# Patient Record
Sex: FEMALE | Race: WHITE | Employment: FULL TIME | URBAN - METROPOLITAN AREA
[De-identification: names, ages, dates, MRNs, and addresses within clinical notes are randomized per-mention and may not be internally consistent; named-entity substitution may affect disease eponyms.]

---

## 2018-06-15 ENCOUNTER — OFFICE VISIT (OUTPATIENT)
Dept: INTERNAL MEDICINE CLINIC | Age: 33
End: 2018-06-15
Payer: COMMERCIAL

## 2018-06-15 ENCOUNTER — TELEPHONE (OUTPATIENT)
Dept: FAMILY MEDICINE CLINIC | Age: 33
End: 2018-06-15

## 2018-06-15 VITALS
HEART RATE: 77 BPM | DIASTOLIC BLOOD PRESSURE: 70 MMHG | RESPIRATION RATE: 16 BRPM | HEIGHT: 65 IN | SYSTOLIC BLOOD PRESSURE: 114 MMHG | WEIGHT: 244 LBS | OXYGEN SATURATION: 98 % | BODY MASS INDEX: 40.65 KG/M2 | TEMPERATURE: 98 F

## 2018-06-15 DIAGNOSIS — F32.A ANXIETY AND DEPRESSION: Primary | ICD-10-CM

## 2018-06-15 DIAGNOSIS — Z02.89 EXAMINATION, PHYSICAL, EMPLOYEE: Primary | ICD-10-CM

## 2018-06-15 DIAGNOSIS — F41.9 ANXIETY AND DEPRESSION: Primary | ICD-10-CM

## 2018-06-15 PROCEDURE — 99385 PREV VISIT NEW AGE 18-39: CPT | Performed by: NURSE PRACTITIONER

## 2018-06-15 RX ORDER — TRAZODONE HYDROCHLORIDE 50 MG/1
50 TABLET ORAL NIGHTLY
COMMUNITY
End: 2018-10-31 | Stop reason: SDUPTHER

## 2018-06-15 RX ORDER — BUSPIRONE HYDROCHLORIDE 15 MG/1
15 TABLET ORAL 2 TIMES DAILY
COMMUNITY
End: 2018-08-22 | Stop reason: DRUGHIGH

## 2018-06-15 RX ORDER — VENLAFAXINE HYDROCHLORIDE 150 MG/1
150 CAPSULE, EXTENDED RELEASE ORAL DAILY
COMMUNITY
End: 2018-11-21 | Stop reason: SDUPTHER

## 2018-06-15 ASSESSMENT — ENCOUNTER SYMPTOMS
EYES NEGATIVE: 1
COUGH: 0
GASTROINTESTINAL NEGATIVE: 1
WHEEZING: 0
SHORTNESS OF BREATH: 0

## 2018-06-15 ASSESSMENT — PATIENT HEALTH QUESTIONNAIRE - PHQ9
1. LITTLE INTEREST OR PLEASURE IN DOING THINGS: 0
2. FEELING DOWN, DEPRESSED OR HOPELESS: 0
SUM OF ALL RESPONSES TO PHQ QUESTIONS 1-9: 0
SUM OF ALL RESPONSES TO PHQ9 QUESTIONS 1 & 2: 0

## 2018-06-29 DIAGNOSIS — Z02.89 EXAMINATION, PHYSICAL, EMPLOYEE: ICD-10-CM

## 2018-06-29 LAB
CHOLESTEROL, TOTAL: 211 MG/DL (ref 0–199)
GLUCOSE BLD-MCNC: 78 MG/DL (ref 74–109)
HDLC SERPL-MCNC: 53 MG/DL (ref 40–59)
LDL CHOLESTEROL CALCULATED: 103 MG/DL (ref 0–129)
TRIGL SERPL-MCNC: 277 MG/DL (ref 0–200)

## 2018-07-06 ENCOUNTER — TELEPHONE (OUTPATIENT)
Dept: INTERNAL MEDICINE CLINIC | Age: 33
End: 2018-07-06

## 2018-08-22 ENCOUNTER — OFFICE VISIT (OUTPATIENT)
Dept: FAMILY MEDICINE CLINIC | Age: 33
End: 2018-08-22
Payer: COMMERCIAL

## 2018-08-22 VITALS
HEART RATE: 80 BPM | HEIGHT: 66 IN | DIASTOLIC BLOOD PRESSURE: 70 MMHG | SYSTOLIC BLOOD PRESSURE: 108 MMHG | BODY MASS INDEX: 40.15 KG/M2 | TEMPERATURE: 97.9 F | RESPIRATION RATE: 20 BRPM | WEIGHT: 249.8 LBS

## 2018-08-22 DIAGNOSIS — F41.1 GAD (GENERALIZED ANXIETY DISORDER): ICD-10-CM

## 2018-08-22 DIAGNOSIS — M13.0 POLYARTHRITIS: ICD-10-CM

## 2018-08-22 DIAGNOSIS — R53.82 CHRONIC FATIGUE: ICD-10-CM

## 2018-08-22 DIAGNOSIS — F33.42 RECURRENT MAJOR DEPRESSIVE DISORDER, IN FULL REMISSION (HCC): ICD-10-CM

## 2018-08-22 DIAGNOSIS — E78.2 MIXED HYPERLIPIDEMIA: ICD-10-CM

## 2018-08-22 DIAGNOSIS — E78.2 MIXED HYPERLIPIDEMIA: Primary | ICD-10-CM

## 2018-08-22 PROBLEM — F32.9 MAJOR DEPRESSION: Status: ACTIVE | Noted: 2018-08-22

## 2018-08-22 PROBLEM — G47.9 SLEEP DISORDER: Status: ACTIVE | Noted: 2018-08-22

## 2018-08-22 LAB
ALBUMIN SERPL-MCNC: 4.1 G/DL (ref 3.9–4.9)
ALP BLD-CCNC: 109 U/L (ref 40–130)
ALT SERPL-CCNC: 45 U/L (ref 0–33)
ANION GAP SERPL CALCULATED.3IONS-SCNC: 14 MEQ/L (ref 7–13)
AST SERPL-CCNC: 30 U/L (ref 0–35)
BASOPHILS ABSOLUTE: 0.1 K/UL (ref 0–0.2)
BASOPHILS RELATIVE PERCENT: 0.7 %
BILIRUB SERPL-MCNC: 0.5 MG/DL (ref 0–1.2)
BUN BLDV-MCNC: 9 MG/DL (ref 6–20)
C-REACTIVE PROTEIN: 12.5 MG/L (ref 0–5)
CALCIUM SERPL-MCNC: 9 MG/DL (ref 8.6–10.2)
CHLORIDE BLD-SCNC: 100 MEQ/L (ref 98–107)
CO2: 26 MEQ/L (ref 22–29)
CREAT SERPL-MCNC: 0.76 MG/DL (ref 0.5–0.9)
EOSINOPHILS ABSOLUTE: 0 K/UL (ref 0–0.7)
EOSINOPHILS RELATIVE PERCENT: 0.5 %
GFR AFRICAN AMERICAN: >60
GFR NON-AFRICAN AMERICAN: >60
GLOBULIN: 2.8 G/DL (ref 2.3–3.5)
GLUCOSE BLD-MCNC: 88 MG/DL (ref 74–109)
HCT VFR BLD CALC: 41.6 % (ref 37–47)
HEMOGLOBIN: 14.3 G/DL (ref 12–16)
LYMPHOCYTES ABSOLUTE: 1.9 K/UL (ref 1–4.8)
LYMPHOCYTES RELATIVE PERCENT: 25.3 %
MCH RBC QN AUTO: 31.4 PG (ref 27–31.3)
MCHC RBC AUTO-ENTMCNC: 34.4 % (ref 33–37)
MCV RBC AUTO: 91.3 FL (ref 82–100)
MONOCYTES ABSOLUTE: 0.7 K/UL (ref 0.2–0.8)
MONOCYTES RELATIVE PERCENT: 9 %
NEUTROPHILS ABSOLUTE: 4.9 K/UL (ref 1.4–6.5)
NEUTROPHILS RELATIVE PERCENT: 64.5 %
PDW BLD-RTO: 13.3 % (ref 11.5–14.5)
PLATELET # BLD: 292 K/UL (ref 130–400)
POTASSIUM SERPL-SCNC: 4.4 MEQ/L (ref 3.5–5.1)
RBC # BLD: 4.56 M/UL (ref 4.2–5.4)
RHEUMATOID FACTOR: <10 IU/ML (ref 0–14)
SEDIMENTATION RATE, ERYTHROCYTE: 8 MM (ref 0–20)
SODIUM BLD-SCNC: 140 MEQ/L (ref 132–144)
TOTAL PROTEIN: 6.9 G/DL (ref 6.4–8.1)
TSH SERPL DL<=0.05 MIU/L-ACNC: 1.99 UIU/ML (ref 0.27–4.2)
WBC # BLD: 7.6 K/UL (ref 4.8–10.8)

## 2018-08-22 PROCEDURE — 99214 OFFICE O/P EST MOD 30 MIN: CPT | Performed by: FAMILY MEDICINE

## 2018-08-22 RX ORDER — VENLAFAXINE HYDROCHLORIDE 75 MG/1
1 CAPSULE, EXTENDED RELEASE ORAL DAILY
Refills: 2 | COMMUNITY
Start: 2018-07-24 | End: 2019-05-14 | Stop reason: DRUGHIGH

## 2018-08-22 RX ORDER — BUSPIRONE HYDROCHLORIDE 15 MG/1
TABLET ORAL
COMMUNITY
End: 2018-11-07 | Stop reason: SDUPTHER

## 2018-08-22 NOTE — PROGRESS NOTES
polydipsia/polyuria, skin changes, temperature intolerance or unexpected weight changes  Respiratory ROS: negative for - cough, hemoptysis, pleuritic pain, shortness of breath or wheezing  Cardiovascular ROS: no chest pain or dyspnea on exertion  Gastrointestinal ROS: no abdominal pain, change in bowel habits, or black or bloody stools  Genito-Urinary ROS: no dysuria, trouble voiding, or hematuria  Musculoskeletal ROS: positive for - joint pain, joint stiffness and pain in lower back    Neurological ROS: negative for - dizziness, gait disturbance, headaches, impaired coordination/balance, numbness/tingling, tremors or visual changes  Dermatological ROS: negative for - dry skin, lumps, pruritus or rash        Objective:   Blood pressure 108/70, pulse 80, temperature 97.9 °F (36.6 °C), temperature source Temporal, resp. rate 20, height 5' 5.75\" (1.67 m), weight 249 lb 12.8 oz (113.3 kg), last menstrual period 08/08/2018. Physical Examination: General appearance - alert, well appearing, and in no distress  Mental status - alert, oriented to person, place, and time  Eyes - pupils equal and reactive, extraocular eye movements intact  Ears - bilateral TM's and external ear canals normal  Mouth - mucous membranes moist, pharynx normal without lesions  Neck - supple, no significant adenopathy  Lymphatics - no palpable lymphadenopathy, no hepatosplenomegaly  Chest - clear to auscultation, no wheezes, rales or rhonchi, symmetric air entry  Heart - normal rate, regular rhythm, normal S1, S2, no murmurs, rubs, clicks or gallops  Abdomen - soft, nontender, nondistended, no masses or organomegaly  Neurological - alert, oriented, normal speech, no focal findings or movement disorder noted  Musculoskeletal - no joint tenderness, deformity or swelling  Extremities: peripheral pulses normal, no pedal edema, no clubbing or cyanosis.     Orders Only on 06/29/2018   Component Date Value Ref Range Status    Cholesterol, Total 06/29/2018 211* 0 - 199 mg/dL Final    ATP III Cholesterol Classification is Borderline High.  Triglycerides 06/29/2018 277* 0 - 200 mg/dL Final    ATP III Triglycerides Classification is High.  HDL 06/29/2018 53  40 - 59 mg/dL Final    Comment: ATP III HDL Cholesterol Classification is Desirable. Expected Values:    Males:    >55 = No Risk            35-55 = Moderate Risk            <35 = High Risk    Females:  >65 = No Risk            45-65 = Moderate Risk            <45 = High Risk    NCEP Guidelines: Third Report May 2001  >59 = negative risk factor for CHD  <40 = major risk factor for CHD      LDL Calculated 06/29/2018 103  0 - 129 mg/dL Final    ATP III LDL Classification is Near Optimal.    Glucose 06/29/2018 78  74 - 109 mg/dL Final        Assessment / Plan:     Encounter Diagnoses   Name Primary?     Mixed hyperlipidemia Yes    Polyarthritis     Chronic fatigue     Recurrent major depressive disorder, in full remission ([facility] [address]     JOHANNA (generalized anxiety disorder)        Orders Placed This Encounter   Procedures    Comprehensive Metabolic Panel     Standing Status:   Future     Number of Occurrences:   1     Standing Expiration Date:   8/22/2019    CBC Auto Differential     Standing Status:   Future     Number of Occurrences:   1     Standing Expiration Date:   8/22/2019    Rheumatoid Factor     Standing Status:   Future     Number of Occurrences:   1     Standing Expiration Date:   8/22/2019    C-Reactive Protein     Standing Status:   Future     Number of Occurrences:   1     Standing Expiration Date:   8/22/2019    Sedimentation Rate     Standing Status:   Future     Number of Occurrences:   1     Standing Expiration Date:   8/22/2019    Anti-DNA Antibody, Double-Stranded     Standing Status:   Future     Number of Occurrences:   1     Standing Expiration Date:   9/00/7603    Cyclic Citrul Peptide Antibody, IgG     Standing Status:   Future     Number of Occurrences:   1     Standing Expiration Date:   8/22/2019    Anti-Scleroderma Antibody     Standing Status:   Future     Number of Occurrences:   1     Standing Expiration Date:   8/22/2019    ANTI-RNP (BRIGID AB)     Standing Status:   Future     Number of Occurrences:   1     Standing Expiration Date:   8/22/2019    TSH Without Reflex     Standing Status:   Future     Number of Occurrences:   1     Standing Expiration Date:   8/22/2019   Esequiel Goltz Smith (Brigid) Antibody IgG     Standing Status:   Future     Number of Occurrences:   1     Standing Expiration Date:   8/22/2019    Referral To Unknown External Psychiatry     Referral Priority:   Routine     Referral Type:   Eval and Treat     Referral Reason:   Specialty Services Required     Referred to Provider:   Asif Paniagua MD     Requested Specialty:   Psychiatry     Number of Visits Requested:   1       Outpatient Encounter Prescriptions as of 8/22/2018   Medication Sig Dispense Refill    lurasidone (LATUDA) 60 MG TABS tablet Take 60 mg by mouth daily      busPIRone (BUSPAR) 15 MG tablet Take 15mg qam and 30mg qpm      venlafaxine (EFFEXOR XR) 75 MG extended release capsule Take 1 capsule by mouth daily  2    traZODone (DESYREL) 50 MG tablet Take 50 mg by mouth nightly      venlafaxine (EFFEXOR XR) 150 MG extended release capsule Take 150 mg by mouth daily       Levonorgestrel (MIRENA, 52 MG, IU) by Intrauterine route      [DISCONTINUED] lurasidone (LATUDA) 40 MG TABS tablet Take 1 tablet by mouth daily 30 tablet 3    [DISCONTINUED] busPIRone (BUSPAR) 15 MG tablet Take 15 mg by mouth 2 times daily       No facility-administered encounter medications on file as of 8/22/2018. The nature of cardiac risk has been fully discussed with this patient. I have made her aware of her LDL target goal given her cardiovascular risk analysis. I have discussed the appropriate diet. The need for lifelong compliance in order to reduce risk is stressed.  A regular exercise program is

## 2018-08-25 LAB
CCP IGG ANTIBODIES: 7 UNITS (ref 0–19)
DOUBLE STRANDED DNA AB, IGG: NORMAL
ENA TO RNP ANTIBODY: 0 AU/ML (ref 0–40)
ENA TO SMITH (SM) ANTIBODY: 0 AU/ML (ref 0–40)
SCLERODERMA (SCL-70) AB: 0 AU/ML (ref 0–40)

## 2018-10-31 RX ORDER — TRAZODONE HYDROCHLORIDE 50 MG/1
50 TABLET ORAL NIGHTLY
Qty: 90 TABLET | Refills: 3 | Status: SHIPPED | OUTPATIENT
Start: 2018-10-31 | End: 2019-08-14 | Stop reason: SDUPTHER

## 2018-11-07 RX ORDER — BUSPIRONE HYDROCHLORIDE 15 MG/1
TABLET ORAL
Qty: 270 TABLET | Refills: 3 | Status: SHIPPED | OUTPATIENT
Start: 2018-11-07 | End: 2018-11-07 | Stop reason: SDUPTHER

## 2018-11-07 RX ORDER — BUSPIRONE HYDROCHLORIDE 15 MG/1
TABLET ORAL
Qty: 270 TABLET | Refills: 3 | Status: SHIPPED | OUTPATIENT
Start: 2018-11-07 | End: 2019-05-14

## 2018-11-21 ENCOUNTER — TELEPHONE (OUTPATIENT)
Dept: INTERNAL MEDICINE CLINIC | Age: 33
End: 2018-11-21

## 2018-11-21 RX ORDER — VENLAFAXINE HYDROCHLORIDE 150 MG/1
150 CAPSULE, EXTENDED RELEASE ORAL DAILY
Qty: 30 CAPSULE | Refills: 0 | Status: SHIPPED | OUTPATIENT
Start: 2018-11-21 | End: 2018-11-21 | Stop reason: SDUPTHER

## 2018-11-21 RX ORDER — VENLAFAXINE HYDROCHLORIDE 150 MG/1
150 CAPSULE, EXTENDED RELEASE ORAL DAILY
Qty: 30 CAPSULE | Refills: 5 | Status: SHIPPED | OUTPATIENT
Start: 2018-11-21 | End: 2019-08-14 | Stop reason: SDUPTHER

## 2019-01-29 ENCOUNTER — TELEPHONE (OUTPATIENT)
Dept: INTERNAL MEDICINE CLINIC | Age: 34
End: 2019-01-29

## 2019-01-29 RX ORDER — CYCLOBENZAPRINE HCL 10 MG
10 TABLET ORAL 3 TIMES DAILY PRN
Qty: 30 TABLET | Refills: 0 | Status: SHIPPED | OUTPATIENT
Start: 2019-01-29 | End: 2022-02-22 | Stop reason: SDUPTHER

## 2019-02-11 ENCOUNTER — OFFICE VISIT (OUTPATIENT)
Dept: INTERNAL MEDICINE CLINIC | Age: 34
End: 2019-02-11
Payer: COMMERCIAL

## 2019-02-11 VITALS
SYSTOLIC BLOOD PRESSURE: 122 MMHG | BODY MASS INDEX: 45.48 KG/M2 | TEMPERATURE: 98.5 F | HEIGHT: 65 IN | WEIGHT: 273 LBS | OXYGEN SATURATION: 98 % | RESPIRATION RATE: 14 BRPM | DIASTOLIC BLOOD PRESSURE: 82 MMHG | HEART RATE: 95 BPM

## 2019-02-11 DIAGNOSIS — E55.9 HYPOVITAMINOSIS D: ICD-10-CM

## 2019-02-11 DIAGNOSIS — R53.83 FATIGUE, UNSPECIFIED TYPE: ICD-10-CM

## 2019-02-11 DIAGNOSIS — E78.2 MIXED HYPERLIPIDEMIA: ICD-10-CM

## 2019-02-11 DIAGNOSIS — R63.5 UNEXPLAINED WEIGHT GAIN: ICD-10-CM

## 2019-02-11 DIAGNOSIS — G47.19 EXCESSIVE DAYTIME SLEEPINESS: Primary | ICD-10-CM

## 2019-02-11 DIAGNOSIS — R06.83 SNORING: ICD-10-CM

## 2019-02-11 LAB
ALBUMIN SERPL-MCNC: 4.1 G/DL (ref 3.5–4.6)
ALP BLD-CCNC: 121 U/L (ref 40–130)
ALT SERPL-CCNC: 55 U/L (ref 0–33)
ANION GAP SERPL CALCULATED.3IONS-SCNC: 14 MEQ/L (ref 9–15)
AST SERPL-CCNC: 38 U/L (ref 0–35)
BASOPHILS ABSOLUTE: 0.1 K/UL (ref 0–0.2)
BASOPHILS RELATIVE PERCENT: 1.2 %
BILIRUB SERPL-MCNC: 0.4 MG/DL (ref 0.2–0.7)
BUN BLDV-MCNC: 8 MG/DL (ref 6–20)
CALCIUM SERPL-MCNC: 9.3 MG/DL (ref 8.5–9.9)
CHLORIDE BLD-SCNC: 99 MEQ/L (ref 95–107)
CHOLESTEROL, TOTAL: 219 MG/DL (ref 0–199)
CO2: 25 MEQ/L (ref 20–31)
CREAT SERPL-MCNC: 0.8 MG/DL (ref 0.5–0.9)
EOSINOPHILS ABSOLUTE: 0.1 K/UL (ref 0–0.7)
EOSINOPHILS RELATIVE PERCENT: 0.9 %
GFR AFRICAN AMERICAN: >60
GFR NON-AFRICAN AMERICAN: >60
GLOBULIN: 3.5 G/DL (ref 2.3–3.5)
GLUCOSE BLD-MCNC: 86 MG/DL (ref 70–99)
HCT VFR BLD CALC: 42.5 % (ref 37–47)
HDLC SERPL-MCNC: 54 MG/DL (ref 40–59)
HEMOGLOBIN: 14.4 G/DL (ref 12–16)
LDL CHOLESTEROL CALCULATED: ABNORMAL MG/DL (ref 0–129)
LYMPHOCYTES ABSOLUTE: 1.9 K/UL (ref 1–4.8)
LYMPHOCYTES RELATIVE PERCENT: 19.4 %
MCH RBC QN AUTO: 31.1 PG (ref 27–31.3)
MCHC RBC AUTO-ENTMCNC: 33.9 % (ref 33–37)
MCV RBC AUTO: 91.6 FL (ref 82–100)
MONOCYTES ABSOLUTE: 0.9 K/UL (ref 0.2–0.8)
MONOCYTES RELATIVE PERCENT: 9.3 %
NEUTROPHILS ABSOLUTE: 6.9 K/UL (ref 1.4–6.5)
NEUTROPHILS RELATIVE PERCENT: 69.2 %
PDW BLD-RTO: 13.1 % (ref 11.5–14.5)
PLATELET # BLD: 317 K/UL (ref 130–400)
POTASSIUM SERPL-SCNC: 3.8 MEQ/L (ref 3.4–4.9)
RBC # BLD: 4.63 M/UL (ref 4.2–5.4)
SODIUM BLD-SCNC: 138 MEQ/L (ref 135–144)
T4 FREE: 0.73 NG/DL (ref 0.84–1.68)
TOTAL PROTEIN: 7.6 G/DL (ref 6.3–8)
TRIGL SERPL-MCNC: 426 MG/DL (ref 0–150)
TSH SERPL DL<=0.05 MIU/L-ACNC: 3.63 UIU/ML (ref 0.44–3.86)
VITAMIN D 25-HYDROXY: 19.1 NG/ML (ref 30–100)
WBC # BLD: 10 K/UL (ref 4.8–10.8)

## 2019-02-11 PROCEDURE — 99214 OFFICE O/P EST MOD 30 MIN: CPT | Performed by: NURSE PRACTITIONER

## 2019-02-11 RX ORDER — LAMOTRIGINE 25 MG/1
25 TABLET ORAL DAILY
COMMUNITY
End: 2019-05-14

## 2019-02-11 ASSESSMENT — ENCOUNTER SYMPTOMS
CONSTIPATION: 0
SORE THROAT: 0
ABDOMINAL PAIN: 0
SHORTNESS OF BREATH: 0
SWOLLEN GLANDS: 0
CHANGE IN BOWEL HABIT: 0
DIARRHEA: 0
VOMITING: 0
VISUAL CHANGE: 0
WHEEZING: 0
COUGH: 0
NAUSEA: 0
RHINORRHEA: 0
EYES NEGATIVE: 1

## 2019-02-12 RX ORDER — ERGOCALCIFEROL 1.25 MG/1
50000 CAPSULE ORAL WEEKLY
Qty: 12 CAPSULE | Refills: 1 | Status: SHIPPED | OUTPATIENT
Start: 2019-02-12 | End: 2022-02-22 | Stop reason: ALTCHOICE

## 2019-02-28 ENCOUNTER — HOSPITAL ENCOUNTER (OUTPATIENT)
Dept: SLEEP CENTER | Age: 34
Discharge: HOME | End: 2019-03-02
Payer: COMMERCIAL

## 2019-02-28 PROCEDURE — G0399 HOME SLEEP TEST/TYPE 3 PORTA: HCPCS

## 2019-02-28 PROCEDURE — 95806 SLEEP STUDY UNATT&RESP EFFT: CPT | Performed by: INTERNAL MEDICINE

## 2019-03-12 DIAGNOSIS — G47.19 EXCESSIVE DAYTIME SLEEPINESS: ICD-10-CM

## 2019-03-12 DIAGNOSIS — R06.83 SNORING: ICD-10-CM

## 2019-03-12 DIAGNOSIS — R53.83 FATIGUE, UNSPECIFIED TYPE: ICD-10-CM

## 2019-03-12 PROBLEM — G47.30 SLEEP APNEA: Status: ACTIVE | Noted: 2018-08-22

## 2019-04-05 RX ORDER — ATOMOXETINE 40 MG/1
40 CAPSULE ORAL DAILY
Qty: 30 CAPSULE | Refills: 3 | Status: SHIPPED | OUTPATIENT
Start: 2019-04-05 | End: 2019-05-14 | Stop reason: SDUPTHER

## 2019-04-05 RX ORDER — QUETIAPINE FUMARATE 50 MG/1
100 TABLET, FILM COATED ORAL NIGHTLY
Qty: 60 TABLET | Refills: 3 | Status: SHIPPED | OUTPATIENT
Start: 2019-04-05 | End: 2019-05-14 | Stop reason: ALTCHOICE

## 2019-05-14 ENCOUNTER — OFFICE VISIT (OUTPATIENT)
Dept: FAMILY MEDICINE CLINIC | Age: 34
End: 2019-05-14
Payer: COMMERCIAL

## 2019-05-14 VITALS
HEART RATE: 98 BPM | WEIGHT: 279 LBS | BODY MASS INDEX: 46.48 KG/M2 | DIASTOLIC BLOOD PRESSURE: 76 MMHG | RESPIRATION RATE: 16 BRPM | TEMPERATURE: 98.5 F | OXYGEN SATURATION: 98 % | HEIGHT: 65 IN | SYSTOLIC BLOOD PRESSURE: 112 MMHG

## 2019-05-14 DIAGNOSIS — F90.0 ATTENTION DEFICIT HYPERACTIVITY DISORDER (ADHD), PREDOMINANTLY INATTENTIVE TYPE: Primary | ICD-10-CM

## 2019-05-14 PROCEDURE — 99213 OFFICE O/P EST LOW 20 MIN: CPT | Performed by: FAMILY MEDICINE

## 2019-05-14 RX ORDER — ARIPIPRAZOLE 15 MG/1
TABLET ORAL
Refills: 1 | COMMUNITY
Start: 2019-04-30 | End: 2019-08-14 | Stop reason: SDUPTHER

## 2019-05-14 RX ORDER — BUSPIRONE HYDROCHLORIDE 30 MG/1
TABLET ORAL
Refills: 1 | COMMUNITY
Start: 2019-04-11 | End: 2019-08-14 | Stop reason: SDUPTHER

## 2019-05-14 RX ORDER — ATOMOXETINE 80 MG/1
80 CAPSULE ORAL DAILY
Qty: 90 CAPSULE | Refills: 3 | Status: SHIPPED | OUTPATIENT
Start: 2019-05-14 | End: 2019-06-18 | Stop reason: ALTCHOICE

## 2019-05-14 RX ORDER — LAMOTRIGINE 100 MG/1
TABLET ORAL
Refills: 1 | COMMUNITY
Start: 2019-05-03 | End: 2019-08-14 | Stop reason: SDUPTHER

## 2019-05-14 ASSESSMENT — ENCOUNTER SYMPTOMS
RHINORRHEA: 0
COUGH: 0
GASTROINTESTINAL NEGATIVE: 1
CHEST TIGHTNESS: 0
RESPIRATORY NEGATIVE: 1
EYES NEGATIVE: 1

## 2019-05-14 NOTE — PROGRESS NOTES
Patient is seen in follow up for   Chief Complaint   Patient presents with   Cheyanne Naqvi Doctor     Patient here to establish care, Former  Charley BARRETO     Patient here complaining of have more trouble focusing mainly while doing tasks at work and home. Losing focus easily. Currently on Strattera, she doesn't feel like it helps her. HPIhere for follow up on add not doing well.     Past Medical History:   Diagnosis Date    Abnormal Pap smear of cervix     JOHANNA (generalized anxiety disorder)     Hyperlipidemia     Major depression     Sleep disorder      Patient Active Problem List    Diagnosis Date Noted    Major depression 08/22/2018    JOHANNA (generalized anxiety disorder) 08/22/2018    Sleep apnea 08/22/2018    Hyperlipidemia      Past Surgical History:   Procedure Laterality Date    LEEP       Family History   Problem Relation Age of Onset    Depression Mother     Lupus Mother     Heart Attack Mother      Social History     Socioeconomic History    Marital status:      Spouse name: None    Number of children: None    Years of education: None    Highest education level: None   Occupational History    None   Social Needs    Financial resource strain: None    Food insecurity:     Worry: None     Inability: None    Transportation needs:     Medical: None     Non-medical: None   Tobacco Use    Smoking status: Never Smoker    Smokeless tobacco: Never Used   Substance and Sexual Activity    Alcohol use: No    Drug use: No    Sexual activity: Yes     Partners: Male   Lifestyle    Physical activity:     Days per week: None     Minutes per session: None    Stress: None   Relationships    Social connections:     Talks on phone: None     Gets together: None     Attends [employer] service: None     Active member of club or organization: None     Attends meetings of clubs or organizations: None     Relationship status: None    Intimate partner violence:     Fear of current or ex partner: None     Emotionally abused: None     Physically abused: None     Forced sexual activity: None   Other Topics Concern    None   Social History Narrative    None     Current Outpatient Medications   Medication Sig Dispense Refill    ARIPiprazole (ABILIFY) 15 MG tablet Take 1 tablet by mouth at bedtime  1    busPIRone (BUSPAR) 30 MG tablet Take 1 tablet by mouth twice a day  1    lamoTRIgine (LAMICTAL) 100 MG tablet Take 1 tablet by mouth at bedtime  1    atomoxetine (STRATTERA) 80 MG capsule Take 1 capsule by mouth daily 90 capsule 3    vitamin D (ERGOCALCIFEROL) 04974 units CAPS capsule Take 1 capsule by mouth once a week 12 capsule 1    venlafaxine (EFFEXOR XR) 150 MG extended release capsule Take 1 capsule by mouth daily 30 capsule 5    traZODone (DESYREL) 50 MG tablet Take 1 tablet by mouth nightly 90 tablet 3    Levonorgestrel (MIRENA, 52 MG, IU) by Intrauterine route       No current facility-administered medications for this visit. Current Outpatient Medications on File Prior to Visit   Medication Sig Dispense Refill    ARIPiprazole (ABILIFY) 15 MG tablet Take 1 tablet by mouth at bedtime  1    busPIRone (BUSPAR) 30 MG tablet Take 1 tablet by mouth twice a day  1    lamoTRIgine (LAMICTAL) 100 MG tablet Take 1 tablet by mouth at bedtime  1    vitamin D (ERGOCALCIFEROL) 08794 units CAPS capsule Take 1 capsule by mouth once a week 12 capsule 1    venlafaxine (EFFEXOR XR) 150 MG extended release capsule Take 1 capsule by mouth daily 30 capsule 5    traZODone (DESYREL) 50 MG tablet Take 1 tablet by mouth nightly 90 tablet 3    Levonorgestrel (MIRENA, 52 MG, IU) by Intrauterine route       No current facility-administered medications on file prior to visit.       No Known Allergies  Health Maintenance   Topic Date Due    Varicella Vaccine (1 of 2 - 13+ 2-dose series) 07/24/1999    DTaP/Tdap/Td vaccine (1 - Tdap) 06/15/2019 (Originally 7/24/2005)    HIV screen  06/15/2019 (Originally 7/24/2001)    Cervical cancer screen  08/22/2023    Flu vaccine  Completed    Pneumococcal 0-64 years Vaccine  Aged Out       Review of Systems     Review of Systems   Constitutional: Negative for activity change, appetite change, fatigue and fever. HENT: Negative for congestion and rhinorrhea. Eyes: Negative. Respiratory: Negative. Negative for cough and chest tightness. Cardiovascular: Negative. Gastrointestinal: Negative. Endocrine: Negative. Genitourinary: Negative. Musculoskeletal: Negative. Skin: Negative. Neurological: Negative for dizziness, light-headedness and numbness. Hematological: Negative. Psychiatric/Behavioral: Negative. Physical Exam  Vitals:    05/14/19 1810   BP: 112/76   Site: Left Upper Arm   Position: Sitting   Cuff Size: Large Adult   Pulse: 98   Resp: 16   Temp: 98.5 °F (36.9 °C)   TempSrc: Oral   SpO2: 98%   Weight: 279 lb (126.6 kg)   Height: 5' 5\" (1.651 m)       Physical Exam   Constitutional: She is oriented to person, place, and time. She appears well-developed and well-nourished. HENT:   Right Ear: External ear normal.   Left Ear: External ear normal.   Mouth/Throat: Oropharynx is clear and moist.   Eyes: Pupils are equal, round, and reactive to light. EOM are normal.   Neck: Normal range of motion. Neck supple. No thyromegaly present. Cardiovascular: Normal rate, regular rhythm and normal heart sounds. Exam reveals no gallop and no friction rub. No murmur heard. Pulmonary/Chest: Effort normal. No respiratory distress. She has no wheezes. She has no rales. She exhibits no tenderness. Abdominal: Soft. Bowel sounds are normal. She exhibits no distension and no mass. There is no tenderness. There is no rebound and no guarding. Musculoskeletal: Normal range of motion. Lymphadenopathy:     She has no cervical adenopathy. Neurological: She is alert and oriented to person, place, and time. No cranial nerve deficit. Coordination normal.   Skin: Skin is warm and dry. Psychiatric: She has a normal mood and affect. Assessment   Diagnosis Orders   1. Attention deficit hyperactivity disorder (ADHD), predominantly inattentive type       Problem List     None          Plan  No orders of the defined types were placed in this encounter. Orders Placed This Encounter   Medications    atomoxetine (STRATTERA) 80 MG capsule     Sig: Take 1 capsule by mouth daily     Dispense:  90 capsule     Refill:  3     No follow-ups on file.   Rosendo Darden MD

## 2019-06-13 ENCOUNTER — TELEPHONE (OUTPATIENT)
Dept: FAMILY MEDICINE CLINIC | Age: 34
End: 2019-06-13

## 2019-06-13 NOTE — TELEPHONE ENCOUNTER
Pt called stating she has been trying the increased dosage of Strattera 80mg x30days and it has been ineffective with side effects of fatigue. She is requesting alternative. Please advise.

## 2019-06-18 ENCOUNTER — OFFICE VISIT (OUTPATIENT)
Dept: FAMILY MEDICINE CLINIC | Age: 34
End: 2019-06-18
Payer: COMMERCIAL

## 2019-06-18 VITALS
WEIGHT: 284.6 LBS | RESPIRATION RATE: 15 BRPM | HEIGHT: 65 IN | TEMPERATURE: 99 F | HEART RATE: 83 BPM | SYSTOLIC BLOOD PRESSURE: 124 MMHG | BODY MASS INDEX: 47.42 KG/M2 | DIASTOLIC BLOOD PRESSURE: 82 MMHG | OXYGEN SATURATION: 96 %

## 2019-06-18 DIAGNOSIS — F90.0 ATTENTION DEFICIT HYPERACTIVITY DISORDER (ADHD), PREDOMINANTLY INATTENTIVE TYPE: Primary | ICD-10-CM

## 2019-06-18 PROCEDURE — G8427 DOCREV CUR MEDS BY ELIG CLIN: HCPCS | Performed by: FAMILY MEDICINE

## 2019-06-18 PROCEDURE — 1036F TOBACCO NON-USER: CPT | Performed by: FAMILY MEDICINE

## 2019-06-18 PROCEDURE — 99213 OFFICE O/P EST LOW 20 MIN: CPT | Performed by: FAMILY MEDICINE

## 2019-06-18 PROCEDURE — G8417 CALC BMI ABV UP PARAM F/U: HCPCS | Performed by: FAMILY MEDICINE

## 2019-06-18 RX ORDER — DEXTROAMPHETAMINE SACCHARATE, AMPHETAMINE ASPARTATE MONOHYDRATE, DEXTROAMPHETAMINE SULFATE AND AMPHETAMINE SULFATE 2.5; 2.5; 2.5; 2.5 MG/1; MG/1; MG/1; MG/1
10 CAPSULE, EXTENDED RELEASE ORAL EVERY MORNING
Qty: 30 CAPSULE | Refills: 0 | Status: SHIPPED | OUTPATIENT
Start: 2019-06-18 | End: 2019-07-10 | Stop reason: SDUPTHER

## 2019-06-18 ASSESSMENT — ENCOUNTER SYMPTOMS
RHINORRHEA: 0
EYES NEGATIVE: 1
GASTROINTESTINAL NEGATIVE: 1
CHEST TIGHTNESS: 0
COUGH: 0
RESPIRATORY NEGATIVE: 1

## 2019-06-18 NOTE — PROGRESS NOTES
Patient is seen in follow up for   Chief Complaint   Patient presents with    Follow-up     Patient presents today for a follow up on medication. Pateint states that the medication is causing issues such as sleepy sense the increase and states showing no signs of relife. HPIhere for follow up on add not getting better with strattera.     Past Medical History:   Diagnosis Date    Abnormal Pap smear of cervix     JOHANNA (generalized anxiety disorder)     Hyperlipidemia     Major depression     Sleep disorder      Patient Active Problem List    Diagnosis Date Noted    Major depression 08/22/2018    JOHANNA (generalized anxiety disorder) 08/22/2018    Sleep apnea 08/22/2018    Hyperlipidemia      Past Surgical History:   Procedure Laterality Date    LEEP       Family History   Problem Relation Age of Onset    Depression Mother     Lupus Mother     Heart Attack Mother      Social History     Socioeconomic History    Marital status:      Spouse name: None    Number of children: None    Years of education: None    Highest education level: None   Occupational History    None   Social Needs    Financial resource strain: None    Food insecurity:     Worry: None     Inability: None    Transportation needs:     Medical: None     Non-medical: None   Tobacco Use    Smoking status: Never Smoker    Smokeless tobacco: Never Used   Substance and Sexual Activity    Alcohol use: No    Drug use: No    Sexual activity: Yes     Partners: Male   Lifestyle    Physical activity:     Days per week: None     Minutes per session: None    Stress: None   Relationships    Social connections:     Talks on phone: None     Gets together: None     Attends [employer] service: None     Active member of club or organization: None     Attends meetings of clubs or organizations: None     Relationship status: None    Intimate partner violence:     Fear of current or ex partner: None     Emotionally abused: None and dry. Psychiatric: She has a normal mood and affect. Assessment   Diagnosis Orders   1. Attention deficit hyperactivity disorder (ADHD), predominantly inattentive type  amphetamine-dextroamphetamine (ADDERALL XR) 10 MG extended release capsule     Problem List     None          Plan  No orders of the defined types were placed in this encounter. Orders Placed This Encounter   Medications    amphetamine-dextroamphetamine (ADDERALL XR) 10 MG extended release capsule     Sig: Take 1 capsule by mouth every morning for 30 days.      Dispense:  30 capsule     Refill:  0     Follow up in 4 weeks  Alyssa Gusman MD

## 2019-06-18 NOTE — LETTER
MEDICATION AGREEMENT     Khoi Lemon  [birth year]      For certain conditions, multiple classes of medications may be used to help better manage your symptoms, and to improve your ability to function at home, work and in social settings. However, these medications do have risks, which will be discussed with you, including addiction and dependency. The following prescribed medications need frequent monitoring and will require you to partner and assist in your healthcare. Medication  Dose, instructions and quantity as indicated on current prescription bottle Diagnosis/Reason(s) for Taking Category   amphetamine-dextroamphetamine (ADDERALL XR) 10 MG        ADHD                            Benefits and goals of Controlled Substance Medications: There are two potential goals for your treatment: (1) decreased pain and suffering (2) improved daily life functions. There are many possible treatments for your chronic condition(s), and, in addition to controlled substance medications, we will try alternatives such as physical therapy, yoga, massage, home daily exercise, meditation, relaxation techniques, injections, chiropractic manipulations, surgery, cognitive therapy, hypnosis and many medications that are not habit-forming. Use of controlled substance medications may be helpful, but they are unlikely to resolve all of your symptoms or restore all function. Risks of Controlled Substance Medications:    Opioid pain medications: These medications can lead to problems such as addiction/dependence, sedation, lightheadedness/dizziness, memory issues, falls, constipation, nausea, or vomiting. They may also impair the ability to drive or operate machinery. Additionally, these medications may lower testosterone levels, leading to loss of bone strength, stamina and sex drive.   They may cause problems with breathing, sleep apnea and reduced coughing, which are especially dangerous for patients with lung disease. Overdose or dangerous interactions with alcohol and other medications may occur, leading to death. Hyperalgesia may develop, in which patients receiving opioids for the treatment of pain may actually become more sensitive to certain painful stimuli, and in some cases, experience pain from ordinarily non-painful stimuli. Women between the ages of 14-53 who could become pregnant should carefully weigh the risks and benefits of opioids with their physicians, as these medications increase the risk of pregnancy complications, including miscarriage,  delivery and stillbirth. It is also possible for babies to be born addicted to opioids. Opioid dependence withdrawal symptoms may include; feelings of uneasiness, increased pain, irritability, belly pain, diarrhea, sweats and goose-flesh. Benzodiazepines and non-benzodiazepine sleep medications: These medications can lead to problems such as addiction/dependence, sedation, fatigue, lightheadedness, dizziness, incoordination, falls, depression, hallucinations, and impaired judgment, memory and concentration. The ability to drive and operate machinery may also be affected. Abnormal sleep-related behaviors have been reported, including sleep walking, driving, making telephone calls, eating, or having sex while not fully awake. These medications can suppress breathing and worsen sleep apnea, particularly when combined with alcohol or other sedating medications, potentially leading to death. Dependence withdrawal symptoms may include tremors, anxiety, hallucinations and seizures. Stimulants:  Common adverse effects include addiction/dependence, increased blood pressure and heart rate, decreased appetite, nausea, involuntary weight loss, insomnia, irritability, and headaches.   These risks may increase when these medications are combined with other stimulants, such as caffeine pills or energy drinks, certain weight loss · I will actively participate in any program designed to improve function, including social, physical, psychological and daily or work activities. 2. I will not use illegal or street drugs or another person's prescription. If I have an addiction problem with drugs or alcohol and my provider asks me to enter a program to address this issue, I agree to follow through. Such programs may include:  · 12-Step program and securing a sponsor  · Individual counseling   · Inpatient or outpatient treatment  · Other:_____________________________________________________________________________________________________________________________________________    If in treatment, I will request that a copy of the programs initial evaluation and treatment recommendations be sent to this provider and will not expect refills until that is received. I will also request written monthly updates be sent to this provider to verify my continuing treatment. 3. I will consent to drug screening upon my providers request to assure I am only taking the prescribed drugs, described in this MEDICATION AGREEMENT. I understand that a drug screen is a laboratory test in which a sample of my urine, blood or saliva is checked to see what drugs I have been taking. 4. I agree that I will treat the providers and staff at this office with respect at all times. I will keep all of my scheduled appointments, but if I need to cancel my appointment, I will do so a minimum of 24 hours before it is scheduled. 5. I understand that this provider may stop prescribing the medications listed if:  · I do not show any improvement in pain, or my activity has not improved. · I develop rapid tolerance or loss of improvement, as described in my treatment plan. · I develop significant side effects from the medication.   · My behavior is inconsistent with the responsibilities outlined above,

## 2019-07-10 ENCOUNTER — OFFICE VISIT (OUTPATIENT)
Dept: FAMILY MEDICINE CLINIC | Age: 34
End: 2019-07-10
Payer: COMMERCIAL

## 2019-07-10 VITALS
SYSTOLIC BLOOD PRESSURE: 110 MMHG | HEIGHT: 65 IN | RESPIRATION RATE: 14 BRPM | TEMPERATURE: 98.3 F | DIASTOLIC BLOOD PRESSURE: 78 MMHG | OXYGEN SATURATION: 98 % | HEART RATE: 91 BPM | WEIGHT: 284 LBS | BODY MASS INDEX: 47.32 KG/M2

## 2019-07-10 DIAGNOSIS — F90.0 ATTENTION DEFICIT HYPERACTIVITY DISORDER (ADHD), PREDOMINANTLY INATTENTIVE TYPE: ICD-10-CM

## 2019-07-10 PROCEDURE — 99213 OFFICE O/P EST LOW 20 MIN: CPT | Performed by: NURSE PRACTITIONER

## 2019-07-10 PROCEDURE — G8417 CALC BMI ABV UP PARAM F/U: HCPCS | Performed by: NURSE PRACTITIONER

## 2019-07-10 PROCEDURE — 1036F TOBACCO NON-USER: CPT | Performed by: NURSE PRACTITIONER

## 2019-07-10 PROCEDURE — G8427 DOCREV CUR MEDS BY ELIG CLIN: HCPCS | Performed by: NURSE PRACTITIONER

## 2019-07-10 RX ORDER — DEXTROAMPHETAMINE SACCHARATE, AMPHETAMINE ASPARTATE MONOHYDRATE, DEXTROAMPHETAMINE SULFATE AND AMPHETAMINE SULFATE 5; 5; 5; 5 MG/1; MG/1; MG/1; MG/1
20 CAPSULE, EXTENDED RELEASE ORAL EVERY MORNING
Qty: 30 CAPSULE | Refills: 0 | Status: SHIPPED | OUTPATIENT
Start: 2019-07-10 | End: 2019-08-09 | Stop reason: SDUPTHER

## 2019-07-10 ASSESSMENT — ENCOUNTER SYMPTOMS
RHINORRHEA: 0
CHEST TIGHTNESS: 0
GASTROINTESTINAL NEGATIVE: 1
EYES NEGATIVE: 1
COUGH: 0
RESPIRATORY NEGATIVE: 1

## 2019-07-10 NOTE — PROGRESS NOTES
as well as treatment plan/ rationale and result expectations havebeen discussed with the patient who expresses understanding and desires to proceed. Pt instructions reviewed and given to patient.     Close follow up to evaluate treatment resultsand for coordination of care. I have reviewed the patient's medical historyin detail and updated the computerized patient record.     Kahlil Allen, TAYLOR - CNP

## 2019-08-09 DIAGNOSIS — F90.0 ATTENTION DEFICIT HYPERACTIVITY DISORDER (ADHD), PREDOMINANTLY INATTENTIVE TYPE: ICD-10-CM

## 2019-08-12 RX ORDER — DEXTROAMPHETAMINE SACCHARATE, AMPHETAMINE ASPARTATE MONOHYDRATE, DEXTROAMPHETAMINE SULFATE AND AMPHETAMINE SULFATE 5; 5; 5; 5 MG/1; MG/1; MG/1; MG/1
20 CAPSULE, EXTENDED RELEASE ORAL EVERY MORNING
Qty: 30 CAPSULE | Refills: 0 | Status: SHIPPED | OUTPATIENT
Start: 2019-08-12 | End: 2019-08-14 | Stop reason: SDUPTHER

## 2019-08-14 DIAGNOSIS — F90.0 ATTENTION DEFICIT HYPERACTIVITY DISORDER (ADHD), PREDOMINANTLY INATTENTIVE TYPE: ICD-10-CM

## 2019-08-15 DIAGNOSIS — F41.1 GAD (GENERALIZED ANXIETY DISORDER): Primary | ICD-10-CM

## 2019-08-15 DIAGNOSIS — F90.0 ATTENTION DEFICIT HYPERACTIVITY DISORDER (ADHD), PREDOMINANTLY INATTENTIVE TYPE: ICD-10-CM

## 2019-08-15 RX ORDER — LAMOTRIGINE 100 MG/1
TABLET ORAL
Qty: 30 TABLET | Refills: 3 | Status: SHIPPED | OUTPATIENT
Start: 2019-08-15 | End: 2019-09-03 | Stop reason: SDUPTHER

## 2019-08-15 RX ORDER — DEXTROAMPHETAMINE SACCHARATE, AMPHETAMINE ASPARTATE MONOHYDRATE, DEXTROAMPHETAMINE SULFATE AND AMPHETAMINE SULFATE 5; 5; 5; 5 MG/1; MG/1; MG/1; MG/1
20 CAPSULE, EXTENDED RELEASE ORAL EVERY MORNING
Qty: 30 CAPSULE | Refills: 0 | Status: SHIPPED | OUTPATIENT
Start: 2019-08-15 | End: 2019-09-12 | Stop reason: SDUPTHER

## 2019-08-16 DIAGNOSIS — F90.0 ATTENTION DEFICIT HYPERACTIVITY DISORDER (ADHD), PREDOMINANTLY INATTENTIVE TYPE: ICD-10-CM

## 2019-08-19 LAB
6-ACETYLMORPHINE: NOT DETECTED
7-AMINOCLONAZEPAM: NOT DETECTED
ALPHA-OH-ALPRAZOLAM: NOT DETECTED
ALPRAZOLAM: NOT DETECTED
AMPHETAMINE: PRESENT
BARBITURATES: NOT DETECTED
BENZOYLECGONINE: NOT DETECTED
BUPRENORPHINE: NOT DETECTED
CARISOPRODOL: NOT DETECTED
CLONAZEPAM: NOT DETECTED
CODEINE: NOT DETECTED
CREATININE URINE: 223.7 MG/DL (ref 20–400)
DIAZEPAM: NOT DETECTED
EER PAIN MGT DRUG PANEL, HIGH RES/EMIT U: NORMAL
ETHYL GLUCURONIDE: NOT DETECTED
FENTANYL: NOT DETECTED
HYDROCODONE: NOT DETECTED
HYDROMORPHONE: NOT DETECTED
LORAZEPAM: NOT DETECTED
MARIJUANA METABOLITE: NOT DETECTED
MDA: NOT DETECTED
MDEA: NOT DETECTED
MDMA URINE: NOT DETECTED
MEPERIDINE: NOT DETECTED
METHADONE: NOT DETECTED
METHAMPHETAMINE: NOT DETECTED
METHYLPHENIDATE: NOT DETECTED
MIDAZOLAM: NOT DETECTED
MORPHINE: NOT DETECTED
NORBUPRENORPHINE, FREE: NOT DETECTED
NORDIAZEPAM: NOT DETECTED
NORFENTANYL: NOT DETECTED
NORHYDROCODONE, URINE: NOT DETECTED
NOROXYCODONE: NOT DETECTED
NOROXYMORPHONE, URINE: NOT DETECTED
OXAZEPAM: NOT DETECTED
OXYCODONE: NOT DETECTED
OXYMORPHONE: NOT DETECTED
PAIN MANAGEMENT DRUG PANEL: NORMAL
PCP: NOT DETECTED
PHENTERMINE: NOT DETECTED
PROPOXYPHENE: NOT DETECTED
TAPENTADOL, URINE: NOT DETECTED
TAPENTADOL-O-SULFATE, URINE: NOT DETECTED
TEMAZEPAM: NOT DETECTED
TRAMADOL: NOT DETECTED
ZOLPIDEM: NOT DETECTED

## 2019-09-12 DIAGNOSIS — F90.0 ATTENTION DEFICIT HYPERACTIVITY DISORDER (ADHD), PREDOMINANTLY INATTENTIVE TYPE: ICD-10-CM

## 2019-09-13 ENCOUNTER — TELEPHONE (OUTPATIENT)
Dept: FAMILY MEDICINE CLINIC | Age: 34
End: 2019-09-13

## 2019-09-13 RX ORDER — DEXTROAMPHETAMINE SACCHARATE, AMPHETAMINE ASPARTATE MONOHYDRATE, DEXTROAMPHETAMINE SULFATE AND AMPHETAMINE SULFATE 5; 5; 5; 5 MG/1; MG/1; MG/1; MG/1
20 CAPSULE, EXTENDED RELEASE ORAL EVERY MORNING
Qty: 30 CAPSULE | Refills: 0 | Status: SHIPPED | OUTPATIENT
Start: 2019-09-13 | End: 2019-10-11 | Stop reason: ALTCHOICE

## 2019-09-13 NOTE — TELEPHONE ENCOUNTER
requesting medication refill. Rx requested:  Requested Prescriptions     Pending Prescriptions Disp Refills    amphetamine-dextroamphetamine (ADDERALL XR) 20 MG extended release capsule 30 capsule 0     Sig: Take 1 capsule by mouth every morning for 30 days.        Last Office Visit:   6/18/2019    Last Tox screen:    8/16/19  Last Medication contract:    8/15/19    Next Visit Date:  Future Appointments   Date Time Provider Vinicio Stiles   10/9/2019  4:45 PM TAYLOR Barclay -  [place], [place] [address]

## 2019-10-11 ENCOUNTER — E-VISIT (OUTPATIENT)
Dept: FAMILY MEDICINE CLINIC | Age: 34
End: 2019-10-11

## 2019-10-11 ENCOUNTER — OFFICE VISIT (OUTPATIENT)
Dept: FAMILY MEDICINE CLINIC | Age: 34
End: 2019-10-11
Payer: COMMERCIAL

## 2019-10-11 VITALS
HEIGHT: 65 IN | WEIGHT: 282 LBS | TEMPERATURE: 98.3 F | BODY MASS INDEX: 46.98 KG/M2 | DIASTOLIC BLOOD PRESSURE: 78 MMHG | HEART RATE: 91 BPM | SYSTOLIC BLOOD PRESSURE: 116 MMHG | RESPIRATION RATE: 16 BRPM | OXYGEN SATURATION: 99 %

## 2019-10-11 DIAGNOSIS — N39.45 CONTINUOUS LEAKAGE OF URINE: ICD-10-CM

## 2019-10-11 DIAGNOSIS — F90.0 ATTENTION DEFICIT HYPERACTIVITY DISORDER (ADHD), PREDOMINANTLY INATTENTIVE TYPE: Primary | ICD-10-CM

## 2019-10-11 DIAGNOSIS — N92.6 ABNORMAL MENSTRUAL PERIODS: ICD-10-CM

## 2019-10-11 PROCEDURE — G8417 CALC BMI ABV UP PARAM F/U: HCPCS | Performed by: NURSE PRACTITIONER

## 2019-10-11 PROCEDURE — 1036F TOBACCO NON-USER: CPT | Performed by: NURSE PRACTITIONER

## 2019-10-11 PROCEDURE — G8484 FLU IMMUNIZE NO ADMIN: HCPCS | Performed by: NURSE PRACTITIONER

## 2019-10-11 PROCEDURE — G8427 DOCREV CUR MEDS BY ELIG CLIN: HCPCS | Performed by: NURSE PRACTITIONER

## 2019-10-11 PROCEDURE — 99214 OFFICE O/P EST MOD 30 MIN: CPT | Performed by: NURSE PRACTITIONER

## 2019-10-11 RX ORDER — DEXTROAMPHETAMINE SACCHARATE, AMPHETAMINE ASPARTATE MONOHYDRATE, DEXTROAMPHETAMINE SULFATE AND AMPHETAMINE SULFATE 7.5; 7.5; 7.5; 7.5 MG/1; MG/1; MG/1; MG/1
30 CAPSULE, EXTENDED RELEASE ORAL EVERY MORNING
Qty: 30 CAPSULE | Refills: 0 | Status: SHIPPED | OUTPATIENT
Start: 2019-10-11 | End: 2019-11-13 | Stop reason: SDUPTHER

## 2019-10-11 RX ORDER — DEXTROAMPHETAMINE SACCHARATE, AMPHETAMINE ASPARTATE MONOHYDRATE, DEXTROAMPHETAMINE SULFATE AND AMPHETAMINE SULFATE 5; 5; 5; 5 MG/1; MG/1; MG/1; MG/1
20 CAPSULE, EXTENDED RELEASE ORAL EVERY MORNING
Qty: 30 CAPSULE | Refills: 0 | Status: CANCELLED | OUTPATIENT
Start: 2019-10-11 | End: 2019-11-10

## 2019-10-11 RX ORDER — DICYCLOMINE HYDROCHLORIDE 10 MG/1
CAPSULE ORAL
COMMUNITY
Start: 2013-07-17 | End: 2022-02-22 | Stop reason: ALTCHOICE

## 2019-10-11 ASSESSMENT — PATIENT HEALTH QUESTIONNAIRE - GENERAL
HAVE YOU BEEN EXPERIENCING VIVID DREAMS OR NIGHTMARES THAT INTERFERE WITH YOUR SLEEP: N
HAVE YOU BEEN EXPERIENCING HALLUCINATIONS OR CONFUSION: N
HAVE YOU BEEN EXPERIENCING INVOLUNTARY WEIGHT GAIN OR LOSS: N
HAVE YOU BEEN EXPERIENCING NEW OR WORSENING MUSCLE TWITCHING, SHAKINESS, OR OTHER UNUSUAL CHANGES IN YOUR MUSCLE MOVEMENT: N
HAVE YOU BEEN EXPERIENCING NEW OR WORSENING DIFFICULTY WITH URINATION OR CHANGE IN URINARY PATTERN: N
HAVE YOU BEEN EXPERIENCING AN UNUSUALLY DRY MOUTH: Y
HAVE YOU BEEN EXPERIENCING NEW OR WORSENING PROBLEMS WITH YOUR SEXUAL FUNCTION: N
SINCE YOUR LAST VISIT, HAVE YOU EXPERIENCED EPISODES OF FAINTING OR NEAR FAINTING: N
HAVE YOU BEEN EXPERIENCING RACING THOUGHTS OR IMPULSIVE BEHAVIOR: N
HAVE YOU BEEN EXPERIENCING VERY LOW OR VERY HIGH MOODS: N
HAVE YOU BEEN EXPERIENCING NEW OR WORSENING HEADACHES: N
DO YOU HAVE A FASTER HEART RATE THAN USUAL, DOES YOUR HEART RATE FEEL IRREGULAR OR DO YOU FEEL LIKE YOUR HEART IS POUNDING AT REST: N
HAVE YOU BEEN EXPERIENCING ABDOMINAL DISCOMFORT, NAUSEA OR CHANGES IN YOUR BOWEL PATTERN: N
HAVE YOU BEEN EXPERIENCING UNEXPLAINED HIGH FEVERS OR EXCESSIVE SWEATING: N
HAVE YOU BEEN EXPERIENCING NEW OR WORSENING VISUAL CHANGES: N
DO YOU HAVE ANY NEW RASHES OR UNEXPLAINED ITCHING OR SWELLING: N
HAVE YOU BEEN EXPERIENCING LIGHT HEADEDNESS, WOOZINESS, OR DIZZINESS, ESPECIALLY UPON STANDING FROM SITTING OR LYING POSITIONS: N

## 2019-10-11 ASSESSMENT — ANXIETY QUESTIONNAIRES
6. BECOMING EASILY ANNOYED OR IRRITABLE: NOT AT ALL SURE
3. WORRYING TOO MUCH ABOUT DIFFERENT THINGS: 0-NOT AT ALL SURE
5. BEING SO RESTLESS THAT IT IS HARD TO SIT STILL: NOT AT ALL SURE
1. FEELING NERVOUS, ANXIOUS, OR ON EDGE: NOT AT ALL SURE
4. TROUBLE RELAXING: 0-NOT AT ALL SURE
4. TROUBLE RELAXING: NOT AT ALL SURE
GAD7 TOTAL SCORE: 0
GAD7 TOTAL SCORE: 0
1. FEELING NERVOUS, ANXIOUS, OR ON EDGE: 0-NOT AT ALL SURE
2. NOT BEING ABLE TO STOP OR CONTROL WORRYING: NOT AT ALL SURE
7. FEELING AFRAID AS IF SOMETHING AWFUL MIGHT HAPPEN: 0-NOT AT ALL SURE
3. WORRYING TOO MUCH ABOUT DIFFERENT THINGS: NOT AT ALL SURE
5. BEING SO RESTLESS THAT IT IS HARD TO SIT STILL: 0-NOT AT ALL SURE
7. FEELING AFRAID AS IF SOMETHING AWFUL MIGHT HAPPEN: NOT AT ALL SURE
2. NOT BEING ABLE TO STOP OR CONTROL WORRYING: 0-NOT AT ALL SURE
6. BECOMING EASILY ANNOYED OR IRRITABLE: 0-NOT AT ALL SURE

## 2019-10-11 ASSESSMENT — PATIENT HEALTH QUESTIONNAIRE - PHQ9
1. LITTLE INTEREST OR PLEASURE IN DOING THINGS: 0
9. THOUGHTS THAT YOU WOULD BE BETTER OFF DEAD, OR OF HURTING YOURSELF: 0
SUM OF ALL RESPONSES TO PHQ QUESTIONS 1-9: 0
2. FEELING DOWN, DEPRESSED OR HOPELESS: NOT AT ALL
7. TROUBLE CONCENTRATING ON THINGS, SUCH AS READING THE NEWSPAPER OR WATCHING TELEVISION: 0
8. MOVING OR SPEAKING SO SLOWLY THAT OTHER PEOPLE COULD HAVE NOTICED. OR THE OPPOSITE, BEING SO FIGETY OR RESTLESS THAT YOU HAVE BEEN MOVING AROUND A LOT MORE THAN USUAL: 0
7. TROUBLE CONCENTRATING ON THINGS, SUCH AS READING THE NEWSPAPER OR WATCHING TELEVISION: NOT AT ALL
2. FEELING DOWN, DEPRESSED OR HOPELESS: 0
5. POOR APPETITE OR OVEREATING: NOT AT ALL
SUM OF ALL RESPONSES TO PHQ QUESTIONS 1-9: 0
SUM OF ALL RESPONSES TO PHQ QUESTIONS 1-9: 0
4. FEELING TIRED OR HAVING LITTLE ENERGY: NOT AT ALL
6. FEELING BAD ABOUT YOURSELF - OR THAT YOU ARE A FAILURE OR HAVE LET YOURSELF OR YOUR FAMILY DOWN: 0
1. LITTLE INTEREST OR PLEASURE IN DOING THINGS: NOT AT ALL
10. IF YOU CHECKED OFF ANY PROBLEMS, HOW DIFFICULT HAVE THESE PROBLEMS MADE IT FOR YOU TO DO YOUR WORK, TAKE CARE OF THINGS AT HOME, OR GET ALONG WITH OTHER PEOPLE: NOT DIFFICULT AT ALL
5. POOR APPETITE OR OVEREATING: 0
4. FEELING TIRED OR HAVING LITTLE ENERGY: 0
3. TROUBLE FALLING OR STAYING ASLEEP: NOT AT ALL
9. THOUGHTS THAT YOU WOULD BE BETTER OFF DEAD, OR OF HURTING YOURSELF: NOT AT ALL
SUM OF ALL RESPONSES TO PHQ9 QUESTIONS 1 & 2: 0
8. MOVING OR SPEAKING SO SLOWLY THAT OTHER PEOPLE COULD HAVE NOTICED. OR THE OPPOSITE - BEING SO FIDGETY OR RESTLESS THAT YOU HAVE BEEN MOVING AROUND A LOT MORE THAN USUAL: NOT AT ALL
6. FEELING BAD ABOUT YOURSELF - OR THAT YOU ARE A FAILURE OR HAVE LET YOURSELF OR YOUR FAMILY DOWN: NOT AT ALL

## 2019-10-13 ASSESSMENT — ENCOUNTER SYMPTOMS
WHEEZING: 0
CHEST TIGHTNESS: 0
SHORTNESS OF BREATH: 0
GASTROINTESTINAL NEGATIVE: 1
EYES NEGATIVE: 1
COUGH: 0

## 2019-11-13 DIAGNOSIS — F90.0 ATTENTION DEFICIT HYPERACTIVITY DISORDER (ADHD), PREDOMINANTLY INATTENTIVE TYPE: ICD-10-CM

## 2019-11-14 RX ORDER — DEXTROAMPHETAMINE SACCHARATE, AMPHETAMINE ASPARTATE MONOHYDRATE, DEXTROAMPHETAMINE SULFATE AND AMPHETAMINE SULFATE 7.5; 7.5; 7.5; 7.5 MG/1; MG/1; MG/1; MG/1
30 CAPSULE, EXTENDED RELEASE ORAL EVERY MORNING
Qty: 30 CAPSULE | Refills: 0 | Status: SHIPPED | OUTPATIENT
Start: 2019-11-14 | End: 2019-12-14 | Stop reason: SDUPTHER

## 2019-11-15 ENCOUNTER — TELEPHONE (OUTPATIENT)
Dept: FAMILY MEDICINE CLINIC | Age: 34
End: 2019-11-15

## 2019-12-14 DIAGNOSIS — F90.0 ATTENTION DEFICIT HYPERACTIVITY DISORDER (ADHD), PREDOMINANTLY INATTENTIVE TYPE: ICD-10-CM

## 2019-12-17 ENCOUNTER — TELEPHONE (OUTPATIENT)
Dept: FAMILY MEDICINE CLINIC | Age: 34
End: 2019-12-17

## 2019-12-17 RX ORDER — DEXTROAMPHETAMINE SACCHARATE, AMPHETAMINE ASPARTATE MONOHYDRATE, DEXTROAMPHETAMINE SULFATE AND AMPHETAMINE SULFATE 7.5; 7.5; 7.5; 7.5 MG/1; MG/1; MG/1; MG/1
30 CAPSULE, EXTENDED RELEASE ORAL EVERY MORNING
Qty: 30 CAPSULE | Refills: 0 | Status: SHIPPED | OUTPATIENT
Start: 2019-12-17 | End: 2020-01-12 | Stop reason: SDUPTHER

## 2020-01-06 RX ORDER — LAMOTRIGINE 100 MG/1
TABLET ORAL
Qty: 60 TABLET | Refills: 3 | Status: SHIPPED | OUTPATIENT
Start: 2020-01-06 | End: 2022-02-22 | Stop reason: ALTCHOICE

## 2020-01-08 ENCOUNTER — E-VISIT (OUTPATIENT)
Dept: FAMILY MEDICINE CLINIC | Age: 35
End: 2020-01-08
Payer: COMMERCIAL

## 2020-01-08 PROCEDURE — 98971 NQHP OL DIG ASSMT&MGMT 11-20: CPT | Performed by: NURSE PRACTITIONER

## 2020-01-08 ASSESSMENT — ANXIETY QUESTIONNAIRES
GAD7 TOTAL SCORE: 6
5. BEING SO RESTLESS THAT IT IS HARD TO SIT STILL: SEVERAL DAYS
7. FEELING AFRAID AS IF SOMETHING AWFUL MIGHT HAPPEN: 0-NOT AT ALL
2. NOT BEING ABLE TO STOP OR CONTROL WORRYING: 1-SEVERAL DAYS
1. FEELING NERVOUS, ANXIOUS, OR ON EDGE: 1-SEVERAL DAYS
GAD7 TOTAL SCORE: 6
1. FEELING NERVOUS, ANXIOUS, OR ON EDGE: SEVERAL DAYS
7. FEELING AFRAID AS IF SOMETHING AWFUL MIGHT HAPPEN: NOT AT ALL
6. BECOMING EASILY ANNOYED OR IRRITABLE: SEVERAL DAYS
4. TROUBLE RELAXING: 1-SEVERAL DAYS
2. NOT BEING ABLE TO STOP OR CONTROL WORRYING: SEVERAL DAYS
3. WORRYING TOO MUCH ABOUT DIFFERENT THINGS: SEVERAL DAYS
4. TROUBLE RELAXING: SEVERAL DAYS
5. BEING SO RESTLESS THAT IT IS HARD TO SIT STILL: 1-SEVERAL DAYS
6. BECOMING EASILY ANNOYED OR IRRITABLE: 1-SEVERAL DAYS
3. WORRYING TOO MUCH ABOUT DIFFERENT THINGS: 1-SEVERAL DAYS

## 2020-01-08 ASSESSMENT — PATIENT HEALTH QUESTIONNAIRE - GENERAL
HAVE YOU BEEN EXPERIENCING ABDOMINAL DISCOMFORT, NAUSEA OR CHANGES IN YOUR BOWEL PATTERN: N
HAVE YOU BEEN EXPERIENCING NEW OR WORSENING HEADACHES: N
HAVE YOU BEEN EXPERIENCING NEW OR WORSENING PROBLEMS WITH YOUR SEXUAL FUNCTION: N
HAVE YOU BEEN EXPERIENCING UNEXPLAINED HIGH FEVERS OR EXCESSIVE SWEATING: N
DO YOU HAVE A FASTER HEART RATE THAN USUAL, DOES YOUR HEART RATE FEEL IRREGULAR OR DO YOU FEEL LIKE YOUR HEART IS POUNDING AT REST: N
DO YOU HAVE ANY NEW RASHES OR UNEXPLAINED ITCHING OR SWELLING: N
HAVE YOU BEEN EXPERIENCING NEW OR WORSENING MUSCLE TWITCHING, SHAKINESS, OR OTHER UNUSUAL CHANGES IN YOUR MUSCLE MOVEMENT: N
HAVE YOU BEEN EXPERIENCING HALLUCINATIONS OR CONFUSION: N
HAVE YOU BEEN EXPERIENCING VERY LOW OR VERY HIGH MOODS: N
SINCE YOUR LAST VISIT, HAVE YOU EXPERIENCED EPISODES OF FAINTING OR NEAR FAINTING: N
HAVE YOU BEEN EXPERIENCING VIVID DREAMS OR NIGHTMARES THAT INTERFERE WITH YOUR SLEEP: N
HAVE YOU BEEN EXPERIENCING RACING THOUGHTS OR IMPULSIVE BEHAVIOR: N
HAVE YOU BEEN EXPERIENCING INVOLUNTARY WEIGHT GAIN OR LOSS: N
HAVE YOU BEEN EXPERIENCING LIGHT HEADEDNESS, WOOZINESS, OR DIZZINESS, ESPECIALLY UPON STANDING FROM SITTING OR LYING POSITIONS: N
HAVE YOU BEEN EXPERIENCING NEW OR WORSENING VISUAL CHANGES: N
HAVE YOU BEEN EXPERIENCING AN UNUSUALLY DRY MOUTH: N
HAVE YOU BEEN EXPERIENCING NEW OR WORSENING DIFFICULTY WITH URINATION OR CHANGE IN URINARY PATTERN: N

## 2020-01-08 ASSESSMENT — PATIENT HEALTH QUESTIONNAIRE - PHQ9
SUM OF ALL RESPONSES TO PHQ QUESTIONS 1-9: 5
8. MOVING OR SPEAKING SO SLOWLY THAT OTHER PEOPLE COULD HAVE NOTICED. OR THE OPPOSITE - BEING SO FIDGETY OR RESTLESS THAT YOU HAVE BEEN MOVING AROUND A LOT MORE THAN USUAL: NOT AT ALL
6. FEELING BAD ABOUT YOURSELF - OR THAT YOU ARE A FAILURE OR HAVE LET YOURSELF OR YOUR FAMILY DOWN: 1
SUM OF ALL RESPONSES TO PHQ QUESTIONS 1-9: 4
2. FEELING DOWN, DEPRESSED OR HOPELESS: NOT AT ALL
3. TROUBLE FALLING OR STAYING ASLEEP: SEVERAL DAYS
5. POOR APPETITE OR OVEREATING: SEVERAL DAYS
6. FEELING BAD ABOUT YOURSELF - OR THAT YOU ARE A FAILURE OR HAVE LET YOURSELF OR YOUR FAMILY DOWN: SEVERAL DAYS
2. FEELING DOWN, DEPRESSED OR HOPELESS: 0
9. THOUGHTS THAT YOU WOULD BE BETTER OFF DEAD, OR OF HURTING YOURSELF: 0
1. LITTLE INTEREST OR PLEASURE IN DOING THINGS: NOT AT ALL
7. TROUBLE CONCENTRATING ON THINGS, SUCH AS READING THE NEWSPAPER OR WATCHING TELEVISION: SEVERAL DAYS
4. FEELING TIRED OR HAVING LITTLE ENERGY: SEVERAL DAYS
1. LITTLE INTEREST OR PLEASURE IN DOING THINGS: 0
9. THOUGHTS THAT YOU WOULD BE BETTER OFF DEAD, OR OF HURTING YOURSELF: NOT AT ALL
8. MOVING OR SPEAKING SO SLOWLY THAT OTHER PEOPLE COULD HAVE NOTICED. OR THE OPPOSITE, BEING SO FIGETY OR RESTLESS THAT YOU HAVE BEEN MOVING AROUND A LOT MORE THAN USUAL: 0
5. POOR APPETITE OR OVEREATING: 1
SUM OF ALL RESPONSES TO PHQ QUESTIONS 1-9: 4
4. FEELING TIRED OR HAVING LITTLE ENERGY: 1
10. IF YOU CHECKED OFF ANY PROBLEMS, HOW DIFFICULT HAVE THESE PROBLEMS MADE IT FOR YOU TO DO YOUR WORK, TAKE CARE OF THINGS AT HOME, OR GET ALONG WITH OTHER PEOPLE: SOMEWHAT DIFFICULT
7. TROUBLE CONCENTRATING ON THINGS, SUCH AS READING THE NEWSPAPER OR WATCHING TELEVISION: 1
SUM OF ALL RESPONSES TO PHQ9 QUESTIONS 1 & 2: 0

## 2020-01-13 RX ORDER — DEXTROAMPHETAMINE SACCHARATE, AMPHETAMINE ASPARTATE MONOHYDRATE, DEXTROAMPHETAMINE SULFATE AND AMPHETAMINE SULFATE 7.5; 7.5; 7.5; 7.5 MG/1; MG/1; MG/1; MG/1
30 CAPSULE, EXTENDED RELEASE ORAL EVERY MORNING
Qty: 30 CAPSULE | Refills: 0 | Status: SHIPPED | OUTPATIENT
Start: 2020-01-13 | End: 2020-02-12 | Stop reason: SDUPTHER

## 2020-02-13 ENCOUNTER — TELEPHONE (OUTPATIENT)
Dept: FAMILY MEDICINE CLINIC | Age: 35
End: 2020-02-13

## 2020-02-13 RX ORDER — DEXTROAMPHETAMINE SACCHARATE, AMPHETAMINE ASPARTATE MONOHYDRATE, DEXTROAMPHETAMINE SULFATE AND AMPHETAMINE SULFATE 7.5; 7.5; 7.5; 7.5 MG/1; MG/1; MG/1; MG/1
30 CAPSULE, EXTENDED RELEASE ORAL EVERY MORNING
Qty: 30 CAPSULE | Refills: 0 | Status: SHIPPED | OUTPATIENT
Start: 2020-02-13 | End: 2020-03-14 | Stop reason: SDUPTHER

## 2020-02-13 NOTE — TELEPHONE ENCOUNTER
Called and spoke to Felicity Alfonso letting her know that her prescription for Adderall is ready to be picked up. Patient is up to date on both med contract and tox screen.

## 2020-02-15 NOTE — TELEPHONE ENCOUNTER
Rx requested:  Requested Prescriptions     Pending Prescriptions Disp Refills    busPIRone (BUSPAR) 30 MG tablet 60 tablet 5     Sig: Take 1 tablet by mouth twice a day       Last Office Visit:   10/11/2019      Next Visit Date:  No future appointments.

## 2020-02-17 RX ORDER — BUSPIRONE HYDROCHLORIDE 30 MG/1
TABLET ORAL
Qty: 60 TABLET | Refills: 5 | Status: SHIPPED | OUTPATIENT
Start: 2020-02-17 | End: 2020-08-19 | Stop reason: SDUPTHER

## 2020-02-25 RX ORDER — ARIPIPRAZOLE 15 MG/1
TABLET ORAL
Qty: 90 TABLET | Refills: 3 | Status: SHIPPED | OUTPATIENT
Start: 2020-02-25 | End: 2021-08-20

## 2020-02-25 RX ORDER — VENLAFAXINE HYDROCHLORIDE 150 MG/1
150 CAPSULE, EXTENDED RELEASE ORAL DAILY
Qty: 90 CAPSULE | Refills: 3 | Status: SHIPPED | OUTPATIENT
Start: 2020-02-25 | End: 2022-02-22 | Stop reason: ALTCHOICE

## 2020-02-28 ENCOUNTER — TELEPHONE (OUTPATIENT)
Dept: FAMILY MEDICINE CLINIC | Age: 35
End: 2020-02-28

## 2020-03-06 RX ORDER — TRAZODONE HYDROCHLORIDE 50 MG/1
50 TABLET ORAL NIGHTLY
Qty: 30 TABLET | Refills: 5 | Status: SHIPPED | OUTPATIENT
Start: 2020-03-06 | End: 2020-09-09 | Stop reason: SDUPTHER

## 2020-03-06 NOTE — TELEPHONE ENCOUNTER
Rx requested:  Requested Prescriptions     Pending Prescriptions Disp Refills    traZODone (DESYREL) 50 MG tablet 30 tablet 5     Sig: Take 1 tablet by mouth nightly       Last Office Visit:   10/11/2019      Next Visit Date:  No future appointments.

## 2020-03-16 RX ORDER — DEXTROAMPHETAMINE SACCHARATE, AMPHETAMINE ASPARTATE MONOHYDRATE, DEXTROAMPHETAMINE SULFATE AND AMPHETAMINE SULFATE 7.5; 7.5; 7.5; 7.5 MG/1; MG/1; MG/1; MG/1
30 CAPSULE, EXTENDED RELEASE ORAL EVERY MORNING
Qty: 30 CAPSULE | Refills: 0 | Status: SHIPPED | OUTPATIENT
Start: 2020-03-16 | End: 2020-04-15 | Stop reason: SDUPTHER

## 2020-03-31 ENCOUNTER — E-VISIT (OUTPATIENT)
Dept: FAMILY MEDICINE CLINIC | Age: 35
End: 2020-03-31

## 2020-03-31 ASSESSMENT — PATIENT HEALTH QUESTIONNAIRE - GENERAL
HAVE YOU BEEN EXPERIENCING NEW OR WORSENING VISUAL CHANGES: N
HAVE YOU BEEN EXPERIENCING UNEXPLAINED HIGH FEVERS OR EXCESSIVE SWEATING: N
HAVE YOU BEEN EXPERIENCING VERY LOW OR VERY HIGH MOODS: N
HAVE YOU BEEN EXPERIENCING NEW OR WORSENING DIFFICULTY WITH URINATION OR CHANGE IN URINARY PATTERN: N
HAVE YOU BEEN EXPERIENCING HALLUCINATIONS OR CONFUSION: N
HAVE YOU BEEN EXPERIENCING RACING THOUGHTS OR IMPULSIVE BEHAVIOR: N
HAVE YOU BEEN EXPERIENCING ABDOMINAL DISCOMFORT, NAUSEA OR CHANGES IN YOUR BOWEL PATTERN: N
HAVE YOU BEEN EXPERIENCING NEW OR WORSENING HEADACHES: N
HAVE YOU BEEN EXPERIENCING AN UNUSUALLY DRY MOUTH: N
HAVE YOU BEEN EXPERIENCING VIVID DREAMS OR NIGHTMARES THAT INTERFERE WITH YOUR SLEEP: N
HAVE YOU BEEN EXPERIENCING NEW OR WORSENING MUSCLE TWITCHING, SHAKINESS, OR OTHER UNUSUAL CHANGES IN YOUR MUSCLE MOVEMENT: N
SINCE YOUR LAST VISIT, HAVE YOU EXPERIENCED EPISODES OF FAINTING OR NEAR FAINTING: N
DO YOU HAVE ANY NEW RASHES OR UNEXPLAINED ITCHING OR SWELLING: N
HAVE YOU BEEN EXPERIENCING LIGHT HEADEDNESS, WOOZINESS, OR DIZZINESS, ESPECIALLY UPON STANDING FROM SITTING OR LYING POSITIONS: N
HAVE YOU BEEN EXPERIENCING INVOLUNTARY WEIGHT GAIN OR LOSS: N
HAVE YOU BEEN EXPERIENCING NEW OR WORSENING PROBLEMS WITH YOUR SEXUAL FUNCTION: N
DO YOU HAVE A FASTER HEART RATE THAN USUAL, DOES YOUR HEART RATE FEEL IRREGULAR OR DO YOU FEEL LIKE YOUR HEART IS POUNDING AT REST: N

## 2020-03-31 ASSESSMENT — PATIENT HEALTH QUESTIONNAIRE - PHQ9
SUM OF ALL RESPONSES TO PHQ QUESTIONS 1-9: 1
10. IF YOU CHECKED OFF ANY PROBLEMS, HOW DIFFICULT HAVE THESE PROBLEMS MADE IT FOR YOU TO DO YOUR WORK, TAKE CARE OF THINGS AT HOME, OR GET ALONG WITH OTHER PEOPLE: NOT DIFFICULT AT ALL
SUM OF ALL RESPONSES TO PHQ QUESTIONS 1-9: 1
8. MOVING OR SPEAKING SO SLOWLY THAT OTHER PEOPLE COULD HAVE NOTICED. OR THE OPPOSITE - BEING SO FIDGETY OR RESTLESS THAT YOU HAVE BEEN MOVING AROUND A LOT MORE THAN USUAL: NOT AT ALL
7. TROUBLE CONCENTRATING ON THINGS, SUCH AS READING THE NEWSPAPER OR WATCHING TELEVISION: 0
1. LITTLE INTEREST OR PLEASURE IN DOING THINGS: SEVERAL DAYS
5. POOR APPETITE OR OVEREATING: 0
3. TROUBLE FALLING OR STAYING ASLEEP: NOT AT ALL
2. FEELING DOWN, DEPRESSED OR HOPELESS: NOT AT ALL
6. FEELING BAD ABOUT YOURSELF - OR THAT YOU ARE A FAILURE OR HAVE LET YOURSELF OR YOUR FAMILY DOWN: 0
4. FEELING TIRED OR HAVING LITTLE ENERGY: NOT AT ALL
9. THOUGHTS THAT YOU WOULD BE BETTER OFF DEAD, OR OF HURTING YOURSELF: NOT AT ALL
5. POOR APPETITE OR OVEREATING: NOT AT ALL
8. MOVING OR SPEAKING SO SLOWLY THAT OTHER PEOPLE COULD HAVE NOTICED. OR THE OPPOSITE, BEING SO FIGETY OR RESTLESS THAT YOU HAVE BEEN MOVING AROUND A LOT MORE THAN USUAL: 0
9. THOUGHTS THAT YOU WOULD BE BETTER OFF DEAD, OR OF HURTING YOURSELF: 0
1. LITTLE INTEREST OR PLEASURE IN DOING THINGS: 1
SUM OF ALL RESPONSES TO PHQ QUESTIONS 1-9: 1
7. TROUBLE CONCENTRATING ON THINGS, SUCH AS READING THE NEWSPAPER OR WATCHING TELEVISION: NOT AT ALL
2. FEELING DOWN, DEPRESSED OR HOPELESS: 0
4. FEELING TIRED OR HAVING LITTLE ENERGY: 0
6. FEELING BAD ABOUT YOURSELF - OR THAT YOU ARE A FAILURE OR HAVE LET YOURSELF OR YOUR FAMILY DOWN: NOT AT ALL
SUM OF ALL RESPONSES TO PHQ9 QUESTIONS 1 & 2: 1

## 2020-03-31 ASSESSMENT — ANXIETY QUESTIONNAIRES
1. FEELING NERVOUS, ANXIOUS, OR ON EDGE: 0-NOT AT ALL
GAD7 TOTAL SCORE: 0
3. WORRYING TOO MUCH ABOUT DIFFERENT THINGS: 0-NOT AT ALL
4. TROUBLE RELAXING: NOT AT ALL
5. BEING SO RESTLESS THAT IT IS HARD TO SIT STILL: NOT AT ALL
6. BECOMING EASILY ANNOYED OR IRRITABLE: NOT AT ALL
7. FEELING AFRAID AS IF SOMETHING AWFUL MIGHT HAPPEN: 0-NOT AT ALL
6. BECOMING EASILY ANNOYED OR IRRITABLE: 0-NOT AT ALL
5. BEING SO RESTLESS THAT IT IS HARD TO SIT STILL: 0-NOT AT ALL
4. TROUBLE RELAXING: 0-NOT AT ALL
2. NOT BEING ABLE TO STOP OR CONTROL WORRYING: NOT AT ALL
1. FEELING NERVOUS, ANXIOUS, OR ON EDGE: NOT AT ALL
7. FEELING AFRAID AS IF SOMETHING AWFUL MIGHT HAPPEN: NOT AT ALL
3. WORRYING TOO MUCH ABOUT DIFFERENT THINGS: NOT AT ALL
GAD7 TOTAL SCORE: 0
2. NOT BEING ABLE TO STOP OR CONTROL WORRYING: 0-NOT AT ALL

## 2020-04-01 ENCOUNTER — VIRTUAL VISIT (OUTPATIENT)
Dept: FAMILY MEDICINE CLINIC | Age: 35
End: 2020-04-01
Payer: COMMERCIAL

## 2020-04-01 VITALS
SYSTOLIC BLOOD PRESSURE: 122 MMHG | RESPIRATION RATE: 16 BRPM | TEMPERATURE: 97.8 F | DIASTOLIC BLOOD PRESSURE: 80 MMHG | HEART RATE: 77 BPM

## 2020-04-01 PROCEDURE — 99213 OFFICE O/P EST LOW 20 MIN: CPT | Performed by: NURSE PRACTITIONER

## 2020-04-01 ASSESSMENT — ENCOUNTER SYMPTOMS
WHEEZING: 0
COUGH: 0
SHORTNESS OF BREATH: 0
NAUSEA: 0
VOMITING: 0

## 2020-04-01 NOTE — PROGRESS NOTES
Subjective:     Patient ID: Tara Jones is a 36 y.o. female who presentstoday for:  Chief Complaint   Patient presents with    ADHD     4/1/2020    TELEHEALTH EVALUATION -- Audio/Visual (During MRNQI-78 public health emergency)    Due to COVID 19 outbreak, patient's office visit was converted to a virtual visit. Patient was contacted and agreed to proceed with a virtual visit via [email or web address] me  The risks and benefits of converting to a virtual visit were discussed in light of the current infectious disease epidemic. Patient also understood that insurance coverage and co-pays are up to their individual insurance plans. HPI  ADD/ADHD:  Current treatment: Adderall XR- 30mg, which has been effective. Residual symptoms: none. Medication side effects: None. Patient denies anorexia, nausea, vomiting, abdominal pain, involuntary weight loss, palpitations, insomnia, irritability, anxiety, headache and tremor. Past Medical History:   Diagnosis Date    Abnormal Pap smear of cervix     JOHANNA (generalized anxiety disorder)     Hyperlipidemia     Major depression     Sleep disorder      Current Outpatient Medications on File Prior to Visit   Medication Sig Dispense Refill    amphetamine-dextroamphetamine (ADDERALL XR) 30 MG extended release capsule Take 1 capsule by mouth every morning for 30 days.  30 capsule 0    traZODone (DESYREL) 50 MG tablet Take 1 tablet by mouth nightly 30 tablet 5    venlafaxine (EFFEXOR XR) 150 MG extended release capsule Take 1 capsule by mouth daily 90 capsule 3    ARIPiprazole (ABILIFY) 15 MG tablet Take 1 tablet by mouth at bedtime 90 tablet 3    busPIRone (BUSPAR) 30 MG tablet Take 1 tablet by mouth twice a day 60 tablet 5    lamoTRIgine (LAMICTAL) 100 MG tablet Take 1 tablet by mouth twice daily 60 tablet 3    dicyclomine (BENTYL) 10 MG capsule Take by mouth      vitamin D (ERGOCALCIFEROL) 89041 units CAPS capsule Take 1 capsule by mouth once a week 12 capsule 1    wheezing. Cardiovascular: Negative for chest pain and palpitations. Gastrointestinal: Negative for nausea and vomiting. Musculoskeletal: Negative for arthralgias and myalgias. Neurological: Negative for dizziness, syncope and light-headedness. Psychiatric/Behavioral: Negative. Objective:    /80   Pulse 77   Temp 97.8 °F (36.6 °C)   Resp 16     Physical Exam  Constitutional:       Appearance: Normal appearance. HENT:      Head: Atraumatic. Eyes:      Conjunctiva/sclera: Conjunctivae normal.   Neck:      Musculoskeletal: Neck supple. Cardiovascular:      Rate and Rhythm: Normal rate. Pulmonary:      Effort: No respiratory distress. Neurological:      General: No focal deficit present. Mental Status: She is alert and oriented to person, place, and time. Psychiatric:         Attention and Perception: Attention normal.         Mood and Affect: Mood normal.         Speech: Speech normal.         Behavior: Behavior normal.         Thought Content: Thought content normal.         Assessment & Plan:       Diagnosis Orders   1. Attention deficit hyperactivity disorder (ADHD), predominantly inattentive type       No orders of the defined types were placed in this encounter. No orders of the defined types were placed in this encounter. There are no discontinued medications. Return in about 3 months (around 7/1/2020). Reviewed with the patient: currentclinical status, medications, activities and diet. Side effects, adverse effects of the medicationprescribed today, as well as treatment plan/ rationale and result expectations havebeen discussed with the patient who expresses understanding and desires to proceed. Pt instructions reviewed and given to patient.     Close follow up to evaluate treatment resultsand for coordination of care. I have reviewed the patient's medical historyin detail and updated the computerized patient record.     TAYLOR Farah - CNP

## 2020-04-15 RX ORDER — DEXTROAMPHETAMINE SACCHARATE, AMPHETAMINE ASPARTATE MONOHYDRATE, DEXTROAMPHETAMINE SULFATE AND AMPHETAMINE SULFATE 7.5; 7.5; 7.5; 7.5 MG/1; MG/1; MG/1; MG/1
30 CAPSULE, EXTENDED RELEASE ORAL EVERY MORNING
Qty: 30 CAPSULE | Refills: 0 | Status: SHIPPED | OUTPATIENT
Start: 2020-04-15 | End: 2020-05-17 | Stop reason: SDUPTHER

## 2020-04-15 NOTE — TELEPHONE ENCOUNTER
requesting medication refill. Rx requested:  Requested Prescriptions     Pending Prescriptions Disp Refills    amphetamine-dextroamphetamine (ADDERALL XR) 30 MG extended release capsule 30 capsule 0     Sig: Take 1 capsule by mouth every morning for 30 days. Last Office Visit:   04/01/2020    Last Tox screen:    08/16/2019    Last Medication contract:    06/18/2019    Next Visit Date:  No future appointments.

## 2020-05-18 RX ORDER — DEXTROAMPHETAMINE SACCHARATE, AMPHETAMINE ASPARTATE MONOHYDRATE, DEXTROAMPHETAMINE SULFATE AND AMPHETAMINE SULFATE 7.5; 7.5; 7.5; 7.5 MG/1; MG/1; MG/1; MG/1
30 CAPSULE, EXTENDED RELEASE ORAL EVERY MORNING
Qty: 30 CAPSULE | Refills: 0 | Status: SHIPPED | OUTPATIENT
Start: 2020-05-18 | End: 2020-06-18 | Stop reason: SDUPTHER

## 2020-05-18 NOTE — TELEPHONE ENCOUNTER
requesting medication refill. Rx requested:  Requested Prescriptions     Pending Prescriptions Disp Refills    amphetamine-dextroamphetamine (ADDERALL XR) 30 MG extended release capsule 30 capsule 0     Sig: Take 1 capsule by mouth every morning for 30 days.        Last Office Visit:   04/01/2020    Last Tox screen:    08/16/2019    Last Medication contract:    06/18/2019    Next Visit Date:  No future appointments.'

## 2020-06-22 RX ORDER — DEXTROAMPHETAMINE SACCHARATE, AMPHETAMINE ASPARTATE MONOHYDRATE, DEXTROAMPHETAMINE SULFATE AND AMPHETAMINE SULFATE 7.5; 7.5; 7.5; 7.5 MG/1; MG/1; MG/1; MG/1
30 CAPSULE, EXTENDED RELEASE ORAL EVERY MORNING
Qty: 30 CAPSULE | Refills: 0 | Status: SHIPPED | OUTPATIENT
Start: 2020-06-22 | End: 2020-07-19 | Stop reason: SDUPTHER

## 2020-06-25 ENCOUNTER — E-VISIT (OUTPATIENT)
Dept: FAMILY MEDICINE CLINIC | Age: 35
End: 2020-06-25

## 2020-06-29 ENCOUNTER — VIRTUAL VISIT (OUTPATIENT)
Dept: FAMILY MEDICINE CLINIC | Age: 35
End: 2020-06-29
Payer: COMMERCIAL

## 2020-06-29 PROCEDURE — 99214 OFFICE O/P EST MOD 30 MIN: CPT | Performed by: NURSE PRACTITIONER

## 2020-06-29 PROCEDURE — G8427 DOCREV CUR MEDS BY ELIG CLIN: HCPCS | Performed by: NURSE PRACTITIONER

## 2020-06-29 RX ORDER — DEXTROAMPHETAMINE SACCHARATE, AMPHETAMINE ASPARTATE, DEXTROAMPHETAMINE SULFATE AND AMPHETAMINE SULFATE 1.25; 1.25; 1.25; 1.25 MG/1; MG/1; MG/1; MG/1
5 TABLET ORAL
Qty: 30 TABLET | Refills: 0 | Status: SHIPPED | OUTPATIENT
Start: 2020-06-29 | End: 2021-08-20

## 2020-06-29 ASSESSMENT — ENCOUNTER SYMPTOMS
WHEEZING: 0
SHORTNESS OF BREATH: 0
COUGH: 0
VOMITING: 0
NAUSEA: 0

## 2020-06-29 NOTE — PROGRESS NOTES
Subjective:     Patient ID: Pamela Lundberg is a 36 y.o. female who presentstoday for:  Chief Complaint   Patient presents with    ADHD         TELEHEALTH EVALUATION -- Audio/Visual (During STXRG-89 public health emergency)    -   Pamela Lundberg is a 36 y.o. female being evaluated by a Virtual Visit (video visit) encounter to address concerns as mentioned above. A caregiver was present when appropriate. Due to this being a TeleHealth encounter (During RQZWK-85 public health emergency), evaluation of the following organ systems was limited: Vitals/Constitutional/EENT/Resp/CV/GI//MS/Neuro/Skin/Heme-Lymph-Imm. Pursuant to the emergency declaration under the [address] [place] [address] authority and the [employer] Act, this Virtual Visit was conducted with patient's (and/or legal guardian's) consent, to reduce the patient's risk of exposure to COVID-19 and provide necessary medical care. The patient (and/or legal guardian) has also been advised to contact this office for worsening conditions or problems, and seek emergency medical treatment and/or call 911 if deemed necessary. Services were provided through a video synchronous discussion virtually to substitute for in-person clinic visit. Type of encounter was _x_ Doxy __ MyChart ___Facetime    Patient was located at their home. Provider was located at their __x_ home or        ____ office. --TAYLOR Willoughby - CNP on 7/8/2020 at 8:12 AM    An electronic signature was used to authenticate this note. Hyperlipidemia   This is a chronic problem. The current episode started more than 1 year ago. Recent lipid tests were reviewed and are variable. Exacerbating diseases include obesity. She has no history of chronic renal disease, diabetes, hypothyroidism, liver disease or nephrotic syndrome.  Pertinent negatives include no chest pain, focal sensory loss, focal weakness, leg pain, myalgias or shortness of breath. She is currently on no antihyperlipidemic treatment. There are no compliance problems. ADD/ADHD:  Current treatment: Adderall XR- 30mg, which has been somewhat effective. Residual symptoms: inattention. Medication side effects: None. Patient denies anorexia, nausea, vomiting, abdominal pain, involuntary weight loss, palpitations, insomnia, irritability, anxiety, headache and tremor. Past Medical History:   Diagnosis Date    Abnormal Pap smear of cervix     JOHANNA (generalized anxiety disorder)     Hyperlipidemia     Major depression     Sleep disorder      Current Outpatient Medications on File Prior to Visit   Medication Sig Dispense Refill    amphetamine-dextroamphetamine (ADDERALL XR) 30 MG extended release capsule Take 1 capsule by mouth every morning for 30 days. 30 capsule 0    traZODone (DESYREL) 50 MG tablet Take 1 tablet by mouth nightly 30 tablet 5    venlafaxine (EFFEXOR XR) 150 MG extended release capsule Take 1 capsule by mouth daily 90 capsule 3    ARIPiprazole (ABILIFY) 15 MG tablet Take 1 tablet by mouth at bedtime 90 tablet 3    busPIRone (BUSPAR) 30 MG tablet Take 1 tablet by mouth twice a day 60 tablet 5    lamoTRIgine (LAMICTAL) 100 MG tablet Take 1 tablet by mouth twice daily 60 tablet 3    dicyclomine (BENTYL) 10 MG capsule Take by mouth      vitamin D (ERGOCALCIFEROL) 28244 units CAPS capsule Take 1 capsule by mouth once a week 12 capsule 1    Levonorgestrel (MIRENA, 52 MG, IU) by Intrauterine route       No current facility-administered medications on file prior to visit.       Past Surgical History:   Procedure Laterality Date    LEEP          Family History   Problem Relation Age of Onset    Depression Mother     Lupus Mother     Heart Attack Mother      Social History     Socioeconomic History    Marital status:      Spouse name: Not on file    Number of children: Not on file    Years of education: Not on file    Highest education level: Not on file   Occupational History    Not on file   Social Needs    Financial resource strain: Not on file    Food insecurity     Worry: Not on file     Inability: Not on file    Transportation needs     Medical: Not on file     Non-medical: Not on file   Tobacco Use    Smoking status: Never Smoker    Smokeless tobacco: Never Used   Substance and Sexual Activity    Alcohol use: No    Drug use: No    Sexual activity: Yes     Partners: Male   Lifestyle    Physical activity     Days per week: Not on file     Minutes per session: Not on file    Stress: Not on file   Relationships    Social connections     Talks on phone: Not on file     Gets together: Not on file     Attends Mormonism service: Not on file     Active member of club or organization: Not on file     Attends meetings of clubs or organizations: Not on file     Relationship status: Not on file    Intimate partner violence     Fear of current or ex partner: Not on file     Emotionally abused: Not on file     Physically abused: Not on file     Forced sexual activity: Not on file   Other Topics Concern    Not on file   Social History Narrative    Not on file     Allergies:  Patient has no known allergies. Review of Systems   Constitutional: Negative for appetite change, chills, diaphoresis, fatigue, fever and unexpected weight change. HENT: Negative. Negative for congestion. Eyes: Negative. Respiratory: Negative. Negative for cough, shortness of breath and wheezing. Cardiovascular: Negative. Negative for chest pain and palpitations. Gastrointestinal: Negative. Negative for nausea and vomiting. Genitourinary: Negative. Musculoskeletal: Negative. Negative for arthralgias and myalgias. Skin: Negative. Neurological: Negative for dizziness, focal weakness, syncope and light-headedness. Psychiatric/Behavioral: Positive for decreased concentration (in afternoon around lunch time). Negative for agitation, behavioral problems, confusion, self-injury, sleep disturbance and suicidal ideas. The patient is not nervous/anxious. Objective: There were no vitals taken for this visit. Physical Exam  Constitutional:       Appearance: Normal appearance. HENT:      Head: Atraumatic. Pulmonary:      Effort: No respiratory distress. Neurological:      General: No focal deficit present. Mental Status: She is alert and oriented to person, place, and time. Psychiatric:         Attention and Perception: Attention normal.         Mood and Affect: Mood normal.         Speech: Speech normal.         Behavior: Behavior normal.         Thought Content: Thought content normal.         Assessment & Plan:       Diagnosis Orders   1. Attention deficit hyperactivity disorder (ADHD), predominantly inattentive type  amphetamine-dextroamphetamine (ADDERALL, 5MG,) 5 MG tablet   2. JOHANNA (generalized anxiety disorder)  CBC Auto Differential   3. Mixed hyperlipidemia  CBC Auto Differential    Comprehensive Metabolic Panel    Lipid Panel   4. Vitamin D deficiency  Vitamin D 25 Hydroxy     Orders Placed This Encounter   Procedures    CBC Auto Differential     Standing Status:   Future     Standing Expiration Date:   6/29/2021    Comprehensive Metabolic Panel     Standing Status:   Future     Standing Expiration Date:   6/29/2021    Lipid Panel     Standing Status:   Future     Standing Expiration Date:   6/29/2021     Order Specific Question:   Is Patient Fasting?/# of Hours     Answer:   8    Vitamin D 25 Hydroxy     Standing Status:   Future     Standing Expiration Date:   6/29/2021     Orders Placed This Encounter   Medications    amphetamine-dextroamphetamine (ADDERALL, 5MG,) 5 MG tablet     Sig: Take 1 tablet by mouth Daily with lunch for 30 days. Dispense:  30 tablet     Refill:  0     There are no discontinued medications. Return in about 3 months (around 9/29/2020).       Reviewed with the patient: currentclinical status, medications, activities and diet. Side effects, adverse effects of the medicationprescribed today, as well as treatment plan/ rationale and result expectations havebeen discussed with the patient who expresses understanding and desires to proceed. Pt instructions reviewed and given to patient.     Close follow up to evaluate treatment resultsand for coordination of care. I have reviewed the patient's medical historyin detail and updated the computerized patient record.     Leandra Avila, APRN - CNP

## 2020-07-02 ENCOUNTER — OFFICE VISIT (OUTPATIENT)
Dept: PRIMARY CARE CLINIC | Age: 35
End: 2020-07-02
Payer: COMMERCIAL

## 2020-07-02 VITALS — BODY MASS INDEX: 47.97 KG/M2 | HEIGHT: 64 IN | WEIGHT: 281 LBS | TEMPERATURE: 98.1 F | RESPIRATION RATE: 15 BRPM

## 2020-07-02 PROCEDURE — 1036F TOBACCO NON-USER: CPT | Performed by: PHYSICIAN ASSISTANT

## 2020-07-02 PROCEDURE — 99213 OFFICE O/P EST LOW 20 MIN: CPT | Performed by: PHYSICIAN ASSISTANT

## 2020-07-02 PROCEDURE — G8417 CALC BMI ABV UP PARAM F/U: HCPCS | Performed by: PHYSICIAN ASSISTANT

## 2020-07-02 PROCEDURE — G8428 CUR MEDS NOT DOCUMENT: HCPCS | Performed by: PHYSICIAN ASSISTANT

## 2020-07-02 RX ORDER — SULFAMETHOXAZOLE AND TRIMETHOPRIM 800; 160 MG/1; MG/1
1 TABLET ORAL 2 TIMES DAILY
Qty: 20 TABLET | Refills: 0 | Status: SHIPPED | OUTPATIENT
Start: 2020-07-02 | End: 2020-07-12

## 2020-07-02 NOTE — PROGRESS NOTES
Subjective     Graham Laws 36 y.o. female presents 7/2/20 with   Chief Complaint   Patient presents with    Other     Abscess       HPI   Pt c/o painful red lump under left breast x 3 days, gradually worsening. Denies wound or prior occurrences. The lump is getting larger and more painful, described as \"sharp\" 7/10 with movement or palpation. Denies fever, chills, or drainage. No treatment so far. Reviewed thefollowing history:    Past Medical History:   Diagnosis Date    Abnormal Pap smear of cervix     JOHANNA (generalized anxiety disorder)     Hyperlipidemia     Major depression     Sleep disorder      Past Surgical History:   Procedure Laterality Date    LEEP       Family History   Problem Relation Age of Onset    Depression Mother     Lupus Mother     Heart Attack Mother        No Known Allergies    Current Outpatient Medications   Medication Sig Dispense Refill    sulfamethoxazole-trimethoprim (BACTRIM DS) 800-160 MG per tablet Take 1 tablet by mouth 2 times daily for 10 days 20 tablet 0    amphetamine-dextroamphetamine (ADDERALL, 5MG,) 5 MG tablet Take 1 tablet by mouth Daily with lunch for 30 days. 30 tablet 0    amphetamine-dextroamphetamine (ADDERALL XR) 30 MG extended release capsule Take 1 capsule by mouth every morning for 30 days.  30 capsule 0    traZODone (DESYREL) 50 MG tablet Take 1 tablet by mouth nightly 30 tablet 5    venlafaxine (EFFEXOR XR) 150 MG extended release capsule Take 1 capsule by mouth daily 90 capsule 3    ARIPiprazole (ABILIFY) 15 MG tablet Take 1 tablet by mouth at bedtime 90 tablet 3    busPIRone (BUSPAR) 30 MG tablet Take 1 tablet by mouth twice a day 60 tablet 5    lamoTRIgine (LAMICTAL) 100 MG tablet Take 1 tablet by mouth twice daily 60 tablet 3    dicyclomine (BENTYL) 10 MG capsule Take by mouth      vitamin D (ERGOCALCIFEROL) 56252 units CAPS capsule Take 1 capsule by mouth once a week 12 capsule 1    Levonorgestrel (MIRENA, 52 MG, IU) by Intrauterine route       No current facility-administered medications for this visit. Review of Systems   Constitutional: Negative for chills, fatigue and fever. Gastrointestinal: Negative for nausea. Skin: Positive for color change. Objective    Vitals:    07/02/20 1835   Resp: 15   Temp: 98.1 °F (36.7 °C)   Weight: 281 lb (127.5 kg)   Height: 5' 4\" (1.626 m)       Physical Exam  Vitals signs reviewed. Constitutional:       Appearance: Normal appearance. She is obese. Cardiovascular:      Rate and Rhythm: Normal rate and regular rhythm. Pulmonary:      Effort: Pulmonary effort is normal.      Breath sounds: Normal breath sounds. Abdominal:       Neurological:      Mental Status: She is alert. Assessment and Plan      ICD-10-CM    1. Abscess L02.91        No orders of the defined types were placed in this encounter. Orders Placed This Encounter   Medications    sulfamethoxazole-trimethoprim (BACTRIM DS) 800-160 MG per tablet     Sig: Take 1 tablet by mouth 2 times daily for 10 days     Dispense:  20 tablet     Refill:  0     Warm compresses. Reviewed with the patient: current clinicalstatus, medications, activities and diet. Side effects, adverse effects of the medication prescribed today, as well as treatment plan and result expectations have been discussed with the patient who expressesunderstanding and desires to proceed. Close follow up to evaluate treatment results and for coordination of care. I have reviewed the patient's medical history in detail and updated the computerized patientrecord. Return if symptoms worsen or fail to improve, for follow up with PCP.     YUMIKO Melendez

## 2020-07-03 ASSESSMENT — ENCOUNTER SYMPTOMS
COLOR CHANGE: 1
NAUSEA: 0

## 2020-07-06 ENCOUNTER — PATIENT MESSAGE (OUTPATIENT)
Dept: PRIMARY CARE CLINIC | Age: 35
End: 2020-07-06

## 2020-07-07 NOTE — TELEPHONE ENCOUNTER
Hi Elizabeth,  The abcess burst the other night with clear fluid. What a relief!! lol  Do I need to still complete the antibiotic?   Thank you for your time,  Rohith Marquez

## 2020-07-08 ASSESSMENT — ENCOUNTER SYMPTOMS
EYES NEGATIVE: 1
GASTROINTESTINAL NEGATIVE: 1
RESPIRATORY NEGATIVE: 1

## 2020-07-20 RX ORDER — DEXTROAMPHETAMINE SACCHARATE, AMPHETAMINE ASPARTATE MONOHYDRATE, DEXTROAMPHETAMINE SULFATE AND AMPHETAMINE SULFATE 7.5; 7.5; 7.5; 7.5 MG/1; MG/1; MG/1; MG/1
30 CAPSULE, EXTENDED RELEASE ORAL EVERY MORNING
Qty: 30 CAPSULE | Refills: 0 | Status: SHIPPED | OUTPATIENT
Start: 2020-07-20 | End: 2020-08-19 | Stop reason: SDUPTHER

## 2020-08-19 RX ORDER — BUSPIRONE HYDROCHLORIDE 30 MG/1
TABLET ORAL
Qty: 60 TABLET | Refills: 5 | Status: SHIPPED | OUTPATIENT
Start: 2020-08-19

## 2020-08-19 RX ORDER — DEXTROAMPHETAMINE SACCHARATE, AMPHETAMINE ASPARTATE MONOHYDRATE, DEXTROAMPHETAMINE SULFATE AND AMPHETAMINE SULFATE 7.5; 7.5; 7.5; 7.5 MG/1; MG/1; MG/1; MG/1
30 CAPSULE, EXTENDED RELEASE ORAL EVERY MORNING
Qty: 30 CAPSULE | Refills: 0 | Status: SHIPPED | OUTPATIENT
Start: 2020-08-19 | End: 2021-08-20

## 2020-09-10 RX ORDER — TRAZODONE HYDROCHLORIDE 50 MG/1
50 TABLET ORAL NIGHTLY
Qty: 30 TABLET | Refills: 5 | Status: SHIPPED | OUTPATIENT
Start: 2020-09-10 | End: 2021-03-17

## 2021-03-10 ENCOUNTER — OFFICE VISIT (OUTPATIENT)
Dept: FAMILY MEDICINE CLINIC | Age: 36
End: 2021-03-10
Payer: COMMERCIAL

## 2021-03-10 VITALS
BODY MASS INDEX: 48.14 KG/M2 | SYSTOLIC BLOOD PRESSURE: 126 MMHG | WEIGHT: 282 LBS | DIASTOLIC BLOOD PRESSURE: 88 MMHG | HEART RATE: 98 BPM | RESPIRATION RATE: 16 BRPM | TEMPERATURE: 97.3 F | OXYGEN SATURATION: 98 % | HEIGHT: 64 IN

## 2021-03-10 DIAGNOSIS — Z00.00 HEALTHCARE MAINTENANCE: Primary | ICD-10-CM

## 2021-03-10 PROBLEM — R41.89 IMPAIRED COGNITION: Status: ACTIVE | Noted: 2021-03-10

## 2021-03-10 PROBLEM — F31.81 BIPOLAR II DISORDER (HCC): Status: ACTIVE | Noted: 2021-03-10

## 2021-03-10 PROBLEM — F90.2 ATTENTION DEFICIT HYPERACTIVITY DISORDER, COMBINED TYPE: Status: ACTIVE | Noted: 2021-03-10

## 2021-03-10 PROBLEM — F41.9 ANXIETY: Status: ACTIVE | Noted: 2021-03-10

## 2021-03-10 PROCEDURE — 99395 PREV VISIT EST AGE 18-39: CPT | Performed by: NURSE PRACTITIONER

## 2021-03-10 PROCEDURE — G8484 FLU IMMUNIZE NO ADMIN: HCPCS | Performed by: NURSE PRACTITIONER

## 2021-03-10 SDOH — ECONOMIC STABILITY: INCOME INSECURITY: HOW HARD IS IT FOR YOU TO PAY FOR THE VERY BASICS LIKE FOOD, HOUSING, MEDICAL CARE, AND HEATING?: NOT HARD AT ALL

## 2021-03-10 SDOH — ECONOMIC STABILITY: TRANSPORTATION INSECURITY
IN THE PAST 12 MONTHS, HAS THE LACK OF TRANSPORTATION KEPT YOU FROM MEDICAL APPOINTMENTS OR FROM GETTING MEDICATIONS?: NO

## 2021-03-10 SDOH — ECONOMIC STABILITY: TRANSPORTATION INSECURITY
IN THE PAST 12 MONTHS, HAS LACK OF TRANSPORTATION KEPT YOU FROM MEETINGS, WORK, OR FROM GETTING THINGS NEEDED FOR DAILY LIVING?: NO

## 2021-03-10 NOTE — PROGRESS NOTES
Subjective:     Patient ID: Caitlyn Carrington is a 30 y.o. female who presentstoday for:  Chief Complaint   Patient presents with    Annual Exam     Pt. is here for an annual physical for her employer. She has open labs from previous (printed out). HPI  Patient here for annual exam denies any concerns. Past Medical History:   Diagnosis Date    Abnormal Pap smear of cervix     JOHANNA (generalized anxiety disorder)     Hyperlipidemia     Major depression     Sleep disorder      Current Outpatient Medications on File Prior to Visit   Medication Sig Dispense Refill    busPIRone (BUSPAR) 30 MG tablet Take 1 tablet by mouth twice a day 60 tablet 5    amphetamine-dextroamphetamine (ADDERALL XR) 30 MG extended release capsule Take 1 capsule by mouth every morning for 30 days. 30 capsule 0    amphetamine-dextroamphetamine (ADDERALL, 5MG,) 5 MG tablet Take 1 tablet by mouth Daily with lunch for 30 days. 30 tablet 0    venlafaxine (EFFEXOR XR) 150 MG extended release capsule Take 1 capsule by mouth daily 90 capsule 3    ARIPiprazole (ABILIFY) 15 MG tablet Take 1 tablet by mouth at bedtime 90 tablet 3    lamoTRIgine (LAMICTAL) 100 MG tablet Take 1 tablet by mouth twice daily 60 tablet 3    dicyclomine (BENTYL) 10 MG capsule Take by mouth      vitamin D (ERGOCALCIFEROL) 44645 units CAPS capsule Take 1 capsule by mouth once a week 12 capsule 1    Levonorgestrel (MIRENA, 52 MG, IU) by Intrauterine route       No current facility-administered medications on file prior to visit.       Past Surgical History:   Procedure Laterality Date    LEEP          Family History   Problem Relation Age of Onset    Depression Mother     Lupus Mother     Heart Attack Mother      Social History     Socioeconomic History    Marital status:      Spouse name: Not on file    Number of children: Not on file    Years of education: Not on file    Highest education level: Not on file   Occupational History    Not on file   Social Needs    Financial resource strain: Not hard at all   [employer] insecurity     Worry: Never true     Inability: Never true   [employer] needs     Medical: No     Non-medical: No   Tobacco Use    Smoking status: Never Smoker    Smokeless tobacco: Never Used   Substance and Sexual Activity    Alcohol use: No    Drug use: No    Sexual activity: Yes     Partners: Male   Lifestyle    Physical activity     Days per week: Not on file     Minutes per session: Not on file    Stress: Not on file   Relationships    Social connections     Talks on phone: Not on file     Gets together: Not on file     Attends Scientologist service: Not on file     Active member of club or organization: Not on file     Attends meetings of clubs or organizations: Not on file     Relationship status: Not on file    Intimate partner violence     Fear of current or ex partner: Not on file     Emotionally abused: Not on file     Physically abused: Not on file     Forced sexual activity: Not on file   Other Topics Concern    Not on file   Social History Narrative    Not on file     Allergies:  Patient has no known allergies. Review of Systems   Constitutional: Negative for appetite change, chills, diaphoresis, fatigue, fever and unexpected weight change. HENT: Negative. Negative for congestion. Eyes: Negative. Respiratory: Negative. Negative for cough, shortness of breath and wheezing. Cardiovascular: Negative. Negative for chest pain and palpitations. Gastrointestinal: Negative. Negative for nausea and vomiting. Genitourinary: Negative. Musculoskeletal: Negative. Negative for arthralgias and myalgias. Skin: Negative. Neurological: Negative for dizziness, syncope and light-headedness. Psychiatric/Behavioral: Negative.         Objective:    /88 (Site: Right Upper Arm, Position: Sitting, Cuff Size: Large Adult)   Pulse 98   Temp 97.3 °F (36.3 °C) (Temporal)   Resp 16   Ht 5' 4\" (1.626 m) Wt 282 lb (127.9 kg)   LMP 02/24/2021 (Approximate)   SpO2 98%   Breastfeeding No   BMI 48.41 kg/m²     Physical Exam  Constitutional:       General: She is not in acute distress. Appearance: She is well-developed. She is not diaphoretic. HENT:      Head: Normocephalic and atraumatic. Right Ear: External ear normal.      Left Ear: External ear normal.      Nose: Nose normal.   Eyes:      Conjunctiva/sclera: Conjunctivae normal.      Pupils: Pupils are equal, round, and reactive to light. Neck:      Musculoskeletal: Normal range of motion and neck supple. Thyroid: No thyromegaly. Cardiovascular:      Rate and Rhythm: Normal rate and regular rhythm. Heart sounds: Normal heart sounds. Pulmonary:      Effort: Pulmonary effort is normal. No respiratory distress. Breath sounds: Normal breath sounds. No wheezing. Abdominal:      General: Bowel sounds are normal. There is no distension. Palpations: Abdomen is soft. Tenderness: There is no abdominal tenderness. Musculoskeletal: Normal range of motion. General: No tenderness. Lymphadenopathy:      Cervical: No cervical adenopathy. Skin:     General: Skin is warm. Neurological:      Mental Status: She is alert and oriented to person, place, and time. Cranial Nerves: No cranial nerve deficit. Coordination: Coordination normal.   Psychiatric:         Behavior: Behavior normal.         Thought Content: Thought content normal.         Assessment & Plan:       Diagnosis Orders   1.  Healthcare maintenance  Comprehensive Metabolic Panel    Lipid Panel    TSH Without Reflex     Orders Placed This Encounter   Procedures    Comprehensive Metabolic Panel     Standing Status:   Future     Standing Expiration Date:   3/10/2022    Lipid Panel     Standing Status:   Future     Standing Expiration Date:   3/10/2022     Order Specific Question:   Is Patient Fasting?/# of Hours     Answer:   8    TSH Without Reflex Standing Status:   Future     Standing Expiration Date:   3/10/2022     No orders of the defined types were placed in this encounter. There are no discontinued medications. Return in about 1 year (around 3/10/2022). Reviewed with the patient: currentclinical status, medications, activities and diet. Side effects, adverse effects of the medicationprescribed today, as well as treatment plan/ rationale and result expectations havebeen discussed with the patient who expresses understanding and desires to proceed. Pt instructions reviewed and given to patient.     Close follow up to evaluate treatment resultsand for coordination of care. I have reviewed the patient's medical historyin detail and updated the computerized patient record.     Salina Branch, TAYLOR - CNP

## 2021-03-16 DIAGNOSIS — F33.42 RECURRENT MAJOR DEPRESSIVE DISORDER, IN FULL REMISSION (HCC): ICD-10-CM

## 2021-03-17 RX ORDER — TRAZODONE HYDROCHLORIDE 50 MG/1
50 TABLET ORAL NIGHTLY
Qty: 30 TABLET | Refills: 5 | Status: SHIPPED | OUTPATIENT
Start: 2021-03-17

## 2021-03-22 ASSESSMENT — ENCOUNTER SYMPTOMS
RESPIRATORY NEGATIVE: 1
NAUSEA: 0
VOMITING: 0
COUGH: 0
GASTROINTESTINAL NEGATIVE: 1
EYES NEGATIVE: 1
WHEEZING: 0
SHORTNESS OF BREATH: 0

## 2021-08-16 ENCOUNTER — PATIENT MESSAGE (OUTPATIENT)
Dept: FAMILY MEDICINE CLINIC | Age: 36
End: 2021-08-16

## 2021-08-16 DIAGNOSIS — E78.2 MIXED HYPERLIPIDEMIA: Primary | ICD-10-CM

## 2021-08-16 DIAGNOSIS — M25.50 MULTIPLE JOINT PAIN: ICD-10-CM

## 2021-08-16 DIAGNOSIS — F41.1 GAD (GENERALIZED ANXIETY DISORDER): ICD-10-CM

## 2021-08-20 ENCOUNTER — VIRTUAL VISIT (OUTPATIENT)
Dept: FAMILY MEDICINE CLINIC | Age: 36
End: 2021-08-20
Payer: COMMERCIAL

## 2021-08-20 DIAGNOSIS — R76.8 POSITIVE ANA (ANTINUCLEAR ANTIBODY): Primary | ICD-10-CM

## 2021-08-20 DIAGNOSIS — M25.50 MULTIPLE JOINT PAIN: ICD-10-CM

## 2021-08-20 PROCEDURE — 99213 OFFICE O/P EST LOW 20 MIN: CPT | Performed by: NURSE PRACTITIONER

## 2021-08-20 PROCEDURE — G8428 CUR MEDS NOT DOCUMENT: HCPCS | Performed by: NURSE PRACTITIONER

## 2021-08-20 RX ORDER — VENLAFAXINE HYDROCHLORIDE 75 MG/1
CAPSULE, EXTENDED RELEASE ORAL
COMMUNITY
Start: 2021-08-14

## 2021-08-20 RX ORDER — LISDEXAMFETAMINE DIMESYLATE 30 MG/1
CAPSULE ORAL
COMMUNITY
Start: 2021-08-06 | End: 2022-02-22 | Stop reason: ALTCHOICE

## 2021-08-20 NOTE — PROGRESS NOTES
Subjective:     Patient ID: Juan Gordillo is a 29 y.o. female who presentstoday for:  Chief Complaint   Patient presents with    Joint Pain         TELEHEALTH EVALUATION -- Audio/Visual (During ZAGPE-80 public health emergency)    -   Juan Gordillo is a 29 y.o. female being evaluated by a Virtual Visit (video visit) encounter to address concerns as mentioned above. A caregiver was present when appropriate. Due to this being a TeleHealth encounter (During LCOYG-85 public health emergency), evaluation of the following organ systems was limited: Vitals/Constitutional/EENT/Resp/CV/GI//MS/Neuro/Skin/Heme-Lymph-Imm. Pursuant to the emergency declaration under the [address] [place] [address] and the [employer] Act, this Virtual Visit was conducted with patient's (and/or legal guardian's) consent, to reduce the patient's risk of exposure to COVID-19 and provide necessary medical care. The patient (and/or legal guardian) has also been advised to contact this office for worsening conditions or problems, and seek emergency medical treatment and/or call 911 if deemed necessary. Services were provided through a video synchronous discussion virtually to substitute for in-person clinic visit. Type of encounter was _x_ Doxy __ MyChart ___Facetime    Patient was located at their home. Provider was located at their ___ home or        __x__ office. --TAYLOR Fuentes - CNP on 8/24/2021 at 9:27 AM    An electronic signature was used to authenticate this note. HPI  C/o multiple joint pain for several months. Lab work essentially normal with positive CHELSIE.       Past Medical History:   Diagnosis Date    Abnormal Pap smear of cervix     JOHANNA (generalized anxiety disorder)     Hyperlipidemia     Major depression     Sleep disorder      Current Outpatient Medications on File Prior to Visit   Medication Sig Dispense Refill    venlafaxine (EFFEXOR XR) 75 MG extended release capsule TAKE 3 CAPSULES BY MOUTH EVERY MORNING with a meal      VYVANSE 30 MG capsule TAKE 1 CAPSULE BY MOUTH EVERY MORNING      traZODone (DESYREL) 50 MG tablet Take 1 tablet by mouth nightly 30 tablet 5    busPIRone (BUSPAR) 30 MG tablet Take 1 tablet by mouth twice a day 60 tablet 5    venlafaxine (EFFEXOR XR) 150 MG extended release capsule Take 1 capsule by mouth daily 90 capsule 3    lamoTRIgine (LAMICTAL) 100 MG tablet Take 1 tablet by mouth twice daily 60 tablet 3    dicyclomine (BENTYL) 10 MG capsule Take by mouth      vitamin D (ERGOCALCIFEROL) 14528 units CAPS capsule Take 1 capsule by mouth once a week 12 capsule 1    Levonorgestrel (MIRENA, 52 MG, IU) by Intrauterine route       No current facility-administered medications on file prior to visit. Past Surgical History:   Procedure Laterality Date    LEEP          Family History   Problem Relation Age of Onset    Depression Mother     Lupus Mother     Heart Attack Mother      Social History     Socioeconomic History    Marital status:      Spouse name: Not on file    Number of children: Not on file    Years of education: Not on file    Highest education level: Not on file   Occupational History    Not on file   Tobacco Use    Smoking status: Never Smoker    Smokeless tobacco: Never Used   Substance and Sexual Activity    Alcohol use: No    Drug use: No    Sexual activity: Yes     Partners: Male   Other Topics Concern    Not on file   Social History Narrative    Not on file     Social Determinants of Health     Financial Resource Strain: Low Risk     Difficulty of Paying Living Expenses: Not hard at all   Food Insecurity: No Food Insecurity    Worried About [employer] [employer] in the Last Year: Never true    Aleksandar of Food in the Last Year: Never true   Transportation Needs: No Transportation Needs    Lack of Transportation (Medical):  No    Lack of MG tablet LIST CLEANUP    amphetamine-dextroamphetamine (ADDERALL XR) 30 MG extended release capsule LIST CLEANUP     No follow-ups on file. Reviewed with the patient: currentclinical status, medications, activities and diet. Side effects, adverse effects of the medicationprescribed today, as well as treatment plan/ rationale and result expectations havebeen discussed with the patient who expresses understanding and desires to proceed. Pt instructions reviewed and given to patient.     Close follow up to evaluate treatment resultsand for coordination of care. I have reviewed the patient's medical historyin detail and updated the computerized patient record.     Toni Lujan, APRN - CNP

## 2021-08-24 ASSESSMENT — ENCOUNTER SYMPTOMS: COLOR CHANGE: 0

## 2022-02-22 ENCOUNTER — TELEMEDICINE (OUTPATIENT)
Dept: FAMILY MEDICINE CLINIC | Age: 37
End: 2022-02-22
Payer: COMMERCIAL

## 2022-02-22 DIAGNOSIS — M62.830 MUSCLE SPASM OF BACK: Primary | ICD-10-CM

## 2022-02-22 PROBLEM — F44.9 DISSOCIATIVE DISORDER: Status: ACTIVE | Noted: 2022-02-22

## 2022-02-22 PROCEDURE — 99213 OFFICE O/P EST LOW 20 MIN: CPT | Performed by: NURSE PRACTITIONER

## 2022-02-22 RX ORDER — NARATRIPTAN 2.5 MG/1
2.5 TABLET ORAL 2 TIMES DAILY
COMMUNITY
Start: 2022-02-21 | End: 2022-07-01

## 2022-02-22 RX ORDER — TOPIRAMATE 25 MG/1
TABLET ORAL
COMMUNITY
Start: 2022-02-21

## 2022-02-22 RX ORDER — DEXTROAMPHETAMINE SACCHARATE, AMPHETAMINE ASPARTATE, DEXTROAMPHETAMINE SULFATE AND AMPHETAMINE SULFATE 7.5; 7.5; 7.5; 7.5 MG/1; MG/1; MG/1; MG/1
TABLET ORAL
COMMUNITY

## 2022-02-22 RX ORDER — CYCLOBENZAPRINE HCL 10 MG
10 TABLET ORAL 3 TIMES DAILY PRN
Qty: 30 TABLET | Refills: 2 | Status: SHIPPED | OUTPATIENT
Start: 2022-02-22 | End: 2022-03-04

## 2022-02-22 RX ORDER — DEXTROAMPHETAMINE SACCHARATE, AMPHETAMINE ASPARTATE, DEXTROAMPHETAMINE SULFATE AND AMPHETAMINE SULFATE 2.5; 2.5; 2.5; 2.5 MG/1; MG/1; MG/1; MG/1
TABLET ORAL
COMMUNITY

## 2022-02-22 ASSESSMENT — COLUMBIA-SUICIDE SEVERITY RATING SCALE - C-SSRS
6. HAVE YOU EVER DONE ANYTHING, STARTED TO DO ANYTHING, OR PREPARED TO DO ANYTHING TO END YOUR LIFE?: NO
2. HAVE YOU ACTUALLY HAD ANY THOUGHTS OF KILLING YOURSELF?: NO
1. WITHIN THE PAST MONTH, HAVE YOU WISHED YOU WERE DEAD OR WISHED YOU COULD GO TO SLEEP AND NOT WAKE UP?: NO

## 2022-02-22 ASSESSMENT — PATIENT HEALTH QUESTIONNAIRE - PHQ9
SUM OF ALL RESPONSES TO PHQ QUESTIONS 1-9: 3
4. FEELING TIRED OR HAVING LITTLE ENERGY: 1
3. TROUBLE FALLING OR STAYING ASLEEP: 0
SUM OF ALL RESPONSES TO PHQ9 QUESTIONS 1 & 2: 2
SUM OF ALL RESPONSES TO PHQ QUESTIONS 1-9: 3
SUM OF ALL RESPONSES TO PHQ QUESTIONS 1-9: 3
2. FEELING DOWN, DEPRESSED OR HOPELESS: 1
SUM OF ALL RESPONSES TO PHQ QUESTIONS 1-9: 3
6. FEELING BAD ABOUT YOURSELF - OR THAT YOU ARE A FAILURE OR HAVE LET YOURSELF OR YOUR FAMILY DOWN: 0
5. POOR APPETITE OR OVEREATING: 0
9. THOUGHTS THAT YOU WOULD BE BETTER OFF DEAD, OR OF HURTING YOURSELF: 0
8. MOVING OR SPEAKING SO SLOWLY THAT OTHER PEOPLE COULD HAVE NOTICED. OR THE OPPOSITE, BEING SO FIGETY OR RESTLESS THAT YOU HAVE BEEN MOVING AROUND A LOT MORE THAN USUAL: 0
1. LITTLE INTEREST OR PLEASURE IN DOING THINGS: 1
10. IF YOU CHECKED OFF ANY PROBLEMS, HOW DIFFICULT HAVE THESE PROBLEMS MADE IT FOR YOU TO DO YOUR WORK, TAKE CARE OF THINGS AT HOME, OR GET ALONG WITH OTHER PEOPLE: 0
7. TROUBLE CONCENTRATING ON THINGS, SUCH AS READING THE NEWSPAPER OR WATCHING TELEVISION: 0

## 2022-02-22 ASSESSMENT — ENCOUNTER SYMPTOMS
COLOR CHANGE: 0
BACK PAIN: 1

## 2022-02-22 NOTE — PROGRESS NOTES
Subjective:     Patient ID: Rahul Tong is a 29 y.o. female who presentstoday for:  Chief Complaint   Patient presents with    Headache         TELEHEALTH EVALUATION -- Audio/Visual ([facility])    -   Rahul Tong is a 29 y.o. female being evaluated by a Virtual Visit (video visit) encounter to address concerns as mentioned above. A caregiver was present when appropriate. Due to this being a TeleHealth encounter (During [facility] emergency), evaluation of the following organ systems was limited: Vitals/Constitutional/EENT/Resp/CV/GI//MS/Neuro/Skin/Heme-Lymph-Imm. Pursuant to the emergency declaration under the [address] [place] [address] authority and the [employer] Act, this Virtual Visit was conducted with patient's (and/or legal guardian's) consent, to reduce the patient's risk of exposure to COVID-19 and provide necessary medical care. The patient (and/or legal guardian) has also been advised to contact this office for worsening conditions or problems, and seek emergency medical treatment and/or call 911 if deemed necessary. Services were provided through a video synchronous discussion virtually to substitute for in-person clinic visit. Type of encounter was _x_ Doxy __ MyChart ___Facetime    Patient was located at their home. Provider was located at their ___ home or        _x___ office. --TAYLOR Hanson - CNP on 2/23/2022 at 7:50 AM    An electronic signature was used to authenticate this note. Headache   This is a recurrent problem. The current episode started more than 1 month ago. The problem occurs intermittently. The problem has been waxing and waning. The pain is located in the occipital region. The pain radiates to the upper back, left neck and right neck. The quality of the pain is described as aching.  Associated symptoms include back pain and neck pain. Pertinent negatives include no dizziness or weakness. The symptoms are aggravated by emotional stress. She has tried acetaminophen, cold packs and NSAIDs for the symptoms. The treatment provided mild relief. Past Medical History:   Diagnosis Date    Abnormal Pap smear of cervix     JOHANNA (generalized anxiety disorder)     Hyperlipidemia     Major depression     Sleep disorder      Current Outpatient Medications on File Prior to Visit   Medication Sig Dispense Refill    naratriptan (AMERGE) 2.5 MG tablet Take 2.5 mg by mouth 2 times daily      topiramate (TOPAMAX) 25 MG tablet Take 25 mg (1 tablet) at bedtime for one week, then increase to 50 mg at bedtime      amphetamine-dextroamphetamine (ADDERALL, 10MG,) 10 MG tablet       amphetamine-dextroamphetamine (ADDERALL, 30MG,) 30 MG tablet       venlafaxine (EFFEXOR XR) 75 MG extended release capsule TAKE 3 CAPSULES BY MOUTH EVERY MORNING with a meal      traZODone (DESYREL) 50 MG tablet Take 1 tablet by mouth nightly 30 tablet 5    busPIRone (BUSPAR) 30 MG tablet Take 1 tablet by mouth twice a day 60 tablet 5    Levonorgestrel (MIRENA, 52 MG, IU) by Intrauterine route       No current facility-administered medications on file prior to visit.      Past Surgical History:   Procedure Laterality Date    LEEP          Family History   Problem Relation Age of Onset    Depression Mother     Lupus Mother     Heart Attack Mother      Social History     Socioeconomic History    Marital status:      Spouse name: Not on file    Number of children: Not on file    Years of education: Not on file    Highest education level: Not on file   Occupational History    Not on file   Tobacco Use    Smoking status: Never Smoker    Smokeless tobacco: Never Used   Substance and Sexual Activity    Alcohol use: No    Drug use: No    Sexual activity: Yes     Partners: Male   Other Topics Concern    Not on file   Social History Narrative    Not on file     Social Determinants of Health     Financial Resource Strain: Low Risk     Difficulty of Paying Living Expenses: Not hard at all   Food Insecurity: No Food Insecurity    Worried About Running Out of Food in the Last Year: Never true    Aleks[employer]ar of Food in the Last Year: Never true   Transportation Needs: No Transportation Needs    Lack of Transportation (Medical): No    Lack of Transportation (Non-Medical): No   Physical Activity:     Days of Exercise per Week: Not on file    Minutes of Exercise per Session: Not on file   Stress:     Feeling of Stress : Not on file   Social Connections:     Frequency of Communication with Friends and Family: Not on file    Frequency of Social Gatherings with Friends and Family: Not on file    Attends [employer] Services: Not on file    Active Member of [address] [place] [address] [email or web address] or Organizations: Not on file    Attends Club or Organization Meetings: Not on file    Marital Status: Not on file   Intimate Partner Violence:     Fear of Current or Ex-Partner: Not on file    Emotionally Abused: Not on file    Physically Abused: Not on file    Sexually Abused: Not on file   Housing Stability:     Unable to Pay for Housing in the Last Year: Not on file    Number of [place] in the Last Year: Not on file    Unstable Housing in the Last Year: Not on file     Allergies:  Patient has no known allergies. Review of Systems   Eyes: Negative for visual disturbance. Musculoskeletal: Positive for back pain, myalgias and neck pain. Negative for gait problem. Skin: Negative for color change and wound. Neurological: Positive for headaches. Negative for dizziness, syncope, weakness and light-headedness. Objective: There were no vitals taken for this visit. Physical Exam  Constitutional:       General: She is not in acute distress. Pulmonary:      Effort: Pulmonary effort is normal. No respiratory distress. Neurological:      General: No focal deficit present. Mental Status: She is alert and oriented to person, place, and time. Psychiatric:         Mood and Affect: Mood normal.         Behavior: Behavior normal.         Thought Content: Thought content normal.         Assessment & Plan:       Diagnosis Orders   1. Muscle spasm of back  cyclobenzaprine (FLEXERIL) 10 MG tablet     No orders of the defined types were placed in this encounter. Orders Placed This Encounter   Medications    cyclobenzaprine (FLEXERIL) 10 MG tablet     Sig: Take 1 tablet by mouth 3 times daily as needed for Muscle spasms     Dispense:  30 tablet     Refill:  2     Medications Discontinued During This Encounter   Medication Reason    vitamin D (ERGOCALCIFEROL) 77088 units CAPS capsule Therapy completed    venlafaxine (EFFEXOR XR) 150 MG extended release capsule Therapy completed    lamoTRIgine (LAMICTAL) 100 MG tablet Therapy completed    VYVANSE 30 MG capsule Therapy completed    dicyclomine (BENTYL) 10 MG capsule Therapy completed    cyclobenzaprine (FLEXERIL) 10 MG tablet REORDER     No follow-ups on file. Reviewed with the patient: currentclinical status, medications, activities and diet. Side effects, adverse effects of the medicationprescribed today, as well as treatment plan/ rationale and result expectations havebeen discussed with the patient who expresses understanding and desires to proceed. Pt instructions reviewed and given to patient.     Close follow up to evaluate treatment resultsand for coordination of care. I have reviewed the patient's medical historyin detail and updated the computerized patient record.     Mariana Grossman, APRN - CNP

## 2022-05-16 ENCOUNTER — OFFICE VISIT (OUTPATIENT)
Dept: FAMILY MEDICINE CLINIC | Age: 37
End: 2022-05-16
Payer: COMMERCIAL

## 2022-05-16 DIAGNOSIS — R74.8 ELEVATED LIVER ENZYMES: Primary | ICD-10-CM

## 2022-05-16 PROCEDURE — G8428 CUR MEDS NOT DOCUMENT: HCPCS | Performed by: NURSE PRACTITIONER

## 2022-05-16 PROCEDURE — 1036F TOBACCO NON-USER: CPT | Performed by: NURSE PRACTITIONER

## 2022-05-16 PROCEDURE — 99214 OFFICE O/P EST MOD 30 MIN: CPT | Performed by: NURSE PRACTITIONER

## 2022-05-16 PROCEDURE — G8421 BMI NOT CALCULATED: HCPCS | Performed by: NURSE PRACTITIONER

## 2022-05-16 RX ORDER — LAMOTRIGINE 100 MG/1
TABLET ORAL
COMMUNITY
Start: 2022-04-18

## 2022-05-16 RX ORDER — DULOXETIN HYDROCHLORIDE 30 MG/1
CAPSULE, DELAYED RELEASE ORAL
COMMUNITY
Start: 2022-05-11

## 2022-05-16 ASSESSMENT — ENCOUNTER SYMPTOMS
NAUSEA: 0
ABDOMINAL PAIN: 0
ABDOMINAL DISTENTION: 0
GASTROINTESTINAL NEGATIVE: 1
VOMITING: 0
DIARRHEA: 0

## 2022-05-16 NOTE — PROGRESS NOTES
Subjective:     Patient ID: Mike Zambrano is a 29 y.o. female who presentstoday for:  Chief Complaint   Patient presents with    Discuss Labs         TELEHEALTH EVALUATION -- Audio/Visual (During GRKPF-26 public health emergency)    -   Mike Zambrano is a 29 y.o. female being evaluated by a Virtual Visit (video visit) encounter to address concerns as mentioned above. A caregiver was present when appropriate. Due to this being a TeleHealth encounter (During OGYJA-18 public health emergency), evaluation of the following organ systems was limited: Vitals/Constitutional/EENT/Resp/CV/GI//MS/Neuro/Skin/Heme-Lymph-Imm. Pursuant to the emergency declaration under the [address] [place] [address] authority and the [employer] Act, this Virtual Visit was conducted with patient's (and/or legal guardian's) consent, to reduce the patient's risk of exposure to COVID-19 and provide necessary medical care. The patient (and/or legal guardian) has also been advised to contact this office for worsening conditions or problems, and seek emergency medical treatment and/or call 911 if deemed necessary. Services were provided through a video synchronous discussion virtually to substitute for in-person clinic visit. Type of encounter was _x_ Doxy __ MyChart ___Facetime    Patient was located at their home. Provider was located at their ___ home or        __x__ office. --TAYLOR [place] - CNP on 5/16/2022 at 12:26 PM    An electronic signature was used to authenticate this note.     HPI  Lab work thru [facility] shows mildly elevated liver enzymes  Advised to f/u with PCP      Past Medical History:   Diagnosis Date    Abnormal Pap smear of cervix     JOHANNA (generalized anxiety disorder)     Hyperlipidemia     Major depression     Sleep disorder      Current Outpatient Medications on File Prior to Visit   Medication Sig Dispense Refill  DULoxetine (CYMBALTA) 30 MG extended release capsule Take 30 mg (1 pill) daily for one week, then increase to 60 mg (2 pills) daily      lamoTRIgine (LAMICTAL) 100 MG tablet TAKE 1 TABLET BY MOUTH EVERY MORNING and TAKE 2 TABLETS BY MOUTH AT BEDTIME      naratriptan (AMERGE) 2.5 MG tablet Take 2.5 mg by mouth 2 times daily      topiramate (TOPAMAX) 25 MG tablet Take 25 mg (1 tablet) at bedtime for one week, then increase to 50 mg at bedtime      amphetamine-dextroamphetamine (ADDERALL, 10MG,) 10 MG tablet       amphetamine-dextroamphetamine (ADDERALL, 30MG,) 30 MG tablet       venlafaxine (EFFEXOR XR) 75 MG extended release capsule TAKE 3 CAPSULES BY MOUTH EVERY MORNING with a meal      traZODone (DESYREL) 50 MG tablet Take 1 tablet by mouth nightly 30 tablet 5    busPIRone (BUSPAR) 30 MG tablet Take 1 tablet by mouth twice a day 60 tablet 5    Levonorgestrel (MIRENA, 52 MG, IU) by Intrauterine route       No current facility-administered medications on file prior to visit.      Past Surgical History:   Procedure Laterality Date    LEEP          Family History   Problem Relation Age of Onset    Depression Mother     Lupus Mother     Heart Attack Mother      Social History     Socioeconomic History    Marital status:      Spouse name: Not on file    Number of children: Not on file    Years of education: Not on file    Highest education level: Not on file   Occupational History    Not on file   Tobacco Use    Smoking status: Never Smoker    Smokeless tobacco: Never Used   Substance and Sexual Activity    Alcohol use: No    Drug use: No    Sexual activity: Yes     Partners: Male   Other Topics Concern    Not on file   Social History Narrative    Not on file     Social Determinants of Health     Financial Resource Strain:     Difficulty of Paying Living Expenses: Not on file   Food Insecurity:     Worried About Running Out of Food in the Last Year: Not on file    [address] in the Last Year: Not on file   Transportation Needs:     Lack of Transportation (Medical): Not on file    Lack of Transportation (Non-Medical): Not on file   Physical Activity:     Days of Exercise per Week: Not on file    Minutes of Exercise per Session: Not on file   Stress:     Feeling of Stress : Not on file   Social Connections:     Frequency of Communication with Friends and Family: Not on file    Frequency of Social Gatherings with Friends and Family: Not on file    Attends [employer] Services: Not on file    Active Member of [address] [place] [address] [employer] or Organizations: Not on file    Attends Club or Organization Meetings: Not on file    Marital Status: Not on file   Intimate Partner Violence:     Fear of Current or Ex-Partner: Not on file    Emotionally Abused: Not on file    Physically Abused: Not on file    Sexually Abused: Not on file   Housing Stability:     Unable to Pay for Housing in the Last Year: Not on file    Number of [place] in the Last Year: Not on file    Unstable Housing in the Last Year: Not on file     Allergies:  Sumatriptan and Mirtazapine    Review of Systems   Constitutional: Negative for appetite change and unexpected weight change. Cardiovascular: Negative for chest pain. Gastrointestinal: Negative. Negative for abdominal distention, abdominal pain, diarrhea, nausea and vomiting. Genitourinary: Negative for difficulty urinating. Objective: There were no vitals taken for this visit. Physical Exam  Constitutional:       General: She is not in acute distress. Pulmonary:      Effort: No respiratory distress. Neurological:      Mental Status: She is alert and oriented to person, place, and time. Psychiatric:         Mood and Affect: Mood normal.         Behavior: Behavior normal.         Assessment & Plan:       Diagnosis Orders   1.  Elevated liver enzymes  US ABDOMEN LIMITED    Hepatic Function Panel    Hepatitis Panel, Acute     Orders Placed This Encounter Procedures    US ABDOMEN LIMITED     This procedure can be scheduled via [facility]. Access your [facility] account by visiting [email or web address]. Standing Status:   Future     Standing Expiration Date:   5/16/2023     Order Specific Question:   Specify organ? Answer:   LIVER    Hepatic Function Panel     Standing Status:   Future     Standing Expiration Date:   5/16/2023    Hepatitis Panel, Acute     Standing Status:   Future     Standing Expiration Date:   5/16/2023     No orders of the defined types were placed in this encounter. There are no discontinued medications. Return if symptoms worsen or fail to improve. Reviewed with the patient: currentclinical status, medications, activities and diet. Side effects, adverse effects of the medicationprescribed today, as well as treatment plan/ rationale and result expectations havebeen discussed with the patient who expresses understanding and desires to proceed. Pt instructions reviewed and given to patient.     Close follow up to evaluate treatment resultsand for coordination of care. I have reviewed the patient's medical historyin detail and updated the computerized patient record.     Quan Solis, APRN - CNP

## 2022-06-02 ENCOUNTER — TELEPHONE (OUTPATIENT)
Dept: FAMILY MEDICINE CLINIC | Age: 37
End: 2022-06-02

## 2022-06-02 NOTE — TELEPHONE ENCOUNTER
Pt wanted to know if her [employer] paperwork came in and if so she would like to schedule an appt to go over the paperwork. Please call pt if paperwork is there [phone] . Please advise

## 2022-06-06 NOTE — TELEPHONE ENCOUNTER
[employer] message was sent this morning letting [place] know that we haven't received the paperwork.

## 2022-06-08 ENCOUNTER — HOSPITAL ENCOUNTER (OUTPATIENT)
Dept: ULTRASOUND IMAGING | Age: 37
Discharge: HOME | End: 2022-06-10

## 2022-06-08 ENCOUNTER — OFFICE VISIT (OUTPATIENT)
Dept: FAMILY MEDICINE CLINIC | Age: 37
End: 2022-06-08

## 2022-06-08 DIAGNOSIS — M79.7 FIBROMYALGIA: ICD-10-CM

## 2022-06-08 DIAGNOSIS — R74.8 ELEVATED LIVER ENZYMES: ICD-10-CM

## 2022-06-08 DIAGNOSIS — U09.9 COVID-19 LONG HAULER: Primary | ICD-10-CM

## 2022-06-08 DIAGNOSIS — K80.10 CALCULUS OF GALLBLADDER WITH CHOLECYSTITIS WITHOUT BILIARY OBSTRUCTION, UNSPECIFIED CHOLECYSTITIS ACUITY: ICD-10-CM

## 2022-06-08 DIAGNOSIS — R41.3 MEMORY IMPAIRMENT: ICD-10-CM

## 2022-06-08 PROCEDURE — 76705 ECHO EXAM OF ABDOMEN: CPT

## 2022-06-08 PROCEDURE — 99999 PR OFFICE/OUTPT VISIT,PROCEDURE ONLY: CPT | Performed by: NURSE PRACTITIONER

## 2022-06-08 RX ORDER — CLONIDINE HYDROCHLORIDE 0.1 MG/1
TABLET ORAL
COMMUNITY
Start: 2022-05-25

## 2022-06-08 RX ORDER — DEXTROAMPHETAMINE SACCHARATE, AMPHETAMINE ASPARTATE MONOHYDRATE, DEXTROAMPHETAMINE SULFATE AND AMPHETAMINE SULFATE 7.5; 7.5; 7.5; 7.5 MG/1; MG/1; MG/1; MG/1
CAPSULE, EXTENDED RELEASE ORAL
COMMUNITY
Start: 2022-05-25

## 2022-06-08 RX ORDER — DULOXETIN HYDROCHLORIDE 60 MG/1
CAPSULE, DELAYED RELEASE ORAL
COMMUNITY
Start: 2022-05-26

## 2022-06-08 RX ORDER — FOLIC ACID 1 MG/1
1 TABLET ORAL DAILY
COMMUNITY
Start: 2022-06-02 | End: 2022-08-31

## 2022-06-08 RX ORDER — METHOCARBAMOL 750 MG/1
750 TABLET, FILM COATED ORAL 3 TIMES DAILY
COMMUNITY
Start: 2022-06-03 | End: 2022-07-03

## 2022-06-08 SDOH — ECONOMIC STABILITY: FOOD INSECURITY: WITHIN THE PAST 12 MONTHS, YOU WORRIED THAT YOUR FOOD WOULD RUN OUT BEFORE YOU GOT MONEY TO BUY MORE.: NEVER TRUE

## 2022-06-08 SDOH — ECONOMIC STABILITY: FOOD INSECURITY: WITHIN THE PAST 12 MONTHS, THE FOOD YOU BOUGHT JUST DIDN'T LAST AND YOU DIDN'T HAVE MONEY TO GET MORE.: NEVER TRUE

## 2022-06-08 ASSESSMENT — ENCOUNTER SYMPTOMS
VOMITING: 0
NAUSEA: 1
COUGH: 0
COLOR CHANGE: 0
CONSTIPATION: 0
SINUS PRESSURE: 0
DIARRHEA: 0
SHORTNESS OF BREATH: 1
SORE THROAT: 0
WHEEZING: 0
ABDOMINAL PAIN: 1
BLOOD IN STOOL: 0

## 2022-06-08 ASSESSMENT — SOCIAL DETERMINANTS OF HEALTH (SDOH): HOW HARD IS IT FOR YOU TO PAY FOR THE VERY BASICS LIKE FOOD, HOUSING, MEDICAL CARE, AND HEATING?: NOT HARD AT ALL

## 2022-06-08 NOTE — PROGRESS NOTES
Subjective:     Patient ID: Daren Nichols is a 29 y.o. female who presentstoday for:  Chief Complaint   Patient presents with    Forms     FMLA Forms          Daren Nichols is a 29 y.o. female evaluated via telephone on 6/8/2022. Consent:  She and/or health care decision maker is aware that that she may receive a bill for this telephone service, depending on her insurance coverage, and has provided verbal consent to proceed: Yes      This visit was a telephone encounter. Patient was located at their home. Provider was located at their ___ home or        __x__ office. I affirm this is a Patient Initiated Episode with an Established Patient who has not had a related appointment within my department in the past 7 days or scheduled within the next 24 hours. Total Time: minutes: <5 minutes (not billable)    Note: not billable if this call serves to triage the patient into an appointment for the relevant concern      TAYLOR Maki CNP       --TAYLOR Maki CNP on 6/8/2022 at 12:55 PM    An electronic signature was used to authenticate this note.     HPI  Pt for f/u post covid syndrome  Is currently working with neurology and speech therapy on memory changes  Also has f/u with cardiology and pulmonology for continues SOB      Liver US reviewed with patient shows gallstone  Past Medical History:   Diagnosis Date    Abnormal Pap smear of cervix     JOHANNA (generalized anxiety disorder)     Hyperlipidemia     Major depression     Sleep disorder      Current Outpatient Medications on File Prior to Visit   Medication Sig Dispense Refill    methocarbamol (ROBAXIN) 750 MG tablet Take 750 mg by mouth 3 times daily      folic acid (FOLVITE) 1 MG tablet Take 1 mg by mouth daily      DULoxetine (CYMBALTA) 60 MG extended release capsule TAKE 1 CAPSULE BY MOUTH EVERY DAY      cloNIDine (CATAPRES) 0.1 MG tablet TAKE 1 TABLET BY MOUTH AT BEDTIME      vitamin D 25 MCG (1000 UT) CAPS Take 1,000 Units by mouth daily      amphetamine-dextroamphetamine (ADDERALL XR) 30 MG extended release capsule TAKE 1 CAPSULE BY MOUTH EVERY DAY for adhd      lamoTRIgine (LAMICTAL) 100 MG tablet TAKE 1 TABLET BY MOUTH EVERY MORNING and TAKE 2 TABLETS BY MOUTH AT BEDTIME      DULoxetine (CYMBALTA) 30 MG extended release capsule Take 30 mg (1 pill) daily for one week, then increase to 60 mg (2 pills) daily      naratriptan (AMERGE) 2.5 MG tablet Take 2.5 mg by mouth 2 times daily      topiramate (TOPAMAX) 25 MG tablet Take 25 mg (1 tablet) at bedtime for one week, then increase to 50 mg at bedtime      amphetamine-dextroamphetamine (ADDERALL, 10MG,) 10 MG tablet       amphetamine-dextroamphetamine (ADDERALL, 30MG,) 30 MG tablet       venlafaxine (EFFEXOR XR) 75 MG extended release capsule TAKE 3 CAPSULES BY MOUTH EVERY MORNING with a meal      traZODone (DESYREL) 50 MG tablet Take 1 tablet by mouth nightly 30 tablet 5    busPIRone (BUSPAR) 30 MG tablet Take 1 tablet by mouth twice a day 60 tablet 5    Levonorgestrel (MIRENA, 52 MG, IU) by Intrauterine route       No current facility-administered medications on file prior to visit.      Past Surgical History:   Procedure Laterality Date    LEEP          Family History   Problem Relation Age of Onset    Depression Mother     Lupus Mother     Heart Attack Mother      Social History     Socioeconomic History    Marital status:      Spouse name: Not on file    Number of children: Not on file    Years of education: Not on file    Highest education level: Not on file   Occupational History    Not on file   Tobacco Use    Smoking status: Never Smoker    Smokeless tobacco: Never Used   Substance and Sexual Activity    Alcohol use: No    Drug use: No    Sexual activity: Yes     Partners: Male   Other Topics Concern    Not on file   Social History Narrative    Not on file     Social Determinants of Health     Financial Resource Strain: Low Risk     Difficulty of Paying Living Expenses: Not hard at all   Food Insecurity: No Food Insecurity    Worried About Running Out of Food in the Last Year: Never true    Ran Out of Food in the Last Year: Never true   Transportation Needs:     Lack of Transportation (Medical): Not on file    Lack of Transportation (Non-Medical): Not on file   Physical Activity:     Days of Exercise per Week: Not on file    Minutes of Exercise per Session: Not on file   Stress:     Feeling of Stress : Not on file   Social Connections:     Frequency of Communication with Friends and Family: Not on file    Frequency of Social Gatherings with Friends and Family: Not on file    Attends Islam Services: Not on file    Active Member of [address] [place] [address] [employer] or Organizations: Not on file    Attends Club or Organization Meetings: Not on file    Marital Status: Not on file   Intimate Partner Violence:     Fear of Current or Ex-Partner: Not on file    Emotionally Abused: Not on file    Physically Abused: Not on file    Sexually Abused: Not on file   Housing Stability:     Unable to Pay for Housing in the Last Year: Not on file    Number of [place] in the Last Year: Not on file    Unstable Housing in the Last Year: Not on file     Allergies:  Sumatriptan and Mirtazapine    Review of Systems   Constitutional: Positive for activity change and fatigue. Negative for appetite change, chills, diaphoresis, fever and unexpected weight change. HENT: Negative for congestion, postnasal drip, sinus pressure and sore throat. Eyes: Negative for visual disturbance. Respiratory: Positive for shortness of breath. Negative for cough and wheezing. Cardiovascular: Negative for chest pain, palpitations and leg swelling. Gastrointestinal: Positive for abdominal pain (intermittent RUQ) and nausea. Negative for blood in stool, constipation, diarrhea and vomiting. Genitourinary: Negative for difficulty urinating.    Musculoskeletal: Positive for arthralgias and myalgias. Skin: Negative for color change. Neurological: Negative for dizziness, syncope and light-headedness. Psychiatric/Behavioral: Positive for confusion and decreased concentration. Objective: There were no vitals taken for this visit. Physical Exam  Constitutional:       General: She is not in acute distress. Pulmonary:      Effort: No respiratory distress. Neurological:      Mental Status: She is alert and oriented to person, place, and time. Psychiatric:         Mood and Affect: Mood normal.         Behavior: Behavior normal.         Assessment & Plan:       Diagnosis Orders   1. COVID-19 long hauler     2. Fibromyalgia     3. Memory impairment     4. Calculus of gallbladder with cholecystitis without biliary obstruction, unspecified cholecystitis acuity  Amb External Referral To General Surgery       Patient unable to work at this time due to post covid changes including memory impairment and shortness of breath. Will continue to work with [address] [place] [address] post covid program    Orders Placed This Encounter   Procedures    Amb External Referral To General Surgery     Referral Priority:   Routine     Referral Type:   Consult for Advice and Opinion     Referred to Provider:   Helen Yao MD     Requested Specialty:   General Surgery     Number of Visits Requested:   1     No orders of the defined types were placed in this encounter. There are no discontinued medications. No follow-ups on file. Reviewed with the patient: currentclinical status, medications, activities and diet. Side effects, adverse effects of the medicationprescribed today, as well as treatment plan/ rationale and result expectations havebeen discussed with the patient who expresses understanding and desires to proceed. Pt instructions reviewed and given to patient.     Close follow up to evaluate treatment resultsand for coordination of care.   I have reviewed the patient's medical historyin detail and updated the computerized patient record.     Armani Moe, TAYLOR - CNP

## 2023-10-02 PROBLEM — F90.9 ADHD (ATTENTION DEFICIT HYPERACTIVITY DISORDER): Status: ACTIVE | Noted: 2023-10-02

## 2023-10-02 PROBLEM — F31.9 BIPOLAR DEPRESSION (MULTI): Status: ACTIVE | Noted: 2023-10-02

## 2023-10-02 PROBLEM — F41.9 ANXIETY: Status: ACTIVE | Noted: 2023-10-02

## 2023-10-02 PROBLEM — K76.0 NONALCOHOLIC FATTY LIVER DISEASE: Status: ACTIVE | Noted: 2023-10-02

## 2023-10-02 PROBLEM — G47.9 SLEEP DIFFICULTIES: Status: ACTIVE | Noted: 2023-10-02

## 2023-10-02 PROBLEM — R41.3 MEMORY DIFFICULTY: Status: ACTIVE | Noted: 2023-10-02

## 2023-10-02 PROBLEM — K21.9 GERD (GASTROESOPHAGEAL REFLUX DISEASE): Status: ACTIVE | Noted: 2023-10-02

## 2023-10-02 PROBLEM — R06.89 BREATHING DIFFICULTY: Status: ACTIVE | Noted: 2023-10-02

## 2023-10-02 PROBLEM — A35: Status: ACTIVE | Noted: 2023-10-02

## 2023-10-02 PROBLEM — M79.7 FIBROMYALGIA: Status: ACTIVE | Noted: 2023-10-02

## 2023-10-02 PROBLEM — R52: Status: ACTIVE | Noted: 2023-10-02

## 2023-10-02 PROBLEM — F33.8 SEASONAL AFFECTIVE DISORDER (CMS-HCC): Status: ACTIVE | Noted: 2023-10-02

## 2023-10-02 PROBLEM — G43.909 MIGRAINES: Status: ACTIVE | Noted: 2023-10-02

## 2023-10-02 PROBLEM — F43.21 FEELING GRIEF: Status: ACTIVE | Noted: 2023-10-02

## 2023-10-02 PROBLEM — F44.9 DISASSOCIATION DISORDER: Status: ACTIVE | Noted: 2023-10-02

## 2023-10-02 PROBLEM — R41.89 BRAIN FOG: Status: ACTIVE | Noted: 2023-10-02

## 2023-10-02 PROBLEM — G47.30 SLEEP APNEA: Status: ACTIVE | Noted: 2023-10-02

## 2023-10-02 PROBLEM — F42.8 OBSESSIVE THINKING: Status: ACTIVE | Noted: 2023-10-02

## 2023-10-02 PROBLEM — F43.10 PTSD (POST-TRAUMATIC STRESS DISORDER): Status: ACTIVE | Noted: 2023-10-02

## 2023-10-02 PROBLEM — R29.898: Status: ACTIVE | Noted: 2023-10-02

## 2023-10-02 RX ORDER — CLONIDINE HYDROCHLORIDE 0.1 MG/1
1 TABLET ORAL NIGHTLY
COMMUNITY
Start: 2022-02-23 | End: 2023-10-04 | Stop reason: ALTCHOICE

## 2023-10-02 RX ORDER — DULOXETIN HYDROCHLORIDE 60 MG/1
1 CAPSULE, DELAYED RELEASE ORAL DAILY
COMMUNITY
Start: 2022-05-26 | End: 2023-12-04 | Stop reason: SDUPTHER

## 2023-10-02 RX ORDER — LAMOTRIGINE 100 MG/1
3 TABLET ORAL DAILY
COMMUNITY
Start: 2020-05-28

## 2023-10-02 RX ORDER — ONDANSETRON 4 MG/1
1 TABLET, FILM COATED ORAL EVERY 8 HOURS PRN
COMMUNITY
Start: 2022-08-25

## 2023-10-02 RX ORDER — POLYETHYLENE GLYCOL 3350 17 G/17G
17 POWDER, FOR SOLUTION ORAL 2 TIMES DAILY
COMMUNITY
Start: 2022-04-08 | End: 2023-10-04 | Stop reason: ALTCHOICE

## 2023-10-02 RX ORDER — TRAZODONE HYDROCHLORIDE 50 MG/1
1-2 TABLET ORAL NIGHTLY PRN
COMMUNITY
Start: 2022-09-07 | End: 2024-01-18 | Stop reason: SDUPTHER

## 2023-10-02 RX ORDER — AZELASTINE 1 MG/ML
1 SPRAY, METERED NASAL 2 TIMES DAILY
COMMUNITY
End: 2023-12-04 | Stop reason: WASHOUT

## 2023-10-02 RX ORDER — HYDROXYZINE HYDROCHLORIDE 25 MG/1
1 TABLET, FILM COATED ORAL 3 TIMES DAILY PRN
COMMUNITY
Start: 2021-10-14

## 2023-10-02 RX ORDER — LEVONORGESTREL 52 MG/1
INTRAUTERINE DEVICE INTRAUTERINE
COMMUNITY
Start: 2021-08-06

## 2023-10-02 RX ORDER — BUPROPION HYDROCHLORIDE 150 MG/1
1 TABLET ORAL DAILY
COMMUNITY
End: 2024-02-07

## 2023-10-02 RX ORDER — BUSPIRONE HYDROCHLORIDE 30 MG/1
1 TABLET ORAL 2 TIMES DAILY
COMMUNITY
Start: 2020-04-24 | End: 2023-12-04 | Stop reason: SDUPTHER

## 2023-10-02 RX ORDER — MAGNESIUM 200 MG
TABLET ORAL
COMMUNITY
End: 2023-10-04 | Stop reason: ALTCHOICE

## 2023-10-02 RX ORDER — METHYLPHENIDATE HYDROCHLORIDE 36 MG/1
1 TABLET ORAL EVERY MORNING
COMMUNITY
Start: 2022-09-07 | End: 2023-10-04 | Stop reason: ALTCHOICE

## 2023-10-02 RX ORDER — METHOCARBAMOL 750 MG/1
750 TABLET, FILM COATED ORAL 3 TIMES DAILY
COMMUNITY
Start: 2022-07-07

## 2023-10-02 RX ORDER — ALBUTEROL SULFATE 90 UG/1
2 INHALANT RESPIRATORY (INHALATION) EVERY 4 HOURS PRN
COMMUNITY
Start: 2022-07-07 | End: 2024-01-08 | Stop reason: WASHOUT

## 2023-10-04 ENCOUNTER — CLINICAL SUPPORT (OUTPATIENT)
Dept: BEHAVIORAL HEALTH | Facility: CLINIC | Age: 38
End: 2023-10-04
Payer: MEDICAID

## 2023-10-04 DIAGNOSIS — F41.9 ANXIETY: ICD-10-CM

## 2023-10-04 DIAGNOSIS — F31.9 BIPOLAR DEPRESSION (MULTI): ICD-10-CM

## 2023-10-04 DIAGNOSIS — G47.9 SLEEP DIFFICULTIES: ICD-10-CM

## 2023-10-04 DIAGNOSIS — F43.10 PTSD (POST-TRAUMATIC STRESS DISORDER): ICD-10-CM

## 2023-10-04 DIAGNOSIS — F42.8 OBSESSIVE THINKING: ICD-10-CM

## 2023-10-04 PROCEDURE — 99213 OFFICE O/P EST LOW 20 MIN: CPT | Performed by: NURSE PRACTITIONER

## 2023-10-04 RX ORDER — HYOSCYAMINE SULFATE 0.12 MG/1
0.12 TABLET, ORALLY DISINTEGRATING ORAL EVERY 4 HOURS PRN
COMMUNITY
Start: 2023-01-16

## 2023-10-04 ASSESSMENT — PATIENT HEALTH QUESTIONNAIRE - PHQ9
3. TROUBLE FALLING OR STAYING ASLEEP OR SLEEPING TOO MUCH: MORE THAN HALF THE DAYS
7. TROUBLE CONCENTRATING ON THINGS, SUCH AS READING THE NEWSPAPER OR WATCHING TELEVISION: NEARLY EVERY DAY
8. MOVING OR SPEAKING SO SLOWLY THAT OTHER PEOPLE COULD HAVE NOTICED. OR THE OPPOSITE, BEING SO FIGETY OR RESTLESS THAT YOU HAVE BEEN MOVING AROUND A LOT MORE THAN USUAL: NEARLY EVERY DAY
2. FEELING DOWN, DEPRESSED OR HOPELESS: MORE THAN HALF THE DAYS
5. POOR APPETITE OR OVEREATING: NEARLY EVERY DAY
9. THOUGHTS THAT YOU WOULD BE BETTER OFF DEAD, OR OF HURTING YOURSELF: NOT AT ALL
4. FEELING TIRED OR HAVING LITTLE ENERGY: NEARLY EVERY DAY
10. IF YOU CHECKED OFF ANY PROBLEMS, HOW DIFFICULT HAVE THESE PROBLEMS MADE IT FOR YOU TO DO YOUR WORK, TAKE CARE OF THINGS AT HOME, OR GET ALONG WITH OTHER PEOPLE: NOT DIFFICULT AT ALL
6. FEELING BAD ABOUT YOURSELF - OR THAT YOU ARE A FAILURE OR HAVE LET YOURSELF OR YOUR FAMILY DOWN: MORE THAN HALF THE DAYS
1. LITTLE INTEREST OR PLEASURE IN DOING THINGS: MORE THAN HALF THE DAYS

## 2023-10-04 ASSESSMENT — ANXIETY QUESTIONNAIRES
3. WORRYING TOO MUCH ABOUT DIFFERENT THINGS: NEARLY EVERY DAY
4. TROUBLE RELAXING: NEARLY EVERY DAY
1. FEELING NERVOUS, ANXIOUS, OR ON EDGE: NEARLY EVERY DAY
7. FEELING AFRAID AS IF SOMETHING AWFUL MIGHT HAPPEN: MORE THAN HALF THE DAYS
IF YOU CHECKED OFF ANY PROBLEMS ON THIS QUESTIONNAIRE, HOW DIFFICULT HAVE THESE PROBLEMS MADE IT FOR YOU TO DO YOUR WORK, TAKE CARE OF THINGS AT HOME, OR GET ALONG WITH OTHER PEOPLE: EXTREMELY DIFFICULT
GAD7 TOTAL SCORE: 20
6. BECOMING EASILY ANNOYED OR IRRITABLE: NEARLY EVERY DAY
2. NOT BEING ABLE TO STOP OR CONTROL WORRYING: NEARLY EVERY DAY
5. BEING SO RESTLESS THAT IT IS HARD TO SIT STILL: NEARLY EVERY DAY
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
## 2023-12-04 ENCOUNTER — TELEMEDICINE (OUTPATIENT)
Dept: BEHAVIORAL HEALTH | Facility: CLINIC | Age: 38
End: 2023-12-04
Payer: MEDICAID

## 2023-12-04 DIAGNOSIS — F41.9 ANXIETY: ICD-10-CM

## 2023-12-04 DIAGNOSIS — F90.0 ATTENTION DEFICIT HYPERACTIVITY DISORDER (ADHD), PREDOMINANTLY INATTENTIVE TYPE: ICD-10-CM

## 2023-12-04 DIAGNOSIS — F33.8 SEASONAL AFFECTIVE DISORDER (CMS-HCC): ICD-10-CM

## 2023-12-04 DIAGNOSIS — F43.10 PTSD (POST-TRAUMATIC STRESS DISORDER): ICD-10-CM

## 2023-12-04 DIAGNOSIS — F42.8 OBSESSIVE THINKING: ICD-10-CM

## 2023-12-04 DIAGNOSIS — R41.89 BRAIN FOG: ICD-10-CM

## 2023-12-04 DIAGNOSIS — G47.9 SLEEP DIFFICULTIES: ICD-10-CM

## 2023-12-04 DIAGNOSIS — F31.9 BIPOLAR DEPRESSION (MULTI): Primary | ICD-10-CM

## 2023-12-04 DIAGNOSIS — R44.1 VISUAL HALLUCINATIONS: ICD-10-CM

## 2023-12-04 PROCEDURE — 99214 OFFICE O/P EST MOD 30 MIN: CPT | Performed by: NURSE PRACTITIONER

## 2023-12-04 RX ORDER — DULOXETIN HYDROCHLORIDE 60 MG/1
60 CAPSULE, DELAYED RELEASE ORAL DAILY
Qty: 90 CAPSULE | Refills: 3 | Status: SHIPPED | OUTPATIENT
Start: 2023-12-04 | End: 2024-12-03

## 2023-12-04 RX ORDER — BUSPIRONE HYDROCHLORIDE 30 MG/1
30 TABLET ORAL 2 TIMES DAILY
Qty: 180 TABLET | Refills: 3 | Status: SHIPPED | OUTPATIENT
Start: 2023-12-04 | End: 2024-12-03

## 2023-12-04 RX ORDER — INHALER, ASSIST DEVICES
1 SPACER (EA) MISCELLANEOUS DAILY PRN
COMMUNITY
Start: 2023-10-31

## 2023-12-04 RX ORDER — ALBUTEROL SULFATE 90 UG/1
2 AEROSOL, METERED RESPIRATORY (INHALATION) EVERY 4 HOURS PRN
COMMUNITY
Start: 2023-10-31

## 2023-12-04 RX ORDER — LORATADINE 10 MG/1
10 TABLET ORAL AS NEEDED
COMMUNITY
Start: 2023-11-13

## 2023-12-04 ASSESSMENT — ANXIETY QUESTIONNAIRES
GAD7 TOTAL SCORE: 21
IF YOU CHECKED OFF ANY PROBLEMS ON THIS QUESTIONNAIRE, HOW DIFFICULT HAVE THESE PROBLEMS MADE IT FOR YOU TO DO YOUR WORK, TAKE CARE OF THINGS AT HOME, OR GET ALONG WITH OTHER PEOPLE: EXTREMELY DIFFICULT
2. NOT BEING ABLE TO STOP OR CONTROL WORRYING: NEARLY EVERY DAY
1. FEELING NERVOUS, ANXIOUS, OR ON EDGE: NEARLY EVERY DAY
4. TROUBLE RELAXING: NEARLY EVERY DAY
5. BEING SO RESTLESS THAT IT IS HARD TO SIT STILL: NEARLY EVERY DAY
6. BECOMING EASILY ANNOYED OR IRRITABLE: NEARLY EVERY DAY
7. FEELING AFRAID AS IF SOMETHING AWFUL MIGHT HAPPEN: NEARLY EVERY DAY
3. WORRYING TOO MUCH ABOUT DIFFERENT THINGS: NEARLY EVERY DAY

## 2023-12-04 ASSESSMENT — ENCOUNTER SYMPTOMS
RESPIRATORY NEGATIVE: 1
GASTROINTESTINAL NEGATIVE: 1
CARDIOVASCULAR NEGATIVE: 1

## 2023-12-04 ASSESSMENT — PATIENT HEALTH QUESTIONNAIRE - PHQ9
5. POOR APPETITE OR OVEREATING: NEARLY EVERY DAY
2. FEELING DOWN, DEPRESSED OR HOPELESS: NEARLY EVERY DAY
8. MOVING OR SPEAKING SO SLOWLY THAT OTHER PEOPLE COULD HAVE NOTICED. OR THE OPPOSITE, BEING SO FIGETY OR RESTLESS THAT YOU HAVE BEEN MOVING AROUND A LOT MORE THAN USUAL: MORE THAN HALF THE DAYS
4. FEELING TIRED OR HAVING LITTLE ENERGY: NEARLY EVERY DAY
1. LITTLE INTEREST OR PLEASURE IN DOING THINGS: NEARLY EVERY DAY
7. TROUBLE CONCENTRATING ON THINGS, SUCH AS READING THE NEWSPAPER OR WATCHING TELEVISION: NEARLY EVERY DAY
6. FEELING BAD ABOUT YOURSELF - OR THAT YOU ARE A FAILURE OR HAVE LET YOURSELF OR YOUR FAMILY DOWN: MORE THAN HALF THE DAYS
3. TROUBLE FALLING OR STAYING ASLEEP OR SLEEPING TOO MUCH: MORE THAN HALF THE DAYS
9. THOUGHTS THAT YOU WOULD BE BETTER OFF DEAD, OR OF HURTING YOURSELF: NOT AT ALL
10. IF YOU CHECKED OFF ANY PROBLEMS, HOW DIFFICULT HAVE THESE PROBLEMS MADE IT FOR YOU TO DO YOUR WORK, TAKE CARE OF THINGS AT HOME, OR GET ALONG WITH OTHER PEOPLE: EXTREMELY DIFFICULT
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
## 2024-01-08 ENCOUNTER — TELEMEDICINE (OUTPATIENT)
Dept: BEHAVIORAL HEALTH | Facility: CLINIC | Age: 39
End: 2024-01-08
Payer: MEDICAID

## 2024-01-08 DIAGNOSIS — F33.8 SEASONAL AFFECTIVE DISORDER (CMS-HCC): ICD-10-CM

## 2024-01-08 DIAGNOSIS — F43.10 PTSD (POST-TRAUMATIC STRESS DISORDER): ICD-10-CM

## 2024-01-08 DIAGNOSIS — F31.9 BIPOLAR DEPRESSION (MULTI): ICD-10-CM

## 2024-01-08 DIAGNOSIS — F42.8 OBSESSIVE THINKING: ICD-10-CM

## 2024-01-08 DIAGNOSIS — F90.2 ATTENTION DEFICIT HYPERACTIVITY DISORDER, COMBINED TYPE: ICD-10-CM

## 2024-01-08 DIAGNOSIS — F31.81 BIPOLAR 2 DISORDER (MULTI): ICD-10-CM

## 2024-01-08 DIAGNOSIS — G47.9 SLEEP DIFFICULTIES: ICD-10-CM

## 2024-01-08 DIAGNOSIS — F41.1 GAD (GENERALIZED ANXIETY DISORDER): ICD-10-CM

## 2024-01-08 PROBLEM — J32.8 OTHER CHRONIC SINUSITIS: Status: ACTIVE | Noted: 2022-12-09

## 2024-01-08 PROBLEM — G89.29 CENTRAL SENSITIZATION TO PAIN: Status: ACTIVE | Noted: 2022-04-05

## 2024-01-08 PROBLEM — L91.8 SKIN TAG: Status: ACTIVE | Noted: 2022-07-18

## 2024-01-08 PROBLEM — E66.01 MORBID OBESITY (MULTI): Status: ACTIVE | Noted: 2020-01-16

## 2024-01-08 PROBLEM — J30.9 ALLERGIC RHINITIS: Status: ACTIVE | Noted: 2022-12-09

## 2024-01-08 PROBLEM — G47.33 OSA (OBSTRUCTIVE SLEEP APNEA): Status: ACTIVE | Noted: 2023-05-23

## 2024-01-08 PROBLEM — R20.0 BILATERAL HAND NUMBNESS: Status: ACTIVE | Noted: 2021-08-11

## 2024-01-08 PROBLEM — E78.5 HYPERLIPIDEMIA: Status: ACTIVE | Noted: 2024-01-08

## 2024-01-08 PROBLEM — N83.201 OVARIAN CYST, RIGHT: Status: ACTIVE | Noted: 2023-03-03

## 2024-01-08 PROBLEM — Z97.5 IUD (INTRAUTERINE DEVICE) IN PLACE: Status: ACTIVE | Noted: 2023-03-03

## 2024-01-08 PROBLEM — G56.01 RIGHT CARPAL TUNNEL SYNDROME: Status: ACTIVE | Noted: 2021-09-30

## 2024-01-08 PROBLEM — U09.9 POST-ACUTE SEQUELAE OF COVID-19 (PASC): Status: ACTIVE | Noted: 2022-05-16

## 2024-01-08 PROBLEM — M62.81 MUSCLE WEAKNESS: Status: ACTIVE | Noted: 2020-03-10

## 2024-01-08 PROBLEM — R41.841 COGNITIVE COMMUNICATION DEFICIT: Status: ACTIVE | Noted: 2022-05-16

## 2024-01-08 PROBLEM — G43.711 CHRONIC MIGRAINE WITHOUT AURA, WITH INTRACTABLE MIGRAINE, SO STATED, WITH STATUS MIGRAINOSUS: Status: ACTIVE | Noted: 2022-03-16

## 2024-01-08 PROBLEM — R41.89 IMPAIRED COGNITION: Status: ACTIVE | Noted: 2021-03-10

## 2024-01-08 PROBLEM — D22.9 NEVUS: Status: ACTIVE | Noted: 2022-07-18

## 2024-01-08 PROCEDURE — 99214 OFFICE O/P EST MOD 30 MIN: CPT | Performed by: NURSE PRACTITIONER

## 2024-01-08 ASSESSMENT — ANXIETY QUESTIONNAIRES
2. NOT BEING ABLE TO STOP OR CONTROL WORRYING: NEARLY EVERY DAY
5. BEING SO RESTLESS THAT IT IS HARD TO SIT STILL: NEARLY EVERY DAY
7. FEELING AFRAID AS IF SOMETHING AWFUL MIGHT HAPPEN: NEARLY EVERY DAY
6. BECOMING EASILY ANNOYED OR IRRITABLE: NEARLY EVERY DAY
3. WORRYING TOO MUCH ABOUT DIFFERENT THINGS: NEARLY EVERY DAY
4. TROUBLE RELAXING: NEARLY EVERY DAY
1. FEELING NERVOUS, ANXIOUS, OR ON EDGE: NEARLY EVERY DAY
IF YOU CHECKED OFF ANY PROBLEMS ON THIS QUESTIONNAIRE, HOW DIFFICULT HAVE THESE PROBLEMS MADE IT FOR YOU TO DO YOUR WORK, TAKE CARE OF THINGS AT HOME, OR GET ALONG WITH OTHER PEOPLE: VERY DIFFICULT
GAD7 TOTAL SCORE: 21

## 2024-01-08 ASSESSMENT — PATIENT HEALTH QUESTIONNAIRE - PHQ9
5. POOR APPETITE OR OVEREATING: NEARLY EVERY DAY
1. LITTLE INTEREST OR PLEASURE IN DOING THINGS: MORE THAN HALF THE DAYS
2. FEELING DOWN, DEPRESSED OR HOPELESS: MORE THAN HALF THE DAYS
6. FEELING BAD ABOUT YOURSELF - OR THAT YOU ARE A FAILURE OR HAVE LET YOURSELF OR YOUR FAMILY DOWN: MORE THAN HALF THE DAYS
3. TROUBLE FALLING OR STAYING ASLEEP OR SLEEPING TOO MUCH: NEARLY EVERY DAY
10. IF YOU CHECKED OFF ANY PROBLEMS, HOW DIFFICULT HAVE THESE PROBLEMS MADE IT FOR YOU TO DO YOUR WORK, TAKE CARE OF THINGS AT HOME, OR GET ALONG WITH OTHER PEOPLE: EXTREMELY DIFFICULT
7. TROUBLE CONCENTRATING ON THINGS, SUCH AS READING THE NEWSPAPER OR WATCHING TELEVISION: NEARLY EVERY DAY
9. THOUGHTS THAT YOU WOULD BE BETTER OFF DEAD, OR OF HURTING YOURSELF: NOT AT ALL
8. MOVING OR SPEAKING SO SLOWLY THAT OTHER PEOPLE COULD HAVE NOTICED. OR THE OPPOSITE, BEING SO FIGETY OR RESTLESS THAT YOU HAVE BEEN MOVING AROUND A LOT MORE THAN USUAL: NEARLY EVERY DAY
4. FEELING TIRED OR HAVING LITTLE ENERGY: NEARLY EVERY DAY

## 2024-01-08 ASSESSMENT — ENCOUNTER SYMPTOMS
GASTROINTESTINAL NEGATIVE: 1
RESPIRATORY NEGATIVE: 1
CARDIOVASCULAR NEGATIVE: 1
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
## 2024-01-09 PROBLEM — F43.10 PTSD (POST-TRAUMATIC STRESS DISORDER): Status: ACTIVE | Noted: 2024-01-09

## 2024-01-09 PROBLEM — F41.9 ANXIETY: Status: RESOLVED | Noted: 2023-10-02 | Resolved: 2024-01-09

## 2024-01-09 ASSESSMENT — ENCOUNTER SYMPTOMS: NEUROLOGICAL NEGATIVE: 1

## 2024-01-18 DIAGNOSIS — G47.9 SLEEP DIFFICULTIES: Primary | ICD-10-CM

## 2024-01-18 RX ORDER — TRAZODONE HYDROCHLORIDE 50 MG/1
50-100 TABLET ORAL NIGHTLY PRN
Qty: 180 TABLET | Refills: 3 | Status: SHIPPED | OUTPATIENT
Start: 2024-01-18 | End: 2025-01-17

## 2024-02-06 ENCOUNTER — TELEMEDICINE (OUTPATIENT)
Dept: BEHAVIORAL HEALTH | Facility: CLINIC | Age: 39
End: 2024-02-06
Payer: MEDICAID

## 2024-02-06 DIAGNOSIS — R44.1 VISUAL HALLUCINATIONS: ICD-10-CM

## 2024-02-06 DIAGNOSIS — F31.81 BIPOLAR 2 DISORDER (MULTI): ICD-10-CM

## 2024-02-06 DIAGNOSIS — F42.8 OBSESSIVE THINKING: ICD-10-CM

## 2024-02-06 DIAGNOSIS — F31.9 BIPOLAR DEPRESSION (MULTI): Primary | ICD-10-CM

## 2024-02-06 DIAGNOSIS — G47.9 SLEEP DIFFICULTIES: ICD-10-CM

## 2024-02-06 DIAGNOSIS — F41.1 GAD (GENERALIZED ANXIETY DISORDER): ICD-10-CM

## 2024-02-06 DIAGNOSIS — F33.8 SEASONAL AFFECTIVE DISORDER (CMS-HCC): ICD-10-CM

## 2024-02-06 DIAGNOSIS — R41.3 MEMORY DIFFICULTY: ICD-10-CM

## 2024-02-06 DIAGNOSIS — F90.2 ATTENTION DEFICIT HYPERACTIVITY DISORDER, COMBINED TYPE: ICD-10-CM

## 2024-02-06 DIAGNOSIS — F43.10 PTSD (POST-TRAUMATIC STRESS DISORDER): ICD-10-CM

## 2024-02-06 PROCEDURE — 99214 OFFICE O/P EST MOD 30 MIN: CPT | Performed by: NURSE PRACTITIONER

## 2024-02-06 PROCEDURE — 1036F TOBACCO NON-USER: CPT | Performed by: NURSE PRACTITIONER

## 2024-02-06 ASSESSMENT — ENCOUNTER SYMPTOMS
RESPIRATORY NEGATIVE: 1
GASTROINTESTINAL NEGATIVE: 1
CARDIOVASCULAR NEGATIVE: 1
NEUROLOGICAL NEGATIVE: 1

## 2024-02-06 NOTE — PROGRESS NOTES
"Adult Ambulatory Psychiatry Progress Note      Assessment/Plan     Impression:  Nova Kohler is a 38 y.o. female domiciled  in process of , employed as [employer] , who presents for follow up with CC of \"Things are good and bad. I am getting settled into the house which is good, but my depression had skyrocketed for the past 3 weeks, so I did start the Vraylar 2 days ago.\"    Plan: Reviewed and agreed to leaving medications and treatment plan with continuation of Vraylar and discontinuation of Wellbutrin, as she reported improvement with depression and feels that since she is settling into her new home, her stress levels are coming down. Will be seeing a new therapist soon.    Medication: Vraylar 1.5mg every day. Discontinues Wellbutrin, Buspar 30mg twice daily, Cymbalta 60mg every day, Atarax 25mg (up to 75mg) as needed for anxiety management, Lamictal 300mg every day, and Trazodone 50-100mg at bedtime.    Reviewed r/b/a, possible side effects of the medication. Client is aware about the benefit outweighs the risk.     Diagnoses and all orders for this visit:  Bipolar depression (CMS/HCC)  -     cariprazine (Vraylar) 1.5 mg capsule; Take 1 capsule (1.5 mg) by mouth once daily. Do not start before February 26, 2024.  Visual hallucinations  -     cariprazine (Vraylar) 1.5 mg capsule; Take 1 capsule (1.5 mg) by mouth once daily. Do not start before February 26, 2024.  Attention deficit hyperactivity disorder, combined type  Bipolar 2 disorder (CMS/HCC)  Obsessive thinking  JESSICA (generalized anxiety disorder)  Seasonal affective disorder (CMS/HCC)  PTSD (post-traumatic stress disorder)  Memory difficulty  Sleep difficulties      Therapy: none    Other: n/a    Patient is reminded that if there is SI to call [phone] and get themselves to the closest ED for evaluation, otherwise contact me for other questions/concerns.     Subjective   HPI:  \"Both the patient, and family and caregivers and guardians as " "appropriate, were informed of the current need to conduct treatment via telephone. I have confirmed the patient's identity via the following (minimum of three) acceptable identifiers as per  Policy PH-9: , S.S., home address.\"     Present Illness - anxiety, depression     Characteristics/Recent psychiatric symptoms (pertinent positives and negatives) - reports [facility] will [facility] for February's rent and part of March and will have to reach out to [facility] to talk about how to continue paying for the rent for the home as she is now under Section 8 housing and she doesn't want to lose the coverage/points that would help her get the funds to pay for the housing. Reports she does now get child-support monthly and 'it is sufficient' but the divorce is still ongoing. Custody is still the same, but her son Sam has been a 'raging lunatic and I think puberty has started and he is argumentative and he will be the death of me.' Reports depression started 3 weeks ago got stronger because her son didn't want to come over and spend time with her and be with her, and as her ex allows their son unlimited screen time and she limits to no screen time, 'he gets upset or doesn't want to be bothered, so doesn't want to come over as he would have to read a book, go outside or do a chore. He told me that it doesn't feel like home, which I get but I can't make it any more welcoming. At least Dominik is supportive of having Sam but there is always a low arcos reason behind that....' Reports started the Vraylar 2 days ago and 'it was a big thing, where I noticed immediately where I didn't feel like I wanted to cry all the time. I will take it. I would say the depression was a 10 and now it is down to a 3-4.' Reports her anxiety is 'still plenty strong'. Reports still no longer seeing VH (shadows), but does feel lonely lately, as 'I used to be around people all my life, and then while  I felt alone. Then I " moved into the shelter and there was a lot of people all the time, and that was a lot of peopling for me. Now, being here alone, it is really unnerving.' Reports she is able to make it through the days in her home with her puppy and her cats and she does enjoy their love and company, including talking to her pets, but does talk with her sister frequently, and her son. Does admit she did lose her best friend over the weekend (had become spiteful and low key mean to the patient, and it's her friend's bf being mean to her friend and as the patient expresses concern to her patient, she is now taking it out on the patient, and there is concern for her friend who is drinking way too much and the friend has lashed out at her), and feels she needs to rekindle friendships that has not maintained over the years. Still denies renate. Reports stress/anxiety is 'a solid 8'. Sleep alternates from 'can't sleep at all to not wanting to get out of bed.' Ranges from 4-11 hours and admits the lack of sleep is d/t 'my brain won't chill out, then my body feels fidgety'. This will impact her the next night because of lack of sleep the night before. Still takes Trazodone 100mg HS. Reports noticing the racing, obsessive thoughts are constant, but 'I am paying more attention to the past conversations with what I should have said, and now it is also me wanting to think about starting my garden,' reflecting a lot of ruminating thoughts. Reports energy levels are stable through the day, and mental/physical fatigue is 'ok but it feels like sundowners,' as once the sun sets she is done for the day. Reports her appetite still fluctuates where 'I feel like I want to eat the house and other days I don't want to eat anything because I feel nauseous.' Attention/focus is 'bad as ever.'     Onset/timeframe - 3 weeks  Type - anxiety, depression  Duration - daily  Aggravating and/or relieving factors/triggers - settled into new home which has allayed some  of her anxiety, but son's behaviors - more avoidant, not wanting to be with her because she is strict with no electronics, TV, has left her feeling more sad than usual, but starting Vraylar 2 days prior has felt an improvement in her mood already.  Related symptoms  Treatment and treatment changes (new meds, dosage increases or decreases, med compliance, therapy frequency, etc.) (Past and Recent) - Vraylar 1.5mg QD, Wellbutrin XL 150mg QD, Buspar 30mg BID, Lamictal 300mg QD, Trazodone 50mg @HS, Cymbalta 60mg QD. Atarax 25mg-75mg PRN/TID. Still looking for a new therapist.     Issues: Denies SI/HI/AVH, currently.     VH have stopped even without her having started Vraylar.          Review of Systems   Respiratory: Negative.     Cardiovascular: Negative.    Gastrointestinal: Negative.    Genitourinary: Negative.    Musculoskeletal: Negative.    Neurological: Negative.        OARRS:  Blake Gupta, APRN-CNP on 2/7/2024  7:00 PM  I have personally reviewed the OARRS report for Nova Kohler. I have considered the risks of abuse, dependence, addiction and diversion    Is the patient prescribed a combination of a benzodiazepine and opioid?  No    Last Urine Drug Screen / ordered today: No  No results found for this or any previous visit (from the past 8760 hour(s)).    N/A    Clinical rationale for not completing a Urine Drug Screen: Not ordered since stimulants were discontinued in May 2023.      Controlled Substance Agreement:  Date of the Last Agreement: N/A    Objective   Mental Status Exam:  General Appearance: Well groomed, appropriate eye contact  Attitude/Behavior: Cooperative, Distracted  Motor: Fidgeting  Speech: Rapid Speech, pressured  Gait/Station: Other:(comment) (sitting on couch in new home over virtual connection)  Mood: 'getting better'  Affect: Constricted, Anxious, Congruent with mood and topic of conversation  Thought Process: Linear, goal directed, Circumstantial, Perseverative, Thought  blocking  Thought Associations: No loosening of associations  Thought Content: Normal  Perception: No perceptual abnormalities noted  Sensorium: Alert and oriented to person, place, time and situation  Insight: Intact  Judgement: Fair  Cognition: Cognitively intact to conversational testing with respect to attention, orientation, fund of knowledge, recent and remote memory, and language  Testing: N/A    JESSICA-7/PHQ-9 scores reviewed: 10, 17 compared to 21,21 reflecting improvement with anxiety and depression.    Current Medications:  Current Outpatient Medications on File Prior to Visit   Medication Sig Dispense Refill    busPIRone (Buspar) 30 mg tablet Take 1 tablet (30 mg) by mouth 2 times a day. As directed 180 tablet 3    DULoxetine (Cymbalta) 60 mg DR capsule Take 1 capsule (60 mg) by mouth once daily. 90 capsule 3    hydrOXYzine HCL (Atarax) 25 mg tablet Take 1 tablet (25 mg) by mouth 3 times a day as needed.      lamoTRIgine (LaMICtal) 100 mg tablet Take 3 tablets (300 mg) by mouth once daily.  As Directed Take 1 tablet in the AM and 2 tablets at bedtime.      traZODone (Desyrel) 50 mg tablet Take 1-2 tablets ( mg) by mouth as needed at bedtime for sleep. 180 tablet 3    [DISCONTINUED] cariprazine (Vraylar) 1.5 mg capsule Take 1 capsule (1.5 mg) by mouth once daily. 60 capsule 0    albuterol 90 mcg/actuation inhaler Inhale 2 puffs every 4 hours if needed for wheezing or shortness of breath.      Allergy Relief, loratadine, 10 mg tablet Take 1 tablet (10 mg) by mouth once daily.      BreatheRite MDI Spacer inhaler 1 each once daily as needed (for asthma relief PRN).      hyoscyamine 0.125 mg disintegrating tablet Place 1 tablet (0.125 mg) under the tongue every 4 hours if needed (as needed).      levonorgestrel (Mirena) 21 mcg/24 hours (8 yrs) 52 mg IUD by intrauterine route. Use as directed      methocarbamol (Robaxin) 750 mg tablet Take by mouth.      ondansetron (Zofran) 4 mg tablet Take 1 tablet (4 mg)  by mouth every 8 hours if needed for vomiting or nausea.      [DISCONTINUED] buPROPion XL (Wellbutrin XL) 150 mg 24 hr tablet Take 1 tablet (150 mg) by mouth once daily. Do not crush, chew, or split.       No current facility-administered medications on file prior to visit.       Lab Review:   No visits with results within 2 Month(s) from this visit.   Latest known visit with results is:   [facility] Encounter on 12/09/2022   Component Date Value    Methylphenidate Urine Qu* 12/09/2022 1,210.8     Ritalinic acid Urine 12/09/2022 >5000.0          Time Spent:    Prep time: 1 min.  Direct patient time: 30 min.  Documentation time: 6 min.  Total time: 37 min.    Next Appointment:  Follow up in about 6 weeks (around 3/19/2024).

## 2024-02-07 ASSESSMENT — ANXIETY QUESTIONNAIRES
7. FEELING AFRAID AS IF SOMETHING AWFUL MIGHT HAPPEN: SEVERAL DAYS
3. WORRYING TOO MUCH ABOUT DIFFERENT THINGS: SEVERAL DAYS
GAD7 TOTAL SCORE: 10
1. FEELING NERVOUS, ANXIOUS, OR ON EDGE: MORE THAN HALF THE DAYS
IF YOU CHECKED OFF ANY PROBLEMS ON THIS QUESTIONNAIRE, HOW DIFFICULT HAVE THESE PROBLEMS MADE IT FOR YOU TO DO YOUR WORK, TAKE CARE OF THINGS AT HOME, OR GET ALONG WITH OTHER PEOPLE: VERY DIFFICULT
4. TROUBLE RELAXING: MORE THAN HALF THE DAYS
2. NOT BEING ABLE TO STOP OR CONTROL WORRYING: MORE THAN HALF THE DAYS
5. BEING SO RESTLESS THAT IT IS HARD TO SIT STILL: SEVERAL DAYS
6. BECOMING EASILY ANNOYED OR IRRITABLE: SEVERAL DAYS

## 2024-02-07 ASSESSMENT — PATIENT HEALTH QUESTIONNAIRE - PHQ9
7. TROUBLE CONCENTRATING ON THINGS, SUCH AS READING THE NEWSPAPER OR WATCHING TELEVISION: NEARLY EVERY DAY
1. LITTLE INTEREST OR PLEASURE IN DOING THINGS: MORE THAN HALF THE DAYS
5. POOR APPETITE OR OVEREATING: MORE THAN HALF THE DAYS
6. FEELING BAD ABOUT YOURSELF - OR THAT YOU ARE A FAILURE OR HAVE LET YOURSELF OR YOUR FAMILY DOWN: MORE THAN HALF THE DAYS
10. IF YOU CHECKED OFF ANY PROBLEMS, HOW DIFFICULT HAVE THESE PROBLEMS MADE IT FOR YOU TO DO YOUR WORK, TAKE CARE OF THINGS AT HOME, OR GET ALONG WITH OTHER PEOPLE: VERY DIFFICULT
3. TROUBLE FALLING OR STAYING ASLEEP OR SLEEPING TOO MUCH: NEARLY EVERY DAY
8. MOVING OR SPEAKING SO SLOWLY THAT OTHER PEOPLE COULD HAVE NOTICED. OR THE OPPOSITE, BEING SO FIGETY OR RESTLESS THAT YOU HAVE BEEN MOVING AROUND A LOT MORE THAN USUAL: NOT AT ALL
4. FEELING TIRED OR HAVING LITTLE ENERGY: MORE THAN HALF THE DAYS
9. THOUGHTS THAT YOU WOULD BE BETTER OFF DEAD, OR OF HURTING YOURSELF: NOT AT ALL
2. FEELING DOWN, DEPRESSED OR HOPELESS: NEARLY EVERY DAY

## 2024-02-07 ASSESSMENT — ENCOUNTER SYMPTOMS: MUSCULOSKELETAL NEGATIVE: 1

## 2024-02-22 ENCOUNTER — TELEPHONE (OUTPATIENT)
Dept: BEHAVIORAL HEALTH | Facility: CLINIC | Age: 39
End: 2024-02-22
Payer: MEDICAID

## 2024-02-22 DIAGNOSIS — F33.41 RECURRENT MAJOR DEPRESSIVE DISORDER, IN PARTIAL REMISSION (CMS-HCC): ICD-10-CM

## 2024-02-22 RX ORDER — BUPROPION HYDROCHLORIDE 150 MG/1
150 TABLET ORAL DAILY
Qty: 30 TABLET | Refills: 1 | Status: SHIPPED | OUTPATIENT
Start: 2024-02-22 | End: 2024-05-08 | Stop reason: SDUPTHER

## 2024-03-20 ENCOUNTER — TELEMEDICINE (OUTPATIENT)
Dept: BEHAVIORAL HEALTH | Facility: CLINIC | Age: 39
End: 2024-03-20
Payer: MEDICAID

## 2024-03-20 DIAGNOSIS — F41.1 GAD (GENERALIZED ANXIETY DISORDER): Primary | ICD-10-CM

## 2024-03-20 PROCEDURE — 99214 OFFICE O/P EST MOD 30 MIN: CPT | Performed by: NURSE PRACTITIONER

## 2024-03-20 PROCEDURE — 1036F TOBACCO NON-USER: CPT | Performed by: NURSE PRACTITIONER

## 2024-03-20 RX ORDER — ACETAMINOPHEN 500 MG
2000 TABLET ORAL DAILY
COMMUNITY
Start: 2024-03-15

## 2024-03-20 RX ORDER — MAGNESIUM 200 MG
200 TABLET ORAL NIGHTLY PRN
COMMUNITY
Start: 2023-07-26

## 2024-03-20 RX ORDER — PROPRANOLOL HYDROCHLORIDE 60 MG/1
60 CAPSULE, EXTENDED RELEASE ORAL DAILY
COMMUNITY
Start: 2024-03-20 | End: 2024-05-30 | Stop reason: SINTOL

## 2024-03-20 RX ORDER — BUSPIRONE HYDROCHLORIDE 10 MG/1
10 TABLET ORAL
Qty: 60 TABLET | Refills: 0 | Status: SHIPPED | OUTPATIENT
Start: 2024-03-20 | End: 2024-05-30

## 2024-03-20 ASSESSMENT — ENCOUNTER SYMPTOMS
RESPIRATORY NEGATIVE: 1
CARDIOVASCULAR NEGATIVE: 1
GASTROINTESTINAL NEGATIVE: 1
MUSCULOSKELETAL NEGATIVE: 1

## 2024-03-20 NOTE — PROGRESS NOTES
"Adult Ambulatory Psychiatry Progress Note      Assessment/Plan     Impression:  Nova Kohler is a 38 y.o. female domiciled  in process of , employed as [employer] , who presents for follow up with CC of \"I had to stop the Vraylar because it was making me agitated and depressed. Otherwise not a whole lot. Still in divorce court and Sam doesn't want to come over anymore because his father is giving him a lot of screen time and I don't even have cable so this is just not cool. It is a huge bummer but what can I do? I am going to get him and me into counseling. I called the [facility] to schedule the appointment and you can only leave a message and I have not heard back from anyone. I will just keep plugging away.\"    Plan: Reviewed and agreed to keeping Wellbutrin and stopping Vraylar as patient reports better on Wellbutrin and Vraylar was making her angrier. Adding on mid-day dose of Buspar for anxiety. No other changes to medications or treatment plan. Will be seeing new therapist soon.    Medication: Discontinues Vraylar. Already restarted Wellbutrin XL 150mg every day. Adds on an additional Buspar 10mg at noon. Continues Buspar 30mg twice daily, Cymbalta 60mg every day, Atarax 25mg (up to 75mg) as needed for anxiety management, Lamictal 300mg every day, and Trazodone 50-100mg at bedtime.    Reviewed r/b/a, possible side effects of the medication. Client is aware about the benefit outweighs the risk.     Diagnoses and all orders for this visit:  JESSICA (generalized anxiety disorder)  -     busPIRone (Buspar) 10 mg tablet; Take 1 tablet (10 mg) by mouth once daily at noon. Take before meals..      Therapy: none    Other: n/a    Patient is reminded that if there is SI to call [phone] and get themselves to the closest ED for evaluation, otherwise contact me for other questions/concerns.     Subjective   HPI:  \"Both the patient, and family and caregivers and guardians as appropriate, were " "informed of the current need to conduct treatment via telephone. I have confirmed the patient's identity via the following (minimum of three) acceptable identifiers as per  Policy PH-9: , S.S., home address.\"     Present Illness - anxiety, depression     Characteristics/Recent psychiatric symptoms (pertinent positives and negatives) - reports some worrying about paying bills, as disability is still 'questionable and my  has put in a letter to rush things along, as my housing situation would affect my custody.' Reports her ex is also ready to divorce her as she is and feels it is just the process/expediency of the court systems that is slowing things down. Reports not experiencing renate. Reports her depression however has been worse, 'and it's just me getting used to living alone, and Sam and his not wanting to come over and that is hurtful. I am home a lot, but I did join a class at my [employer] to learn M[employer]. It is slow and low key. Something that is using my brain. It is only once a month but as there are only 5 of us taking the class, and there are other things happening at the [employer] - easter weekend and a food pantry is taking up the other time slot, the 5 of us are taking it in stride. I was pushing myself to go but I have ended up enjoying it.' Reports having a falling out with her best friend so has not gotten out of her home as frequently and because she is doing some [employer] on the side to make extra income. Anxiety has been 'still elevated. It is edgy and can be overwhelming but it is just like I am uncertain if I want to make med changes as I am familiar with how I am feeling right now, in relation to what is going on in my life right now.' Still denies return of VH. Reports sleep is 'troublesome but I do have to get into a sleep study properly so I do get a CPAP.' Sleep is now 'me oversleeping like 12 hours a day and I don't feel refreshed. I do feel the Trazodone is working.' Reports " however during the day she will nap as well for 'a couple of hours'. Energy levels are low and 'maybe more mentally tired but I can say I do feel physically exhausted from the inside out.' Appetite is 'somedays I have no appetite, other days I am nauseous and then other days, I could eat a house.' Reports will eat something even on the day she has no appetite or nauseated (cereal or a freezer/microwaveable meal). Admits to increase in migraines and nausea (was put on migraine medications through [facility] today). Still admits to racing, but 'very obsessive thoughts about a 1000 things. Like bread money and Sam for example.' Attention/focus is 'a mess.'     Onset/timeframe - 4 weeks  Type - anxiety  Duration - daily  Aggravating and/or relieving factors/triggers - settled into new home which has allayed some of her anxiety, but son's behaviors, divorce ongoing, and financial strain does worry her still  Related symptoms  Treatment and treatment changes (new meds, dosage increases or decreases, med compliance, therapy frequency, etc.) (Past and Recent) - Wellbutrin XL 150mg QD, Buspar 30mg BID, Lamictal 300mg QD, Trazodone 50mg @HS, Cymbalta 60mg QD. Atarax 25mg-75mg PRN/TID. Still looking for a new therapist.     Issues: Denies SI/HI/AVH, currently.     VH have stopped even without her having started Vraylar.          Review of Systems   Respiratory: Negative.     Cardiovascular: Negative.    Gastrointestinal: Negative.    Genitourinary: Negative.    Musculoskeletal: Negative.        OARRS:  Blake Gupta, APRN-CNP on 3/21/2024 11:39 PM  I have personally reviewed the OARRS report for Nova Kohler. I have considered the risks of abuse, dependence, addiction and diversion    Is the patient prescribed a combination of a benzodiazepine and opioid?  No    Last Urine Drug Screen / ordered today: No  No results found for this or any previous visit (from the past 8760 hour(s)).    N/A    Clinical rationale for not  completing a Urine Drug Screen: Not ordered since stimulants were discontinued in May 2023.      Controlled Substance Agreement:  Date of the Last Agreement: N/A    Objective   Mental Status Exam:  General Appearance: Fairly groomed  Attitude/Behavior: Cooperative, Distracted  Motor: Fidgeting  Speech: Rapid Speech, pressured  Gait/Station: Other:(comment) (sitting in living room, over virtual connection)  Mood: 'stressed'  Affect: Constricted, Flat, Anxious, Congruent with mood and topic of conversation  Thought Process: Perseverative, Flight of ideas  Thought Associations: No loosening of associations  Thought Content: Normal, Other: (comment) (divorce ongoing, financially tight, son's behaviors, worrying about what's next in her life, as she gets back her life as a soon to be single mom and shared custody)  Perception: No perceptual abnormalities noted  Sensorium: Alert and oriented to person, place, time and situation  Insight: Fair  Judgement: Intact  Cognition: Cognitively intact to conversational testing with respect to attention, orientation, fund of knowledge, recent and remote memory, and language  Testing: N/A    JESSICA-7/PHQ-9 scores reviewed: 10, 17 compared to 21,21 reflecting improvement with anxiety and depression.    Current Medications:  Current Outpatient Medications on File Prior to Visit   Medication Sig Dispense Refill    buPROPion XL (Wellbutrin XL) 150 mg 24 hr tablet Take 1 tablet (150 mg) by mouth once daily. Do not crush, chew, or split. 30 tablet 1    busPIRone (Buspar) 30 mg tablet Take 1 tablet (30 mg) by mouth 2 times a day. As directed 180 tablet 3    DULoxetine (Cymbalta) 60 mg DR capsule Take 1 capsule (60 mg) by mouth once daily. 90 capsule 3    hydrOXYzine HCL (Atarax) 25 mg tablet Take 1 tablet (25 mg) by mouth 3 times a day as needed.      lamoTRIgine (LaMICtal) 100 mg tablet Take 3 tablets (300 mg) by mouth once daily.  As Directed Take 1 tablet in the AM and 2 tablets at bedtime.       propranolol LA (Inderal LA) 60 mg 24 hr capsule Take 1 capsule (60 mg) by mouth once daily.      traZODone (Desyrel) 50 mg tablet Take 1-2 tablets ( mg) by mouth as needed at bedtime for sleep. 180 tablet 3    albuterol 90 mcg/actuation inhaler Inhale 2 puffs every 4 hours if needed for wheezing or shortness of breath.      Allergy Relief, loratadine, 10 mg tablet Take 1 tablet (10 mg) by mouth once daily.      BreatheRite MDI Spacer inhaler 1 each once daily as needed (for asthma relief PRN).      cholecalciferol (Vitamin D-3) 50 mcg (2,000 unit) capsule Take 1 capsule (50 mcg) by mouth early in the morning..      hyoscyamine 0.125 mg disintegrating tablet Place 1 tablet (0.125 mg) under the tongue every 4 hours if needed (as needed).      levonorgestrel (Mirena) 21 mcg/24 hours (8 yrs) 52 mg IUD by intrauterine route. Use as directed      magnesium 200 mg tablet Take 1 tablet (200 mg) by mouth as needed at bedtime (sleep aid).      methocarbamol (Robaxin) 750 mg tablet Take by mouth.      ondansetron (Zofran) 4 mg tablet Take 1 tablet (4 mg) by mouth every 8 hours if needed for vomiting or nausea.      ubrogepant (Ubrelvy) 100 mg tablet tablet Take 1 tablet (100 mg) by mouth if needed (migraine).      [DISCONTINUED] cariprazine (Vraylar) 1.5 mg capsule Take 1 capsule (1.5 mg) by mouth once daily. Do not start before February 26, 2024. 30 capsule 1     No current facility-administered medications on file prior to visit.       Lab Review:   No visits with results within 2 Month(s) from this visit.   Latest known visit with results is:   [facility] Encounter on 12/09/2022   Component Date Value    Methylphenidate Urine Qu* 12/09/2022 1,210.8     Ritalinic acid Urine 12/09/2022 >5000.0          Time Spent:    Prep time: 1 min.  Direct patient time: 31 min.  Documentation time: 6 min.  Total time: 38 min.    Next Appointment:  Follow up in about 4 weeks (around 4/17/2024).

## 2024-03-28 NOTE — PROGRESS NOTES
Patient notified that welbutrin was sent to [id number] per her request. Patient reported that she stopped the vrayler because it made her too anxious.  
No

## 2024-04-18 ENCOUNTER — TELEMEDICINE (OUTPATIENT)
Dept: BEHAVIORAL HEALTH | Facility: CLINIC | Age: 39
End: 2024-04-18
Payer: MEDICAID

## 2024-04-18 DIAGNOSIS — F43.12 NIGHTMARES ASSOCIATED WITH CHRONIC POST-TRAUMATIC STRESS DISORDER: ICD-10-CM

## 2024-04-18 DIAGNOSIS — F31.81 BIPOLAR 2 DISORDER (MULTI): ICD-10-CM

## 2024-04-18 DIAGNOSIS — F43.10 PTSD (POST-TRAUMATIC STRESS DISORDER): ICD-10-CM

## 2024-04-18 DIAGNOSIS — F90.2 ATTENTION DEFICIT HYPERACTIVITY DISORDER, COMBINED TYPE: ICD-10-CM

## 2024-04-18 DIAGNOSIS — F42.8 OBSESSIVE THINKING: ICD-10-CM

## 2024-04-18 DIAGNOSIS — G47.9 SLEEP DIFFICULTIES: ICD-10-CM

## 2024-04-18 DIAGNOSIS — F51.5 NIGHTMARES ASSOCIATED WITH CHRONIC POST-TRAUMATIC STRESS DISORDER: ICD-10-CM

## 2024-04-18 DIAGNOSIS — F41.1 GAD (GENERALIZED ANXIETY DISORDER): Primary | ICD-10-CM

## 2024-04-18 PROCEDURE — 99214 OFFICE O/P EST MOD 30 MIN: CPT | Performed by: NURSE PRACTITIONER

## 2024-04-18 RX ORDER — PRAZOSIN HYDROCHLORIDE 1 MG/1
2 CAPSULE ORAL NIGHTLY
Qty: 60 CAPSULE | Refills: 1 | Status: SHIPPED | OUTPATIENT
Start: 2024-04-18 | End: 2024-05-30 | Stop reason: SINTOL

## 2024-04-18 ASSESSMENT — ANXIETY QUESTIONNAIRES
2. NOT BEING ABLE TO STOP OR CONTROL WORRYING: MORE THAN HALF THE DAYS
IF YOU CHECKED OFF ANY PROBLEMS ON THIS QUESTIONNAIRE, HOW DIFFICULT HAVE THESE PROBLEMS MADE IT FOR YOU TO DO YOUR WORK, TAKE CARE OF THINGS AT HOME, OR GET ALONG WITH OTHER PEOPLE: EXTREMELY DIFFICULT
GAD7 TOTAL SCORE: 17
7. FEELING AFRAID AS IF SOMETHING AWFUL MIGHT HAPPEN: MORE THAN HALF THE DAYS
5. BEING SO RESTLESS THAT IT IS HARD TO SIT STILL: NEARLY EVERY DAY
1. FEELING NERVOUS, ANXIOUS, OR ON EDGE: NEARLY EVERY DAY
3. WORRYING TOO MUCH ABOUT DIFFERENT THINGS: MORE THAN HALF THE DAYS
4. TROUBLE RELAXING: NEARLY EVERY DAY
6. BECOMING EASILY ANNOYED OR IRRITABLE: MORE THAN HALF THE DAYS

## 2024-04-18 ASSESSMENT — PATIENT HEALTH QUESTIONNAIRE - PHQ9
5. POOR APPETITE OR OVEREATING: NEARLY EVERY DAY
9. THOUGHTS THAT YOU WOULD BE BETTER OFF DEAD, OR OF HURTING YOURSELF: NOT AT ALL
1. LITTLE INTEREST OR PLEASURE IN DOING THINGS: MORE THAN HALF THE DAYS
8. MOVING OR SPEAKING SO SLOWLY THAT OTHER PEOPLE COULD HAVE NOTICED. OR THE OPPOSITE, BEING SO FIGETY OR RESTLESS THAT YOU HAVE BEEN MOVING AROUND A LOT MORE THAN USUAL: MORE THAN HALF THE DAYS
2. FEELING DOWN, DEPRESSED OR HOPELESS: MORE THAN HALF THE DAYS
10. IF YOU CHECKED OFF ANY PROBLEMS, HOW DIFFICULT HAVE THESE PROBLEMS MADE IT FOR YOU TO DO YOUR WORK, TAKE CARE OF THINGS AT HOME, OR GET ALONG WITH OTHER PEOPLE: VERY DIFFICULT
7. TROUBLE CONCENTRATING ON THINGS, SUCH AS READING THE NEWSPAPER OR WATCHING TELEVISION: NEARLY EVERY DAY
6. FEELING BAD ABOUT YOURSELF - OR THAT YOU ARE A FAILURE OR HAVE LET YOURSELF OR YOUR FAMILY DOWN: MORE THAN HALF THE DAYS
4. FEELING TIRED OR HAVING LITTLE ENERGY: NEARLY EVERY DAY
3. TROUBLE FALLING OR STAYING ASLEEP OR SLEEPING TOO MUCH: NEARLY EVERY DAY

## 2024-04-18 ASSESSMENT — ENCOUNTER SYMPTOMS
RESPIRATORY NEGATIVE: 1
GASTROINTESTINAL NEGATIVE: 1

## 2024-04-18 NOTE — PROGRESS NOTES
"Adult Ambulatory Psychiatry Progress Note      Assessment/Plan     Impression:  Nova Kohler is a 38 y.o. female domiciled  in process of , employed as [employer] , who presents for follow up with CC of \"I'm bad with not taking the mid-day dose of the Buspar. I just let it go, though I am doing pretty good.  I am making sure to get outside as often as possible. Sam is starting soccer and meeting other parents at the games so that is nice.\"    Plan: Patient reports overall feeling stable but anxiety and depression are there due to current situation - rent won't be paid after May by agency she is using to help for her rent, and worries about income, while waiting for divorce to finally occur. Reports nightmares are back and agreed to adding on Prazosin. Admits could not remember to continue taking mid-day dose of Buspar so gave up. Agreed to adding the extra dose of Buspar to AM or PM dose going forward. No other changes to tx plan.    Medication: Starts taking Prazosin 2mg every night. Wellbutrin XL 150mg every day. Adds on an additional Buspar to current 30mg either in the AM or PM. Continues Buspar 30mg twice daily, Cymbalta 60mg every day, Atarax 25mg (up to 75mg) as needed for anxiety management, Lamictal 300mg every day, and Trazodone 50-100mg at bedtime.    Reviewed r/b/a, possible side effects of the medication. Client is aware about the benefit outweighs the risk.     Diagnoses and all orders for this visit:  JESSICA (generalized anxiety disorder)  Sleep difficulties  -     prazosin (Minipress) 1 mg capsule; Take 2 capsules (2 mg) by mouth once daily at bedtime.  Nightmares associated with chronic post-traumatic stress disorder  -     prazosin (Minipress) 1 mg capsule; Take 2 capsules (2 mg) by mouth once daily at bedtime.  Attention deficit hyperactivity disorder, combined type  Bipolar 2 disorder (Multi)  Obsessive thinking  PTSD (post-traumatic stress disorder)        Therapy: " "none    Other: n/a    Patient is reminded that if there is SI to call [phone] and get themselves to the closest ED for evaluation, otherwise contact me for other questions/concerns.     Subjective   HPI:  \"Both the patient, and family and caregivers and guardians as appropriate, were informed of the current need to conduct treatment via telephone. I have confirmed the patient's identity via the following (minimum of three) acceptable identifiers as per  Policy PH-9: , S.S., home address.\"     Present Illness - anxiety, depression     Characteristics/Recent psychiatric symptoms (pertinent positives and negatives) - reports increased worrying with now having to rely on herself - no longer having to rely on her parents or a partner (soon to be ex), as the divorce is soon to be resolved (she and her  are going to sit down alone together next Tuesday and discuss how to settle things and then submit to their attorneys respectively their decision). Reports having to worry about income by doing [employer] (,  and sometimes ) and eventually palimony from her ex is on her mind, as she has to pay the bills, and does worry about how to pay her rent starting  as [facility] has been assisting with paying for her rent currently through May. Reports even though overall feels her mood/mental health is 'better, I know that a lot of my anxiety and depression is still there because I am putting a lot of stress on myself with my own situation and not sure of how this going to resolve itself.' Reports her ex has started to be more strict with not allowing their son access to electronics at his place, feels a change in her son's behaviors as a result towards her. Reports not experiencing renate, 'as I feel there is renate to some degree but it's more anxiety for sure.' Reports having to put on hold her learning Hungarian at [employer], d/t her son's soccer practice is at the same time, and that takes " precedence over the learning the language. Anxiety remains 'elevated. It is very edgy and can be overwhelming but it is just like I am uncertain of what is going on in my life right now.' Still denies return of VH. Reports sleep is 'me restless at night, but I am now taking naps during the day. I will take a couple solid hours of 1.5-2 hours of sleep.' Reports falling asleep can take up to 45 minutes but once the Trazodone 'kicks in, I get 7-10 hrs.' Wakes up 'incredibly tense. Neck muscles, everything hurting. I have dreams of ending up in Dominik's house... fully clothed but in his bed and he won't let me leave.' Energy levels are low and both mentally and physically fatigued bad. Admits to overeating. Admits to ongoing if not increased intensity, frequency in migraines and nausea, mostly in the afternoons and had to rely more on the new medication, but insurance will only pay for 10 tablets a month. Still admits to racing, and 'still very obsessive thoughts about a 1000 things every day.' Attention/focus is 'a mess.'     Onset/timeframe - 4 weeks  Type - anxiety  Duration - daily  Aggravating and/or relieving factors/triggers - hoping for divorce to finally get settled soon, worrying about money, rent as the agency that is paying for rent won't be able to after May.  Treatment and treatment changes (new meds, dosage increases or decreases, med compliance, therapy frequency, etc.) (Past and Recent) - Wellbutrin XL 150mg QD, Buspar 30mg BID, Lamictal 300mg QD, Trazodone 50mg @HS, Cymbalta 60mg QD. Atarax 25mg-75mg PRN/TID. Still looking for a new therapist.     Issues: Denies SI/HI/AVH, currently.     For migraines, neuromuscular doctors are looking into possibly having patient get Botox IM as Ubrevly is only covered for 10 pills a month.          Review of Systems   HENT: Negative.     Respiratory: Negative.     Gastrointestinal: Negative.    Psychiatric/Behavioral:  Positive for sleep disturbance. The patient is  nervous/anxious.        OARRS:  Blake Gupat, APRN-CNP on 4/22/2024 12:01 AM  I have personally reviewed the OARRS report for Nova Kohler. I have considered the risks of abuse, dependence, addiction and diversion    Is the patient prescribed a combination of a benzodiazepine and opioid?  No    Last Urine Drug Screen / ordered today: No  No results found for this or any previous visit (from the past 8760 hour(s)).    N/A    Clinical rationale for not completing a Urine Drug Screen: Not ordered since stimulants were discontinued in May 2023.      Controlled Substance Agreement:  Date of the Last Agreement: N/A    Objective   Mental Status Exam:  General Appearance: Well groomed, appropriate eye contact  Attitude/Behavior: Cooperative, Distracted  Motor: No psychomotor agitation or retardation, no tremor or other abnormal movements  Speech: Rapid Speech, pressured  Gait/Station: Other:(comment) (sitting in living room, over virtual connection)  Mood: 'not bad but anxious'  Affect: Constricted, Anxious, Congruent with mood and topic of conversation  Thought Process: Linear, goal directed, Perseverative, Thought blocking (obsessive, racing)  Thought Associations: No loosening of associations  Thought Content: Other: (comment) (obsessing and worrying about how she will be able to continue to pay for her new place after May, the end of the divorce finally coming to an end, and where she will have income)  Perception: No perceptual abnormalities noted  Sensorium: Alert and oriented to person, place, time and situation  Insight: Fair  Judgement: Intact  Cognition: Cognitively intact to conversational testing with respect to attention, orientation, fund of knowledge, recent and remote memory, and language  Testing: N/A    JESSICA-7/PHQ-9 scores reviewed: 17, 20 compared to 10, 17 reflecting increases with anxiety and depression.    Current Medications:  Current Outpatient Medications on File Prior to Visit   Medication  Sig Dispense Refill    albuterol 90 mcg/actuation inhaler Inhale 2 puffs every 4 hours if needed for wheezing or shortness of breath.      Allergy Relief, loratadine, 10 mg tablet Take 1 tablet (10 mg) by mouth if needed for allergies.      BreatheRite MDI Spacer inhaler 1 each once daily as needed (for asthma relief PRN).      buPROPion XL (Wellbutrin XL) 150 mg 24 hr tablet Take 1 tablet (150 mg) by mouth once daily. Do not crush, chew, or split. 30 tablet 1    busPIRone (Buspar) 10 mg tablet Take 1 tablet (10 mg) by mouth once daily at noon. Take before meals.. 60 tablet 0    busPIRone (Buspar) 30 mg tablet Take 1 tablet (30 mg) by mouth 2 times a day. As directed 180 tablet 3    cholecalciferol (Vitamin D-3) 50 mcg (2,000 unit) capsule Take 1 capsule (50 mcg) by mouth early in the morning..      DULoxetine (Cymbalta) 60 mg DR capsule Take 1 capsule (60 mg) by mouth once daily. 90 capsule 3    hydrOXYzine HCL (Atarax) 25 mg tablet Take 1 tablet (25 mg) by mouth 3 times a day as needed.      hyoscyamine 0.125 mg disintegrating tablet Place 1 tablet (0.125 mg) under the tongue every 4 hours if needed (as needed).      lamoTRIgine (LaMICtal) 100 mg tablet Take 3 tablets (300 mg) by mouth once daily.  As Directed Take 1 tablet in the AM and 2 tablets at bedtime.      levonorgestrel (Mirena) 21 mcg/24 hours (8 yrs) 52 mg IUD by intrauterine route. Use as directed      magnesium 200 mg tablet Take 1 tablet (200 mg) by mouth as needed at bedtime (sleep aid).      methocarbamol (Robaxin) 750 mg tablet Take by mouth.      ondansetron (Zofran) 4 mg tablet Take 1 tablet (4 mg) by mouth every 8 hours if needed for vomiting or nausea.      traZODone (Desyrel) 50 mg tablet Take 1-2 tablets ( mg) by mouth as needed at bedtime for sleep. (Patient taking differently: Take 1-2 tablets ( mg) by mouth once daily at bedtime.) 180 tablet 3    [] ubrogepant (Ubrelvy) 100 mg tablet tablet Take 1 tablet (100 mg) by  mouth if needed (migraine).      propranolol LA (Inderal LA) 60 mg 24 hr capsule Take 1 capsule (60 mg) by mouth once daily.       No current facility-administered medications on file prior to visit.       Lab Review:   No visits with results within 2 Month(s) from this visit.   Latest known visit with results is:   Legacy Encounter on 12/09/2022   Component Date Value    Methylphenidate Urine Qu* 12/09/2022 1,210.8     Ritalinic acid Urine 12/09/2022 >5000.0          Time Spent:    Prep time: 1 min.  Direct patient time: 28 min.  Documentation time: 7 min.  Total time: 36 min.    Next Appointment:  Follow up in about 6 weeks (around 5/30/2024).

## 2024-04-22 PROBLEM — F43.12 NIGHTMARES ASSOCIATED WITH CHRONIC POST-TRAUMATIC STRESS DISORDER: Status: ACTIVE | Noted: 2024-04-22

## 2024-04-22 PROBLEM — F51.5 NIGHTMARES ASSOCIATED WITH CHRONIC POST-TRAUMATIC STRESS DISORDER: Status: ACTIVE | Noted: 2024-04-22

## 2024-04-22 ASSESSMENT — ENCOUNTER SYMPTOMS
NERVOUS/ANXIOUS: 1
SLEEP DISTURBANCE: 1

## 2024-05-08 DIAGNOSIS — F33.41 RECURRENT MAJOR DEPRESSIVE DISORDER, IN PARTIAL REMISSION (CMS-HCC): Primary | ICD-10-CM

## 2024-05-08 DIAGNOSIS — F31.81 BIPOLAR 2 DISORDER (MULTI): ICD-10-CM

## 2024-05-08 RX ORDER — BUPROPION HYDROCHLORIDE 150 MG/1
150 TABLET ORAL DAILY
Qty: 90 TABLET | Refills: 3 | Status: SHIPPED | OUTPATIENT
Start: 2024-05-08 | End: 2025-05-08

## 2024-05-30 ENCOUNTER — TELEMEDICINE (OUTPATIENT)
Dept: BEHAVIORAL HEALTH | Facility: CLINIC | Age: 39
End: 2024-05-30
Payer: MEDICAID

## 2024-05-30 DIAGNOSIS — F51.5 NIGHTMARES ASSOCIATED WITH CHRONIC POST-TRAUMATIC STRESS DISORDER: ICD-10-CM

## 2024-05-30 DIAGNOSIS — F42.8 OBSESSIVE THINKING: ICD-10-CM

## 2024-05-30 DIAGNOSIS — F41.1 GAD (GENERALIZED ANXIETY DISORDER): ICD-10-CM

## 2024-05-30 DIAGNOSIS — F90.2 ATTENTION DEFICIT HYPERACTIVITY DISORDER, COMBINED TYPE: Primary | ICD-10-CM

## 2024-05-30 DIAGNOSIS — G47.9 SLEEP DIFFICULTIES: ICD-10-CM

## 2024-05-30 DIAGNOSIS — F43.10 PTSD (POST-TRAUMATIC STRESS DISORDER): ICD-10-CM

## 2024-05-30 DIAGNOSIS — F43.12 NIGHTMARES ASSOCIATED WITH CHRONIC POST-TRAUMATIC STRESS DISORDER: ICD-10-CM

## 2024-05-30 DIAGNOSIS — F31.81 BIPOLAR 2 DISORDER (MULTI): ICD-10-CM

## 2024-05-30 DIAGNOSIS — F33.2 SEVERE EPISODE OF RECURRENT MAJOR DEPRESSIVE DISORDER, WITHOUT PSYCHOTIC FEATURES (MULTI): ICD-10-CM

## 2024-05-30 PROCEDURE — 1036F TOBACCO NON-USER: CPT | Performed by: NURSE PRACTITIONER

## 2024-05-30 PROCEDURE — 99214 OFFICE O/P EST MOD 30 MIN: CPT | Performed by: NURSE PRACTITIONER

## 2024-05-30 RX ORDER — METHYLPHENIDATE HYDROCHLORIDE 27 MG/1
27 TABLET ORAL EVERY MORNING
Qty: 30 TABLET | Refills: 0 | Status: SHIPPED | OUTPATIENT
Start: 2024-05-30 | End: 2024-06-29

## 2024-05-30 RX ORDER — UBROGEPANT 100 MG/1
100 TABLET ORAL AS NEEDED
COMMUNITY
Start: 2024-05-15

## 2024-05-30 ASSESSMENT — ANXIETY QUESTIONNAIRES
IF YOU CHECKED OFF ANY PROBLEMS ON THIS QUESTIONNAIRE, HOW DIFFICULT HAVE THESE PROBLEMS MADE IT FOR YOU TO DO YOUR WORK, TAKE CARE OF THINGS AT HOME, OR GET ALONG WITH OTHER PEOPLE: EXTREMELY DIFFICULT
5. BEING SO RESTLESS THAT IT IS HARD TO SIT STILL: NEARLY EVERY DAY
4. TROUBLE RELAXING: NEARLY EVERY DAY
GAD7 TOTAL SCORE: 18
6. BECOMING EASILY ANNOYED OR IRRITABLE: MORE THAN HALF THE DAYS
7. FEELING AFRAID AS IF SOMETHING AWFUL MIGHT HAPPEN: MORE THAN HALF THE DAYS
3. WORRYING TOO MUCH ABOUT DIFFERENT THINGS: NEARLY EVERY DAY
1. FEELING NERVOUS, ANXIOUS, OR ON EDGE: NEARLY EVERY DAY
2. NOT BEING ABLE TO STOP OR CONTROL WORRYING: MORE THAN HALF THE DAYS

## 2024-05-30 ASSESSMENT — PATIENT HEALTH QUESTIONNAIRE - PHQ9
2. FEELING DOWN, DEPRESSED OR HOPELESS: NEARLY EVERY DAY
8. MOVING OR SPEAKING SO SLOWLY THAT OTHER PEOPLE COULD HAVE NOTICED. OR THE OPPOSITE, BEING SO FIGETY OR RESTLESS THAT YOU HAVE BEEN MOVING AROUND A LOT MORE THAN USUAL: MORE THAN HALF THE DAYS
4. FEELING TIRED OR HAVING LITTLE ENERGY: NEARLY EVERY DAY
1. LITTLE INTEREST OR PLEASURE IN DOING THINGS: MORE THAN HALF THE DAYS
10. IF YOU CHECKED OFF ANY PROBLEMS, HOW DIFFICULT HAVE THESE PROBLEMS MADE IT FOR YOU TO DO YOUR WORK, TAKE CARE OF THINGS AT HOME, OR GET ALONG WITH OTHER PEOPLE: EXTREMELY DIFFICULT
3. TROUBLE FALLING OR STAYING ASLEEP OR SLEEPING TOO MUCH: NEARLY EVERY DAY
5. POOR APPETITE OR OVEREATING: NEARLY EVERY DAY
6. FEELING BAD ABOUT YOURSELF - OR THAT YOU ARE A FAILURE OR HAVE LET YOURSELF OR YOUR FAMILY DOWN: NEARLY EVERY DAY
7. TROUBLE CONCENTRATING ON THINGS, SUCH AS READING THE NEWSPAPER OR WATCHING TELEVISION: NEARLY EVERY DAY
9. THOUGHTS THAT YOU WOULD BE BETTER OFF DEAD, OR OF HURTING YOURSELF: NOT AT ALL

## 2024-05-30 ASSESSMENT — ENCOUNTER SYMPTOMS
CONSTITUTIONAL NEGATIVE: 1
SLEEP DISTURBANCE: 1
NERVOUS/ANXIOUS: 1
DYSPHORIC MOOD: 1

## 2024-05-30 NOTE — PROGRESS NOTES
"Adult Ambulatory Psychiatry Progress Note      Assessment/Plan     Impression:  Nova Kohler is a 38 y.o. female domiciled  in process of , employed as [employer] , who presents for follow up with CC of \"I'm doing alright. I think I am pretty good with my medicine. Everything is ok, so my  who works with the domestic violence network is aware of a program that helps with rental assistance but as she is on vacation until June 3rd, I will have to wait until she gets back.\"    Plan: Patient was cooperative but appeared more anxious and down, revealing how her ex is putting their son in between them as she wants her son to have better education but her  is manipulating her son to choose who is the favorite parent at the expense of his future, which causes friction and attitude by her son with her. Admits to struggling with focus issues and after reviewing, agreed to restarting Concerta but a a lower dose but to also help offset depression and lower energy levels. Discontinued Prazosin as it made nightmares worse and Propranolol due to leaving her feeling nauseated.  No other changes to medications or treatment plan. Ordering urine test for drug screening and Methylphenidate and having patient come in person at next appt to sign CSA.    Medication: Restarts Methylphenidate ER 27mg every day. Stops Prazosin and Propranolol. Wellbutrin XL 150mg every day. Additional Buspar 10mg to be taken in the PM. Continues Buspar 30mg twice daily, Cymbalta 60mg every day, Atarax 25mg (up to 75mg) as needed for anxiety management, Lamictal 300mg every day, and Trazodone 50-100mg at bedtime.    Reviewed r/b/a, possible side effects of the medication. Client is aware about the benefit outweighs the risk.     Diagnoses and all orders for this visit:  Attention deficit hyperactivity disorder, combined type  -     methylphenidate ER (CONCERTA) 27 mg oral extended release tablet; Take 1 tablet (27 mg) " "by mouth once daily in the morning. Do not crush, chew, or split.  -     Drug Screen, Urine With Reflex to Confirmation; Future  -     Methylphenidate And Metabolite,Conf,Urine; Future  Severe episode of recurrent major depressive disorder, without psychotic features (Multi)  -     methylphenidate ER (CONCERTA) 27 mg oral extended release tablet; Take 1 tablet (27 mg) by mouth once daily in the morning. Do not crush, chew, or split.  Bipolar 2 disorder (Multi)  Obsessive thinking  JESSICA (generalized anxiety disorder)  PTSD (post-traumatic stress disorder)  Sleep difficulties  Nightmares associated with chronic post-traumatic stress disorder      Therapy: none    Other: n/a    Patient is reminded that if there is SI to call [phone] and get themselves to the closest ED for evaluation, otherwise contact me for other questions/concerns.     Subjective   HPI:  \"Both the patient, and family and caregivers and guardians as appropriate, were informed of the current need to conduct treatment via telephone. I have confirmed the patient's identity via the following (minimum of three) acceptable identifiers as per  Policy PH-9: , [place]., home address.\"     Virtual or Telephone Consent    An interactive audio and video telecommunication system which permits real time communications between the patient (at the originating site) and provider (at the distant site) was utilized to provide this telehealth service.   Verbal consent was requested and obtained from Nova Kohler on this date, 24 for a telehealth visit.     Present Illness - anxiety, depression     Characteristics/Recent psychiatric symptoms (pertinent positives and negatives) - reports issues with her son, as she wants her son to go to a [employer] prep school, because he is being 'bullied to death in his current school and I went to that school when I grew up'. Reports her [id number]- was on board about this idea, but as they had been " "discussing resolving their divorce mostly without involving their  directly and only submitting the paperwork to them in the end to be filed, she explained to him that she ran this agreement for their son's education by her , and her ex got upset with her for doing this and now refuses to allow their son to go and get better education and not be bullied. \"I feel he is doing this because he is not in control of the situation and he would not have direct supervision over our son's education. It would be shared.\" Reports her overall mood/depression and anxiety are stable, but her son's behaviors - being 'super rodriguez and he can keep his puberty stuff to himself. He is mouthy, disrespectful, and back talks to me when I ask him to do something.' Revealed her son actually stole $220 from her ex by buying [employer] add-ons for the game on his phone as his father's credit card was tied to the game and he went ahead and didn't care for the repercussions. Her ex was 'livid' with him but as she wanted her son to pay for the poor decisions with no electronics for a month, and mow lawns as part of his consequences, her ex gave back his shola system early as 'he wants to be the cool parent.' Reports her ex is not supervising or enforcing proper rules/behaviors - Sam (their son) is not brushing his teeth or don't shower, or 'if we let Sam do what he wants which are not good choices like he doesn't want to go to the better school, we have to talk about making sure he gets the better education.' Reports loves her son yet is very upset and frustrated because her ex (still in divorce proceedings) won't support her, and often just pits their son against her, 'and I worry he will become another Dominik. I am doing my best to make sure he doesn't become him.' Reports anxiety is also heightened but 'manageable' with waiting for answers from her  about assistance to pay for her rent. Reports sleep averages 10-11 " hours, and 'feels best when I get 8-9 hours, but I am having the nightmares though not as severe, when I was on the Prazosin (tried it for a week, and felt like she had the flu and was dizzy, nauseated, emesis along with increased intensity of nightmares, and stopped). Wakes up 'still incredibly tense. Neck muscles, everything hurting but with physical therapy and dry needling helps me relax through the day. It's only at night that I tense up.' Energy levels are low by noon and both mentally and physically fatigued all the time. Admits to overeating 'because of me emotionally eating.' Admits to ongoing if not increased intensity, frequency in migraines and nausea, mostly in the afternoons and had to rely more on the new medication, but insurance will only pay for 10 tablets a month but it does work 'like a charm'. Still admits to racing, and 'still very obsessive thoughts about a 1000 things every day.' Attention/focus is 'a mess. I stopped there was a tug of war with taking a stimulant and the Trazodone and that was why I wanted to stop it.'     Onset/timeframe - 4 weeks  Type - anxiety, depression  Duration - daily  Aggravating and/or relieving factors/triggers - hoping for divorce to finally get settled soon, struggling and worrying for her son's emotional/academic future as her ex is putting him in between them as a wedge and making him pick which one is the favorite parent which is self of her ex and that just upsets her more because this should be about the wellbeing of their son.  Treatment and treatment changes (new meds, dosage increases or decreases, med compliance, therapy frequency, etc.) (Past and Recent) - Wellbutrin XL 150mg QD, Buspar 30mg BID, Lamictal 300mg QD, Trazodone 50mg @HS, Cymbalta 60mg QD. Atarax 25mg-75mg PRN/TID. Still looking for a new therapist.     Issues: Denies SI/HI/AVH, currently.     For migraines, neuromuscular doctors are looking into possibly having patient get Botox IM as  Ubrevly is only covered for 10 pills a month.          Review of Systems   Constitutional: Negative.    HENT: Negative.     Psychiatric/Behavioral:  Positive for dysphoric mood and sleep disturbance. The patient is nervous/anxious.        OARRS:  Blake Gupta, APRN-CNP on 5/30/2024  9:10 PM  I have personally reviewed the OARRS report for Nova Kohler. I have considered the risks of abuse, dependence, addiction and diversion    Is the patient prescribed a combination of a benzodiazepine and opioid?  No    Last Urine Drug Screen / ordered today: YES  No results found for this or any previous visit (from the past 8760 hour(s)).    N/A    Clinical rationale for not completing a Urine Drug Screen: Reordered today    Controlled Substance Agreement:  Date of the Last Agreement: 05/30/2024    I attest to having ordered seen the CSA and ordering it for the following substance.    Stimulants:   What is the patient's goal of therapy? Improved ADHD, depression, and energy levels.  Is this being achieved with current treatment? no    Activities of Daily Living:   Is your overall impression that this patient is benefiting (symptom reduction outweighs side effects) from stimulant therapy? No     1. Physical Functioning: Worse  2. Family Relationship: Worse  3. Social Relationship: Worse  4. Mood: Worse  5. Sleep Patterns: Same  6. Overall Function: Worse      Objective   Mental Status Exam:  General Appearance: Fairly groomed  Attitude/Behavior: Cooperative, Distracted  Motor: No psychomotor agitation or retardation, no tremor or other abnormal movements  Speech: Rapid Speech, pressured  Gait/Station: Other:(comment) (sitting on couch in living room, over virtual connection)  Mood: 'not good'  Affect: Constricted, Anxious, Congruent with mood and topic of conversation  Thought Process: Linear, goal directed, Perseverative (obsessive, racing)  Thought Associations: No loosening of associations  Thought Content: Other:  (comment) (worried about her son being caught in a tug of war between herself & her soon to be ex over his future education and her  being selfish and just his need to be the 'favorite' parent rather than doing the right thing for their son, that upsets her.)  Perception: No perceptual abnormalities noted  Sensorium: Alert and oriented to person, place, time and situation  Insight: Intact  Judgement: Intact  Cognition: Cognitively intact to conversational testing with respect to attention, orientation, fund of knowledge, recent and remote memory, and language  Testing: N/A    JESSICA-7/PHQ-9 scores reviewed: 18, 22 compared to 17, 20 reflecting increases in both anxiety and depressive symptoms.    Current Medications:  Current Outpatient Medications on File Prior to Visit   Medication Sig Dispense Refill    albuterol 90 mcg/actuation inhaler Inhale 2 puffs every 4 hours if needed for wheezing or shortness of breath.      Allergy Relief, loratadine, 10 mg tablet Take 1 tablet (10 mg) by mouth if needed for allergies.      BreatheRite MDI Spacer inhaler 1 each once daily as needed (for asthma relief PRN).      buPROPion XL (Wellbutrin XL) 150 mg 24 hr tablet Take 1 tablet (150 mg) by mouth once daily. Do not crush, chew, or split. 90 tablet 3    busPIRone (Buspar) 10 mg tablet Take 1 tablet (10 mg) by mouth once daily at noon. Take before meals.. (Patient taking differently: Take 1 tablet (10 mg) by mouth at 3:00 in the morning.) 60 tablet 0    busPIRone (Buspar) 30 mg tablet Take 1 tablet (30 mg) by mouth 2 times a day. As directed 180 tablet 3    cholecalciferol (Vitamin D-3) 50 mcg (2,000 unit) capsule Take 1 capsule (50 mcg) by mouth early in the morning..      DULoxetine (Cymbalta) 60 mg DR capsule Take 1 capsule (60 mg) by mouth once daily. 90 capsule 3    hydrOXYzine HCL (Atarax) 25 mg tablet Take 1 tablet (25 mg) by mouth 3 times a day as needed.      hyoscyamine 0.125 mg disintegrating tablet Place 1  tablet (0.125 mg) under the tongue every 4 hours if needed (as needed).      lamoTRIgine (LaMICtal) 100 mg tablet Take 3 tablets (300 mg) by mouth once daily.  As Directed Take 1 tablet in the AM and 2 tablets at bedtime.      levonorgestrel (Mirena) 21 mcg/24 hours (8 yrs) 52 mg IUD by intrauterine route. Use as directed      magnesium 200 mg tablet Take 1 tablet (200 mg) by mouth as needed at bedtime (sleep aid).      methocarbamol (Robaxin) 750 mg tablet Take 1 tablet (750 mg) by mouth 3 times a day. 2-3x daily      ondansetron (Zofran) 4 mg tablet Take 1 tablet (4 mg) by mouth every 8 hours if needed for vomiting or nausea.      traZODone (Desyrel) 50 mg tablet Take 1-2 tablets ( mg) by mouth as needed at bedtime for sleep. (Patient taking differently: Take 1-2 tablets ( mg) by mouth once daily at bedtime.) 180 tablet 3    Ubrelvy 100 mg tablet tablet Take 1 tablet (100 mg) by mouth if needed (migraine).      [DISCONTINUED] prazosin (Minipress) 1 mg capsule Take 2 capsules (2 mg) by mouth once daily at bedtime. (Patient not taking: Reported on 5/30/2024) 60 capsule 1    [DISCONTINUED] propranolol LA (Inderal LA) 60 mg 24 hr capsule Take 1 capsule (60 mg) by mouth once daily.       No current facility-administered medications on file prior to visit.       Lab Review:   No visits with results within 2 Month(s) from this visit.   Latest known visit with results is:   Legacy Encounter on 12/09/2022   Component Date Value    Methylphenidate Urine Qu* 12/09/2022 1,210.8     Ritalinic acid Urine 12/09/2022 >5000.0          Time Spent:    Prep time: 1 min.  Direct patient time: 31 min.  Documentation time: 7 min.  Total time: 39 min.    Next Appointment:  Follow up in about 7 weeks (around 7/18/2024).

## 2024-06-14 ENCOUNTER — PATIENT MESSAGE (OUTPATIENT)
Dept: BEHAVIORAL HEALTH | Facility: CLINIC | Age: 39
End: 2024-06-14
Payer: MEDICAID

## 2024-06-14 DIAGNOSIS — F41.1 GAD (GENERALIZED ANXIETY DISORDER): ICD-10-CM

## 2024-06-14 RX ORDER — BUSPIRONE HYDROCHLORIDE 10 MG/1
10 TABLET ORAL
Qty: 30 TABLET | Refills: 0 | Status: SHIPPED | OUTPATIENT
Start: 2024-06-14 | End: 2024-07-14

## 2024-06-27 ENCOUNTER — LAB (OUTPATIENT)
Dept: LAB | Facility: LAB | Age: 39
End: 2024-06-27
Payer: MEDICAID

## 2024-06-27 DIAGNOSIS — F90.2 ATTENTION DEFICIT HYPERACTIVITY DISORDER, COMBINED TYPE: ICD-10-CM

## 2024-06-27 LAB
AMPHETAMINES UR QL SCN: NORMAL
BARBITURATES UR QL SCN: NORMAL
BENZODIAZ UR QL SCN: NORMAL
BZE UR QL SCN: NORMAL
CANNABINOIDS UR QL SCN: NORMAL
FENTANYL+NORFENTANYL UR QL SCN: NORMAL
METHADONE UR QL SCN: NORMAL
OPIATES UR QL SCN: NORMAL
OXYCODONE+OXYMORPHONE UR QL SCN: NORMAL
PCP UR QL SCN: NORMAL

## 2024-06-27 PROCEDURE — 80360 METHYLPHENIDATE: CPT

## 2024-06-27 PROCEDURE — 80307 DRUG TEST PRSMV CHEM ANLYZR: CPT

## 2024-06-30 LAB
ME-PHENIDATE UR-MCNC: 2643.1 NG/ML
PPAA UR-MCNC: >5000 NG/ML

## 2024-07-19 ENCOUNTER — APPOINTMENT (OUTPATIENT)
Dept: BEHAVIORAL HEALTH | Facility: CLINIC | Age: 39
End: 2024-07-19
Payer: MEDICAID

## 2024-07-19 VITALS
SYSTOLIC BLOOD PRESSURE: 135 MMHG | HEIGHT: 65 IN | WEIGHT: 272 LBS | BODY MASS INDEX: 45.32 KG/M2 | HEART RATE: 80 BPM | DIASTOLIC BLOOD PRESSURE: 80 MMHG

## 2024-07-19 DIAGNOSIS — F31.81 BIPOLAR 2 DISORDER (MULTI): ICD-10-CM

## 2024-07-19 DIAGNOSIS — F42.8 OBSESSIVE THINKING: ICD-10-CM

## 2024-07-19 DIAGNOSIS — F43.10 PTSD (POST-TRAUMATIC STRESS DISORDER): ICD-10-CM

## 2024-07-19 DIAGNOSIS — F90.2 ATTENTION DEFICIT HYPERACTIVITY DISORDER, COMBINED TYPE: ICD-10-CM

## 2024-07-19 DIAGNOSIS — F43.12 NIGHTMARES ASSOCIATED WITH CHRONIC POST-TRAUMATIC STRESS DISORDER: ICD-10-CM

## 2024-07-19 DIAGNOSIS — G47.9 SLEEP DIFFICULTIES: ICD-10-CM

## 2024-07-19 DIAGNOSIS — F51.5 NIGHTMARES ASSOCIATED WITH CHRONIC POST-TRAUMATIC STRESS DISORDER: ICD-10-CM

## 2024-07-19 DIAGNOSIS — F41.1 GAD (GENERALIZED ANXIETY DISORDER): Primary | ICD-10-CM

## 2024-07-19 PROCEDURE — 3008F BODY MASS INDEX DOCD: CPT | Performed by: NURSE PRACTITIONER

## 2024-07-19 PROCEDURE — 99214 OFFICE O/P EST MOD 30 MIN: CPT | Performed by: NURSE PRACTITIONER

## 2024-07-19 RX ORDER — METHYLPHENIDATE HYDROCHLORIDE 10 MG/1
10 TABLET ORAL 2 TIMES DAILY
Qty: 60 TABLET | Refills: 0 | Status: SHIPPED | OUTPATIENT
Start: 2024-07-19 | End: 2024-08-18

## 2024-07-19 ASSESSMENT — ANXIETY QUESTIONNAIRES
2. NOT BEING ABLE TO STOP OR CONTROL WORRYING: NEARLY EVERY DAY
1. FEELING NERVOUS, ANXIOUS, OR ON EDGE: NEARLY EVERY DAY
7. FEELING AFRAID AS IF SOMETHING AWFUL MIGHT HAPPEN: MORE THAN HALF THE DAYS
5. BEING SO RESTLESS THAT IT IS HARD TO SIT STILL: NEARLY EVERY DAY
4. TROUBLE RELAXING: NEARLY EVERY DAY
6. BECOMING EASILY ANNOYED OR IRRITABLE: NEARLY EVERY DAY
IF YOU CHECKED OFF ANY PROBLEMS ON THIS QUESTIONNAIRE, HOW DIFFICULT HAVE THESE PROBLEMS MADE IT FOR YOU TO DO YOUR WORK, TAKE CARE OF THINGS AT HOME, OR GET ALONG WITH OTHER PEOPLE: EXTREMELY DIFFICULT
3. WORRYING TOO MUCH ABOUT DIFFERENT THINGS: MORE THAN HALF THE DAYS
GAD7 TOTAL SCORE: 19

## 2024-07-19 ASSESSMENT — PATIENT HEALTH QUESTIONNAIRE - PHQ9
5. POOR APPETITE OR OVEREATING: NEARLY EVERY DAY
8. MOVING OR SPEAKING SO SLOWLY THAT OTHER PEOPLE COULD HAVE NOTICED. OR THE OPPOSITE, BEING SO FIGETY OR RESTLESS THAT YOU HAVE BEEN MOVING AROUND A LOT MORE THAN USUAL: NEARLY EVERY DAY
9. THOUGHTS THAT YOU WOULD BE BETTER OFF DEAD, OR OF HURTING YOURSELF: NOT AT ALL
1. LITTLE INTEREST OR PLEASURE IN DOING THINGS: NEARLY EVERY DAY
6. FEELING BAD ABOUT YOURSELF - OR THAT YOU ARE A FAILURE OR HAVE LET YOURSELF OR YOUR FAMILY DOWN: MORE THAN HALF THE DAYS
2. FEELING DOWN, DEPRESSED OR HOPELESS: NEARLY EVERY DAY
4. FEELING TIRED OR HAVING LITTLE ENERGY: NEARLY EVERY DAY
10. IF YOU CHECKED OFF ANY PROBLEMS, HOW DIFFICULT HAVE THESE PROBLEMS MADE IT FOR YOU TO DO YOUR WORK, TAKE CARE OF THINGS AT HOME, OR GET ALONG WITH OTHER PEOPLE: EXTREMELY DIFFICULT
3. TROUBLE FALLING OR STAYING ASLEEP OR SLEEPING TOO MUCH: NEARLY EVERY DAY
7. TROUBLE CONCENTRATING ON THINGS, SUCH AS READING THE NEWSPAPER OR WATCHING TELEVISION: NEARLY EVERY DAY

## 2024-07-19 ASSESSMENT — ENCOUNTER SYMPTOMS
SLEEP DISTURBANCE: 1
CONSTITUTIONAL NEGATIVE: 1
DYSPHORIC MOOD: 1
NERVOUS/ANXIOUS: 1

## 2024-07-19 NOTE — PROGRESS NOTES
"Adult Ambulatory Psychiatry Progress Note      Assessment/Plan     Impression:  Nova Kohler is a 38 y.o. female domiciled  in process of , employed as [employer] , who presents for follow up with CC of \"Sam is going to make sure I am in a straitjacket (joking). He is really being defiant. His primary doctor took over his meds and will get him back into psych and he is seeing a therapist that he likes, thankfully. I have been tentatively approved for Section 8, so I have an interview for [employer] in early August but also sign up for [employer] as well, so hoping I can do that and transfer over, but others whom mess up Section 8 housing for the rest of us who do need it, because many places do not take it, is the issue here. And being back on Concerta, the first week I was so sick on it, where I was having diarrhea all the time and cramps, so maybe getting a probiotic would help.\"    Plan:  Patient was cooperative and engaging but nervous. Dealing with her son's behaviors has gotten her more on edge as of late, especially his being defiant and rude towards her. However feels that knowing her stressors/triggers and being on medications is still keeping her stable even if she is identifying anxiety and depression, as without them, she would be mentally unstable. Found Concerta to help with attention issues but left her with constant diarrhea and major GI upset. Reviewed and agreed to switching to Ritalin twice instead of Concerta long acting and see if the IR is gentler on her GI system. No other changes to medications or treatment plan. Labs reviewed - no concerns. Patient signed CSA.    Medication: Switching Methylphenidate (Concerta) ER 27mg every day to Focalin 10mg twice daily. Wellbutrin XL 150mg every day. Buspar 10mg to be taken middle of the day, late afternoon. Continues Buspar 30mg twice daily, Cymbalta 60mg every day, Atarax 25mg (up to 75mg) as needed for anxiety management, " Lamictal 300mg every day, and Trazodone 50-100mg at bedtime.    Reviewed r/b/a, possible side effects of the medication. Client is aware about the benefit outweighs the risk.     Diagnoses and all orders for this visit:  JESSICA (generalized anxiety disorder)  -     busPIRone (Buspar) 10 mg tablet; Take 1 tablet (10 mg) by mouth once daily at noon. Take before meals..  Attention deficit hyperactivity disorder, combined type  -     methylphenidate (Ritalin) 10 mg tablet; Take 1 tablet (10 mg) by mouth 2 times a day.  Bipolar 2 disorder (Multi)  Obsessive thinking  PTSD (post-traumatic stress disorder)  Nightmares associated with chronic post-traumatic stress disorder  Sleep difficulties        Therapy: none    Other: n/a    Patient is reminded that if there is SI to call [phone] and get themselves to the closest ED for evaluation, otherwise contact me for other questions/concerns.     Subjective   HPI:    Present Illness - anxiety, depression     Characteristics/Recent psychiatric symptoms (pertinent positives and negatives) - reports the [employer] school that she wanted her son to be sent to, denied her son's application because of his suspension from school last fall, and that frustrates her because of how kids today and the school that her son goes to act/treat/talk to and treat one another and others in their lives so rudely and that shocks her with how the kids (eg friend of his told him while she asked her son to stop playing a game to ignore her and she heard the other kid say that) treat adults. Reports frustration with his behaviors and her ex's not helping with being strict and enforces similar rules. Reports she feels anxiety and depression are 'always there but the medications help keep them in check honestly. Without them I would be a complete mess.' Denies renate. Reports sleep is messy with trouble falling asleep 45 minutes-1 hour, and interrupted sleep when her son is staying over as his presence  interrupts her, with anxiety, achieving 4-5 hours and 1.5 hours of napping, and when her son is not home, she is sleeping 10-12 hours and no napping instead. Reports energy levels are low all the time, and mentally/physically fatigued 'all the time', 'but I am so fidgety'. Reports Concerta helped with ADHD symptoms but s/e of diarrhea and stomach cramps were troublesome, and once medication ran out, she felt better - wanted to see about going to IR version instead for lessened s/e. Reports appetite is either not there when on Concerta or overeating but unhealthy. Reports racing thoughts are a constant but less obsessive thinking. Denies ruminating as frequently or intrusive. Reports anxiety is still a feeling of worrying/edgy and dread. Divorce is ongoing but as both she and her soon to be ex are both in chapter 13 bankruptcy, that cannot happen but once they switch to chapter 7, they can get the divorce finalized. Denies renate.     Onset/timeframe - 4 weeks  Type - anxiety, sleep, irritability  Duration - daily, situational  Aggravating and/or relieving factors/triggers - dealing with son's behaviors when he visits is irritating and gets her anxious, edgy and interrupts her sleep as she is trying to raise him properly but he is defiant  Treatment and treatment changes (new meds, dosage increases or decreases, med compliance, therapy frequency, etc.) (Past and Recent) - Wellbutrin XL 150mg QD, Buspar 30mg BID, Lamictal 300mg QD, Trazodone 50mg @HS, Cymbalta 60mg QD. Atarax 25mg-75mg PRN/TID. Still looking for a new therapist.     Issues: Denies SI/HI/AVH, currently.     For migraines, neuromuscular doctors are looking into possibly having patient get Botox IM as Ubrevly is only covered for 10 pills a month.           Review of Systems   Constitutional: Negative.    HENT: Negative.     Psychiatric/Behavioral:  Positive for dysphoric mood and sleep disturbance. The patient is nervous/anxious.        OARRS:  Blake VERA  Candy, APRN-CNP on 7/19/2024  9:19 AM  I have personally reviewed the OARRS report for Nova ANDI MakiKohler. I have considered the risks of abuse, dependence, addiction and diversion    Is the patient prescribed a combination of a benzodiazepine and opioid?  No    Last Urine Drug Screen / ordered today: YES  Recent Results (from the past 8760 hour(s))   Methylphenidate And Metabolite,Conf,Urine    Collection Time: 06/27/24 11:25 AM   Result Value Ref Range    Ritalinic acid Urine >5000.0 ng/mL    Methylphenidate Urine Quantitative 2643.1 ng/mL   Drug Screen, Urine With Reflex to Confirmation    Collection Time: 06/27/24 11:25 AM   Result Value Ref Range    Amphetamine Screen, Urine Presumptive Negative Presumptive Negative    Barbiturate Screen, Urine Presumptive Negative Presumptive Negative    Benzodiazepines Screen, Urine Presumptive Negative Presumptive Negative    Cannabinoid Screen, Urine Presumptive Negative Presumptive Negative    Cocaine Metabolite Screen, Urine Presumptive Negative Presumptive Negative    Fentanyl Screen, Urine Presumptive Negative Presumptive Negative    Opiate Screen, Urine Presumptive Negative Presumptive Negative    Oxycodone Screen, Urine Presumptive Negative Presumptive Negative    PCP Screen, Urine Presumptive Negative Presumptive Negative    Methadone Screen, Urine Presumptive Negative Presumptive Negative       N/A    Clinical rationale for not completing a Urine Drug Screen: Reordered today    Controlled Substance Agreement:  Date of the Last Agreement: 05/30/2024    I attest to having ordered seen the CSA and ordering it for the following substance.    Stimulants:   What is the patient's goal of therapy? Improved ADHD, depression, and energy levels.  Is this being achieved with current treatment? yes    Activities of Daily Living:   Is your overall impression that this patient is benefiting (symptom reduction outweighs side effects) from stimulant therapy? yes     1. Physical  Functioning: Better  2. Family Relationship: Same  3. Social Relationship: Same  4. Mood: Same  5. Sleep Patterns: Same  6. Overall Function: Same      Objective   Mental Status Exam:  General Appearance: Well groomed, appropriate eye contact  Attitude/Behavior: Cooperative  Motor: Fidgeting  Speech: Rapid Speech, pressured  Gait/Station: WFL - Within functional limits  Mood: 'frustrated but ok'  Affect: Dysphoric, constricted but reactive, Anxious, Congruent with mood and topic of conversation  Thought Process: Linear, goal directed, Perseverative  Thought Associations: No loosening of associations  Thought Content: Normal, Other: (comment) (dealing with her teenage son's oppositional behaviors is pushing her limits and temper, and balancing getting to end of divorce and getting onto section 8 housing.)  Perception: No perceptual abnormalities noted  Sensorium: Alert and oriented to person, place, time and situation  Insight: Fair  Judgement: Fair  Cognition: Cognitively intact to conversational testing with respect to attention, orientation, fund of knowledge, recent and remote memory, and language  Testing: N/A    JESSICA-7/PHQ-9 scores reviewed: 19, 23 compared to 18, 22 reflecting slight increases in both anxiety and depressive symptoms.    Current Medications:  Current Outpatient Medications on File Prior to Visit   Medication Sig Dispense Refill    albuterol 90 mcg/actuation inhaler Inhale 2 puffs every 4 hours if needed for wheezing or shortness of breath.      Allergy Relief, loratadine, 10 mg tablet Take 1 tablet (10 mg) by mouth if needed for allergies.      BreatheRite MDI Spacer inhaler 1 each once daily as needed (for asthma relief PRN).      buPROPion XL (Wellbutrin XL) 150 mg 24 hr tablet Take 1 tablet (150 mg) by mouth once daily. Do not crush, chew, or split. 90 tablet 3    busPIRone (Buspar) 30 mg tablet Take 1 tablet (30 mg) by mouth 2 times a day. As directed 180 tablet 3    cholecalciferol (Vitamin  D-3) 50 mcg (2,000 unit) capsule Take 1 capsule (50 mcg) by mouth early in the morning..      DULoxetine (Cymbalta) 60 mg DR capsule Take 1 capsule (60 mg) by mouth once daily. 90 capsule 3    hydrOXYzine HCL (Atarax) 25 mg tablet Take 1 tablet (25 mg) by mouth 3 times a day as needed.      hyoscyamine 0.125 mg disintegrating tablet Place 1 tablet (0.125 mg) under the tongue every 4 hours if needed (as needed).      lamoTRIgine (LaMICtal) 100 mg tablet Take 3 tablets (300 mg) by mouth once daily.  As Directed Take 1 tablet in the AM and 2 tablets at bedtime.      levonorgestrel (Mirena) 21 mcg/24 hours (8 yrs) 52 mg IUD by intrauterine route. Use as directed      magnesium 200 mg tablet Take 1 tablet (200 mg) by mouth as needed at bedtime (sleep aid).      methocarbamol (Robaxin) 750 mg tablet Take 1 tablet (750 mg) by mouth 3 times a day. 2-3x daily      ondansetron (Zofran) 4 mg tablet Take 1 tablet (4 mg) by mouth every 8 hours if needed for vomiting or nausea.      traZODone (Desyrel) 50 mg tablet Take 1-2 tablets ( mg) by mouth as needed at bedtime for sleep. (Patient taking differently: Take 1-2 tablets ( mg) by mouth once daily at bedtime.) 180 tablet 3    Ubrelvy 100 mg tablet tablet Take 1 tablet (100 mg) by mouth if needed (migraine).      [DISCONTINUED] busPIRone (Buspar) 10 mg tablet Take 1 tablet (10 mg) by mouth once daily at noon. Take before meals.. 30 tablet 0    [DISCONTINUED] methylphenidate ER (CONCERTA) 27 mg oral extended release tablet Take 1 tablet (27 mg) by mouth once daily in the morning. Do not crush, chew, or split. 30 tablet 0     No current facility-administered medications on file prior to visit.       Lab Review:   Lab on 06/27/2024   Component Date Value    Amphetamine Screen, Urine 06/27/2024 Presumptive Negative     Barbiturate Screen, Urine 06/27/2024 Presumptive Negative     Benzodiazepines Screen, * 06/27/2024 Presumptive Negative     Cannabinoid Screen, Urine  06/27/2024 Presumptive Negative     Cocaine Metabolite Scree* 06/27/2024 Presumptive Negative     Fentanyl Screen, Urine 06/27/2024 Presumptive Negative     Opiate Screen, Urine 06/27/2024 Presumptive Negative     Oxycodone Screen, Urine 06/27/2024 Presumptive Negative     PCP Screen, Urine 06/27/2024 Presumptive Negative     Methadone Screen, Urine 06/27/2024 Presumptive Negative     Ritalinic acid Urine 06/27/2024 >5000.0     Methylphenidate Urine Qu* 06/27/2024 2643.1          Time Spent:    Prep time: 1 min.  Direct patient time: 32 min.  Documentation time: 5 min.  Total time: 38 min.    Next Appointment:  Follow up in about 4 weeks (around 8/16/2024).

## 2024-07-20 RX ORDER — BUSPIRONE HYDROCHLORIDE 10 MG/1
10 TABLET ORAL
Qty: 90 TABLET | Refills: 3 | Status: SHIPPED | OUTPATIENT
Start: 2024-07-20 | End: 2025-07-20

## 2024-07-25 ENCOUNTER — HOSPITAL ENCOUNTER (EMERGENCY)
Age: 39
Discharge: HOME | End: 2024-07-25
Attending: EMERGENCY MEDICINE
Payer: MEDICAID

## 2024-07-25 ENCOUNTER — APPOINTMENT (OUTPATIENT)
Dept: CT IMAGING | Age: 39
End: 2024-07-25
Payer: MEDICAID

## 2024-07-25 ENCOUNTER — APPOINTMENT (OUTPATIENT)
Dept: ULTRASOUND IMAGING | Age: 39
End: 2024-07-25
Payer: MEDICAID

## 2024-07-25 VITALS
OXYGEN SATURATION: 100 % | TEMPERATURE: 98.9 F | WEIGHT: 272 LBS | HEART RATE: 68 BPM | RESPIRATION RATE: 16 BRPM | HEIGHT: 65 IN | SYSTOLIC BLOOD PRESSURE: 117 MMHG | DIASTOLIC BLOOD PRESSURE: 74 MMHG | BODY MASS INDEX: 45.32 KG/M2

## 2024-07-25 DIAGNOSIS — R10.9 ABDOMINAL PAIN, UNSPECIFIED ABDOMINAL LOCATION: ICD-10-CM

## 2024-07-25 DIAGNOSIS — N30.00 ACUTE CYSTITIS WITHOUT HEMATURIA: ICD-10-CM

## 2024-07-25 DIAGNOSIS — D25.9 UTERINE LEIOMYOMA, UNSPECIFIED LOCATION: Primary | ICD-10-CM

## 2024-07-25 LAB
ALBUMIN SERPL-MCNC: 4.1 G/DL (ref 3.5–4.6)
ALP SERPL-CCNC: 115 U/L (ref 40–130)
ALT SERPL-CCNC: 20 U/L (ref 0–33)
ANION GAP SERPL CALCULATED.3IONS-SCNC: 10 MEQ/L (ref 9–15)
APTT PPP: 23.5 SEC (ref 24.4–36.8)
AST SERPL-CCNC: 14 U/L (ref 0–35)
BACTERIA URNS QL MICRO: ABNORMAL /HPF
BASOPHILS # BLD: 0.1 K/UL (ref 0–0.2)
BASOPHILS NFR BLD: 0.7 %
BILIRUB SERPL-MCNC: 0.3 MG/DL (ref 0.2–0.7)
BILIRUB UR QL STRIP: NEGATIVE
BUN SERPL-MCNC: 9 MG/DL (ref 6–20)
CALCIUM SERPL-MCNC: 9.1 MG/DL (ref 8.5–9.9)
CHLORIDE SERPL-SCNC: 105 MEQ/L (ref 95–107)
CLARITY UR: ABNORMAL
CO2 SERPL-SCNC: 24 MEQ/L (ref 20–31)
COLOR UR: YELLOW
CREAT SERPL-MCNC: 0.82 MG/DL (ref 0.5–0.9)
EOSINOPHIL # BLD: 0.2 K/UL (ref 0–0.7)
EOSINOPHIL NFR BLD: 2 %
EPI CELLS #/AREA URNS AUTO: ABNORMAL /HPF (ref 0–5)
ERYTHROCYTE [DISTWIDTH] IN BLOOD BY AUTOMATED COUNT: 12.8 % (ref 11.5–14.5)
GLOBULIN SER CALC-MCNC: 2.7 G/DL (ref 2.3–3.5)
GLUCOSE SERPL-MCNC: 103 MG/DL (ref 70–99)
GLUCOSE UR STRIP-MCNC: NEGATIVE MG/DL
HCT VFR BLD AUTO: 40.4 % (ref 37–47)
HGB BLD-MCNC: 13.3 G/DL (ref 12–16)
HGB UR QL STRIP: NEGATIVE
HYALINE CASTS #/AREA URNS AUTO: ABNORMAL /HPF (ref 0–5)
INR PPP: 0.9
KETONES UR STRIP-MCNC: NEGATIVE MG/DL
LEUKOCYTE ESTERASE UR QL STRIP: ABNORMAL
LIPASE SERPL-CCNC: 34 U/L (ref 12–95)
LYMPHOCYTES # BLD: 3.2 K/UL (ref 1–4.8)
LYMPHOCYTES NFR BLD: 27.9 %
MAGNESIUM SERPL-MCNC: 2.1 MG/DL (ref 1.7–2.4)
MCH RBC QN AUTO: 29.5 PG (ref 27–31.3)
MCHC RBC AUTO-ENTMCNC: 32.9 % (ref 33–37)
MCV RBC AUTO: 89.6 FL (ref 79.4–94.8)
MONOCYTES # BLD: 1.2 K/UL (ref 0.2–0.8)
MONOCYTES NFR BLD: 10.1 %
NEUTROPHILS # BLD: 6.8 K/UL (ref 1.4–6.5)
NEUTS SEG NFR BLD: 58.7 %
NITRITE UR QL STRIP: NEGATIVE
PERFORMED ON: ABNORMAL
PH UR STRIP: 5.5 [PH] (ref 5–9)
PLATELET # BLD AUTO: 327 K/UL (ref 130–400)
POC CREATININE WHOLE BLOOD: 0.5
POC CREATININE: 0.5 MG/DL (ref 0.6–1.2)
POC SAMPLE TYPE: ABNORMAL
POTASSIUM SERPL-SCNC: 4 MEQ/L (ref 3.4–4.9)
PROT SERPL-MCNC: 6.8 G/DL (ref 6.3–8)
PROT UR STRIP-MCNC: ABNORMAL MG/DL
PROTHROMBIN TIME: 12.7 SEC (ref 12.3–14.9)
RBC # BLD AUTO: 4.51 M/UL (ref 4.2–5.4)
RBC #/AREA URNS HPF: ABNORMAL /HPF (ref 0–2)
SODIUM SERPL-SCNC: 139 MEQ/L (ref 135–144)
SP GR UR STRIP: 1.02 (ref 1–1.03)
URINE REFLEX TO CULTURE: YES
UROBILINOGEN UR STRIP-ACNC: 0.2 E.U./DL
WBC # BLD AUTO: 11.5 K/UL (ref 4.8–10.8)
WBC #/AREA URNS AUTO: ABNORMAL /HPF (ref 0–5)

## 2024-07-25 PROCEDURE — 87086 URINE CULTURE/COLONY COUNT: CPT

## 2024-07-25 PROCEDURE — 74177 CT ABD & PELVIS W/CONTRAST: CPT

## 2024-07-25 PROCEDURE — 6360000004 HC RX CONTRAST MEDICATION: Performed by: EMERGENCY MEDICINE

## 2024-07-25 PROCEDURE — 76830 TRANSVAGINAL US NON-OB: CPT

## 2024-07-25 PROCEDURE — 96374 THER/PROPH/DIAG INJ IV PUSH: CPT

## 2024-07-25 PROCEDURE — 2580000003 HC RX 258: Performed by: EMERGENCY MEDICINE

## 2024-07-25 PROCEDURE — 83690 ASSAY OF LIPASE: CPT

## 2024-07-25 PROCEDURE — 80053 COMPREHEN METABOLIC PANEL: CPT

## 2024-07-25 PROCEDURE — 85730 THROMBOPLASTIN TIME PARTIAL: CPT

## 2024-07-25 PROCEDURE — 85610 PROTHROMBIN TIME: CPT

## 2024-07-25 PROCEDURE — 96375 TX/PRO/DX INJ NEW DRUG ADDON: CPT

## 2024-07-25 PROCEDURE — 6360000002 HC RX W HCPCS: Performed by: EMERGENCY MEDICINE

## 2024-07-25 PROCEDURE — 36415 COLL VENOUS BLD VENIPUNCTURE: CPT

## 2024-07-25 PROCEDURE — 93975 VASCULAR STUDY: CPT

## 2024-07-25 PROCEDURE — 85025 COMPLETE CBC W/AUTO DIFF WBC: CPT

## 2024-07-25 PROCEDURE — 83735 ASSAY OF MAGNESIUM: CPT

## 2024-07-25 PROCEDURE — 99285 EMERGENCY DEPT VISIT HI MDM: CPT

## 2024-07-25 PROCEDURE — 81001 URINALYSIS AUTO W/SCOPE: CPT

## 2024-07-25 RX ORDER — KETOROLAC TROMETHAMINE 15 MG/ML
15 INJECTION, SOLUTION INTRAMUSCULAR; INTRAVENOUS ONCE
Status: COMPLETED | OUTPATIENT
Start: 2024-07-25 | End: 2024-07-25

## 2024-07-25 RX ORDER — ONDANSETRON 2 MG/ML
4 INJECTION INTRAMUSCULAR; INTRAVENOUS ONCE
Status: COMPLETED | OUTPATIENT
Start: 2024-07-25 | End: 2024-07-25

## 2024-07-25 RX ORDER — 0.9 % SODIUM CHLORIDE 0.9 %
1000 INTRAVENOUS SOLUTION INTRAVENOUS ONCE
Status: COMPLETED | OUTPATIENT
Start: 2024-07-25 | End: 2024-07-25

## 2024-07-25 RX ORDER — NITROFURANTOIN 25; 75 MG/1; MG/1
100 CAPSULE ORAL 2 TIMES DAILY
Qty: 10 CAPSULE | Refills: 0 | Status: SHIPPED | OUTPATIENT
Start: 2024-07-25 | End: 2024-07-30

## 2024-07-25 RX ORDER — ONDANSETRON 4 MG/1
4 TABLET, ORALLY DISINTEGRATING ORAL 3 TIMES DAILY PRN
Qty: 21 TABLET | Refills: 0 | Status: SHIPPED | OUTPATIENT
Start: 2024-07-25

## 2024-07-25 RX ORDER — KETOROLAC TROMETHAMINE 10 MG/1
10 TABLET, FILM COATED ORAL EVERY 6 HOURS PRN
Qty: 20 TABLET | Refills: 0 | Status: SHIPPED | OUTPATIENT
Start: 2024-07-25

## 2024-07-25 RX ORDER — ONDANSETRON 2 MG/ML
4 INJECTION INTRAMUSCULAR; INTRAVENOUS ONCE
Status: DISCONTINUED | OUTPATIENT
Start: 2024-07-25 | End: 2024-07-25 | Stop reason: HOSPADM

## 2024-07-25 RX ORDER — MORPHINE SULFATE 4 MG/ML
6 INJECTION, SOLUTION INTRAMUSCULAR; INTRAVENOUS ONCE
Status: DISCONTINUED | OUTPATIENT
Start: 2024-07-25 | End: 2024-07-25 | Stop reason: HOSPADM

## 2024-07-25 RX ADMIN — ONDANSETRON 4 MG: 2 INJECTION INTRAMUSCULAR; INTRAVENOUS at 06:15

## 2024-07-25 RX ADMIN — KETOROLAC TROMETHAMINE 15 MG: 15 INJECTION, SOLUTION INTRAMUSCULAR; INTRAVENOUS at 06:17

## 2024-07-25 RX ADMIN — SODIUM CHLORIDE 1000 ML: 9 INJECTION, SOLUTION INTRAVENOUS at 04:42

## 2024-07-25 RX ADMIN — IOPAMIDOL 75 ML: 755 INJECTION, SOLUTION INTRAVENOUS at 04:54

## 2024-07-25 ASSESSMENT — ENCOUNTER SYMPTOMS
BACK PAIN: 0
VOMITING: 0
SHORTNESS OF BREATH: 0
ABDOMINAL PAIN: 1

## 2024-07-25 NOTE — ED PROVIDER NOTES
Significance not   clear, there could be a collapsed lesion which is difficult to delineate.   Could consider follow-up ultrasound in 6-12 weeks.      Small amount of free fluid.      Small uterine fibroid.      Suspected nabothian cysts.         CT ABDOMEN PELVIS W IV CONTRAST Additional Contrast? None   Final Result   Appendix not definitively identified.  Correlate for history of appendectomy.   If there is relevant concern for appendicitis then would suggest short-term   repeat CT.      Small amount of intermediate attenuating material in the cul-de-sac likely   reflecting blood.  This is most commonly seen with a ruptured hemorrhagic   cyst.  Note that there is a 4.8 cm structure along the ventral margin of the   uterus, differential would include ovarian lesion, tubo-ovarian abscess,   portion, etc..  Suggest further evaluation with pelvic ultrasound.      Nonobstructive right renal calculus.               ED BEDSIDE ULTRASOUND:   Performed by ED Physician - none    LABS:  Labs Reviewed   CBC WITH AUTO DIFFERENTIAL - Abnormal; Notable for the following components:       Result Value    WBC 11.5 (*)     MCHC 32.9 (*)     Neutrophils Absolute 6.8 (*)     Monocytes Absolute 1.2 (*)     All other components within normal limits   COMPREHENSIVE METABOLIC PANEL - Abnormal; Notable for the following components:    Glucose 103 (*)     All other components within normal limits   APTT - Abnormal; Notable for the following components:    aPTT 23.5 (*)     All other components within normal limits   URINALYSIS WITH REFLEX TO CULTURE - Abnormal; Notable for the following components:    Clarity, UA CLOUDY (*)     Protein, UA TRACE (*)     Leukocyte Esterase, Urine MODERATE (*)     All other components within normal limits   MICROSCOPIC URINALYSIS - Abnormal; Notable for the following components:    Bacteria, UA RARE (*)     WBC, UA 20-50 (*)     All other components within normal limits   POCT CREATININE - Normal   CULTURE,

## 2024-07-25 NOTE — ED TRIAGE NOTES
Pt arrived via EMS from home, c.o RLQ pain, hx of ovarian cyst on right, 300ml NS, 4 zofran and 100mcg of fentanyl given in route,

## 2024-07-26 LAB — BACTERIA UR CULT: NORMAL

## 2024-08-22 ENCOUNTER — APPOINTMENT (OUTPATIENT)
Dept: BEHAVIORAL HEALTH | Facility: CLINIC | Age: 39
End: 2024-08-22
Payer: MEDICAID

## 2024-08-22 DIAGNOSIS — F42.8 OBSESSIVE THINKING: ICD-10-CM

## 2024-08-22 DIAGNOSIS — F43.12 NIGHTMARES ASSOCIATED WITH CHRONIC POST-TRAUMATIC STRESS DISORDER: ICD-10-CM

## 2024-08-22 DIAGNOSIS — F90.2 ATTENTION DEFICIT HYPERACTIVITY DISORDER, COMBINED TYPE: Primary | ICD-10-CM

## 2024-08-22 DIAGNOSIS — F51.5 NIGHTMARES ASSOCIATED WITH CHRONIC POST-TRAUMATIC STRESS DISORDER: ICD-10-CM

## 2024-08-22 DIAGNOSIS — F31.81 BIPOLAR 2 DISORDER (MULTI): ICD-10-CM

## 2024-08-22 DIAGNOSIS — F41.1 GAD (GENERALIZED ANXIETY DISORDER): ICD-10-CM

## 2024-08-22 DIAGNOSIS — F43.10 PTSD (POST-TRAUMATIC STRESS DISORDER): ICD-10-CM

## 2024-08-22 PROCEDURE — 99214 OFFICE O/P EST MOD 30 MIN: CPT | Performed by: NURSE PRACTITIONER

## 2024-08-22 RX ORDER — METHYLPHENIDATE HYDROCHLORIDE 10 MG/1
10 TABLET ORAL 2 TIMES DAILY
Qty: 60 TABLET | Refills: 0 | Status: SHIPPED | OUTPATIENT
Start: 2024-10-21 | End: 2024-11-20

## 2024-08-22 RX ORDER — METHYLPHENIDATE HYDROCHLORIDE 10 MG/1
10 TABLET ORAL 2 TIMES DAILY
Qty: 60 TABLET | Refills: 0 | Status: SHIPPED | OUTPATIENT
Start: 2024-09-21 | End: 2024-10-21

## 2024-08-22 RX ORDER — METHYLPHENIDATE HYDROCHLORIDE 10 MG/1
10 TABLET ORAL 2 TIMES DAILY
Qty: 60 TABLET | Refills: 0 | Status: SHIPPED | OUTPATIENT
Start: 2024-08-22 | End: 2024-09-21

## 2024-08-22 ASSESSMENT — PATIENT HEALTH QUESTIONNAIRE - PHQ9
4. FEELING TIRED OR HAVING LITTLE ENERGY: NEARLY EVERY DAY
6. FEELING BAD ABOUT YOURSELF - OR THAT YOU ARE A FAILURE OR HAVE LET YOURSELF OR YOUR FAMILY DOWN: MORE THAN HALF THE DAYS
7. TROUBLE CONCENTRATING ON THINGS, SUCH AS READING THE NEWSPAPER OR WATCHING TELEVISION: NEARLY EVERY DAY
5. POOR APPETITE OR OVEREATING: MORE THAN HALF THE DAYS
1. LITTLE INTEREST OR PLEASURE IN DOING THINGS: MORE THAN HALF THE DAYS
2. FEELING DOWN, DEPRESSED OR HOPELESS: MORE THAN HALF THE DAYS
9. THOUGHTS THAT YOU WOULD BE BETTER OFF DEAD, OR OF HURTING YOURSELF: NOT AT ALL
3. TROUBLE FALLING OR STAYING ASLEEP OR SLEEPING TOO MUCH: MORE THAN HALF THE DAYS
10. IF YOU CHECKED OFF ANY PROBLEMS, HOW DIFFICULT HAVE THESE PROBLEMS MADE IT FOR YOU TO DO YOUR WORK, TAKE CARE OF THINGS AT HOME, OR GET ALONG WITH OTHER PEOPLE: VERY DIFFICULT
8. MOVING OR SPEAKING SO SLOWLY THAT OTHER PEOPLE COULD HAVE NOTICED. OR THE OPPOSITE, BEING SO FIGETY OR RESTLESS THAT YOU HAVE BEEN MOVING AROUND A LOT MORE THAN USUAL: NEARLY EVERY DAY

## 2024-08-22 ASSESSMENT — ANXIETY QUESTIONNAIRES
GAD7 TOTAL SCORE: 18
5. BEING SO RESTLESS THAT IT IS HARD TO SIT STILL: NEARLY EVERY DAY
6. BECOMING EASILY ANNOYED OR IRRITABLE: MORE THAN HALF THE DAYS
4. TROUBLE RELAXING: NEARLY EVERY DAY
7. FEELING AFRAID AS IF SOMETHING AWFUL MIGHT HAPPEN: NEARLY EVERY DAY
IF YOU CHECKED OFF ANY PROBLEMS ON THIS QUESTIONNAIRE, HOW DIFFICULT HAVE THESE PROBLEMS MADE IT FOR YOU TO DO YOUR WORK, TAKE CARE OF THINGS AT HOME, OR GET ALONG WITH OTHER PEOPLE: EXTREMELY DIFFICULT
1. FEELING NERVOUS, ANXIOUS, OR ON EDGE: MORE THAN HALF THE DAYS
3. WORRYING TOO MUCH ABOUT DIFFERENT THINGS: NEARLY EVERY DAY
2. NOT BEING ABLE TO STOP OR CONTROL WORRYING: MORE THAN HALF THE DAYS

## 2024-08-22 ASSESSMENT — ENCOUNTER SYMPTOMS
SLEEP DISTURBANCE: 1
NERVOUS/ANXIOUS: 1

## 2024-08-22 NOTE — PROGRESS NOTES
"Adult Ambulatory Psychiatry Progress Note      Assessment/Plan     Impression:  Nova Kohler is a 39 y.o. female domiciled  [place] , employed as [employer] , who presents for follow up with CC of \"Switching to Ritalin does help with focus and motivation but it still upset my stomach so I switched it to taking it only once daily and maybe take an additional one if I need to really get an extra boost to focus more - like now needing to focus on packing and moving. I got approved for Section 8 housing, and moving to [place] as my landlord has a place there for me, and willing to work with me on this, even though he doesn't like Section 8. I am ok, but I have had a couple nightmares here and there, and many of them about Dominik and a demon following me. Super weird.\"    Plan: Patient was cooperative and engaging if not nervous. Ritalin still caused her GI upset but switched it to once daily with option to twice daily when needing help focusing later in the day, to offset GI upset. Feels current stress in her life is packing/moving but feels that will improve once she is closer to where her son goes to school. Reviewed and agreed that no other changes to medications or treatment plan were needed at this time.     Medication: Methylphenidate (Focalin)10mg twice daily. Wellbutrin XL 150mg every day. Buspar 10mg to be taken middle of the day, late afternoon. Continues Buspar 30mg twice daily, Cymbalta 60mg every day, Atarax 25mg (up to 75mg) as needed for anxiety management, Lamictal 300mg every day, and Trazodone 50-100mg at bedtime.    Reviewed r/b/a, possible side effects of the medication. Client is aware about the benefit outweighs the risk.     Diagnoses and all orders for this visit:  Attention deficit hyperactivity disorder, combined type  -     methylphenidate (Ritalin) 10 mg tablet; Take 1 tablet (10 mg) by mouth 2 times a day.  -     methylphenidate (Ritalin) 10 mg tablet; Take 1 " "tablet (10 mg) by mouth 2 times a day. Do not fill before 2024.  -     methylphenidate (Ritalin) 10 mg tablet; Take 1 tablet (10 mg) by mouth 2 times a day. Do not fill before 2024.  Bipolar 2 disorder (Multi)  Obsessive thinking  JESSICA (generalized anxiety disorder)  PTSD (post-traumatic stress disorder)  Nightmares associated with chronic post-traumatic stress disorder          Therapy: none    Other: n/a    Patient is reminded that if there is SI to call [phone] and get themselves to the closest ED for evaluation, otherwise contact me for other questions/concerns.     Subjective   HPI:  \"Both the patient, and family and caregivers and guardians as appropriate, were informed of the current need to conduct treatment via telephone. I have confirmed the patient's identity via the following (minimum of three) acceptable identifiers as per  Policy PH-9: , address on file, name of insurance plan.\"    Virtual or Telephone Consent    An interactive audio and video telecommunication system which permits real time communications between the patient (at the originating site) and provider (at the distant site) was utilized to provide this telehealth service.   Verbal consent was requested and obtained from Nova Kohler on this date, 24 for a telehealth visit.     Present Illness - anxiety, depression     Characteristics/Recent psychiatric symptoms (pertinent positives and negatives) - reports things are slightly more amicable between her soon to be ex- and herself, but divorce is on hold d/t both filing Bankruptcy 13 and he had control over their money so once that is finally approved soon, the divorce will be finalized. Reports her son started regular public school yesterday and it went well, and because she is moving to [place] within the next 2-3 weeks, she expects being closer to her son will make it easier for her to see her son more frequently and drive him to/from school when " needed. Reports her son is seeing a counselor now and reflects over the past 2 weeks a significant improvement in his behavior. Reports currently feeling excited and nervous with the direction that her life is heading. With cooler temperatures, 70-80s, she is feeling more comfortable with going outside to walk, and enjoy her time and finds her depression, while still there, to 'be more manageable for sure.' Reports Ritalin does help with attention/focus and energizes her through the day, but still does have the upset GI issues and acknowledges that she will just take 1 a day because of the benefits she sees with taking it and may take the additional Ritalin when she notices it is waning by the afternoon. Will take Zofran or Hyoscyamine or Imodium to help with the nausea or diarrhea as she feels this the 'happy medium' she can live with. Sleep averages 9 hrs a night but does wake up 5x a night sometimes d/t nightmares, but is able to go back to sleep. Wakes up tired lately but feels that is because she is adjusting to the school year starting and used to sleep 12 hours, so feels she is just having to force herself to get up early. Reports no renate but feels more energized and motivated with improved energy because of the Ritalin. Still feels mentally/physically tired through the day, but no longer feels the need to nap. Reports appetite is down d/t the Ritalin and 'I am ok with that to help me slow my eating down.' Reports still a mixture of racing, obsessive, ruminating thoughts but the intensity and frequency are down from 7/10 to a 2-3/10 and is making effort to redirect her thoughts and think about more productive or happier thoughts.     Onset/timeframe - 4 weeks  Type - anxiety, sleep, depression  Duration - situational  Aggravating and/or relieving factors/triggers - relief with now moving to housing in [place], closer to her son, so she can be more in sync with his school schedule/supportive of his needs and  finding the relationship with her soon to be ex  to be calmer but stress with packing/moving to still be a factor in her life.  Treatment and treatment changes (new meds, dosage increases or decreases, med compliance, therapy frequency, etc.) (Past and Recent) - Focalin 10mg BID, Wellbutrin XL 150mg QD, Buspar 30mg BID, Lamictal 300mg QD, Trazodone 50mg @[facility], Cymbalta 60mg QD. Atarax 25mg-75mg PRN/TID. Still looking for a new therapist.     Issues: Denies SI/HI/AVH, currently.            Review of Systems   Psychiatric/Behavioral:  Positive for sleep disturbance. The patient is nervous/anxious.    All other systems reviewed and are negative.      OARRS:  Blake Gupta, APRN-CNP on 8/22/2024  9:38 AM  I have personally reviewed the OARRS report for Nova Kohler. I have considered the risks of abuse, dependence, addiction and diversion    Is the patient prescribed a combination of a benzodiazepine and opioid?  No    Last Urine Drug Screen / ordered today: YES  Recent Results (from the past 8760 hour(s))   Methylphenidate And Metabolite,Conf,Urine    Collection Time: 06/27/24 11:25 AM   Result Value Ref Range    Ritalinic acid Urine >5000.0 ng/mL    Methylphenidate Urine Quantitative 2643.1 ng/mL   Drug Screen, Urine With Reflex to Confirmation    Collection Time: 06/27/24 11:25 AM   Result Value Ref Range    Amphetamine Screen, Urine Presumptive Negative Presumptive Negative    Barbiturate Screen, Urine Presumptive Negative Presumptive Negative    Benzodiazepines Screen, Urine Presumptive Negative Presumptive Negative    Cannabinoid Screen, Urine Presumptive Negative Presumptive Negative    Cocaine Metabolite Screen, Urine Presumptive Negative Presumptive Negative    Fentanyl Screen, Urine Presumptive Negative Presumptive Negative    Opiate Screen, Urine Presumptive Negative Presumptive Negative    Oxycodone Screen, Urine Presumptive Negative Presumptive Negative    PCP Screen, Urine Presumptive Negative  Presumptive Negative    Methadone Screen, Urine Presumptive Negative Presumptive Negative       N/A    Clinical rationale for not completing a Urine Drug Screen: Reordered today    Controlled Substance Agreement:  Date of the Last Agreement: 05/30/2024    I attest to having ordered seen the CSA and ordering it for the following substance.    Stimulants:   What is the patient's goal of therapy? Improved ADHD, depression, and energy levels.  Is this being achieved with current treatment? yes    Activities of Daily Living:   Is your overall impression that this patient is benefiting (symptom reduction outweighs side effects) from stimulant therapy? yes     1. Physical Functioning: Better  2. Family Relationship: Same  3. Social Relationship: Same  4. Mood: Better  5. Sleep Patterns: Same  6. Overall Function: Same      Objective   Mental Status Exam:  General Appearance: Fairly groomed  Attitude/Behavior: Cooperative, Distracted  Motor: No psychomotor agitation or retardation, no tremor or other abnormal movements  Speech: Normal rate, volume, prosody  Gait/Station: Other:(comment) (sitting on front porch at home, over virtual connection)  Mood: 'good but stressed with moving'  Affect: Euthymic, full-range, Anxious, Congruent with mood and topic of conversation  Thought Process: Linear, goal directed  Thought Associations: No loosening of associations  Thought Content: Normal  Perception: No perceptual abnormalities noted  Sensorium: Alert and oriented to person, place, time and situation  Insight: Fair  Cognition: Cognitively intact to conversational testing with respect to attention, orientation, fund of knowledge, recent and remote memory, and language  Testing: N/A    JESSICA-7/PHQ-9 scores reviewed: 18, 19 compared to 19, 23 reflecting slight improvement in anxiety and further improvement in depressive symptoms.    Current Medications:  Current Outpatient Medications on File Prior to Visit   Medication Sig Dispense  Refill    albuterol 90 mcg/actuation inhaler Inhale 2 puffs every 4 hours if needed for wheezing or shortness of breath.      Allergy Relief, loratadine, 10 mg tablet Take 1 tablet (10 mg) by mouth if needed for allergies.      BreatheRite MDI Spacer inhaler 1 each once daily as needed (for asthma relief PRN).      buPROPion XL (Wellbutrin XL) 150 mg 24 hr tablet Take 1 tablet (150 mg) by mouth once daily. Do not crush, chew, or split. 90 tablet 3    busPIRone (Buspar) 10 mg tablet Take 1 tablet (10 mg) by mouth once daily at noon. Take before meals.. 90 tablet 3    busPIRone (Buspar) 30 mg tablet Take 1 tablet (30 mg) by mouth 2 times a day. As directed 180 tablet 3    cholecalciferol (Vitamin D-3) 50 mcg (2,000 unit) capsule Take 1 capsule (50 mcg) by mouth early in the morning..      DULoxetine (Cymbalta) 60 mg DR capsule Take 1 capsule (60 mg) by mouth once daily. 90 capsule 3    hydrOXYzine HCL (Atarax) 25 mg tablet Take 1 tablet (25 mg) by mouth 3 times a day as needed.      hyoscyamine 0.125 mg disintegrating tablet Place 1 tablet (0.125 mg) under the tongue every 4 hours if needed (as needed).      lamoTRIgine (LaMICtal) 100 mg tablet Take 3 tablets (300 mg) by mouth once daily.  As Directed Take 1 tablet in the AM and 2 tablets at bedtime.      levonorgestrel (Mirena) 21 mcg/24 hours (8 yrs) 52 mg IUD by intrauterine route. Use as directed      magnesium 200 mg tablet Take 1 tablet (200 mg) by mouth as needed at bedtime (sleep aid).      methocarbamol (Robaxin) 750 mg tablet Take 1 tablet (750 mg) by mouth 3 times a day. 2-3x daily      ondansetron (Zofran) 4 mg tablet Take 1 tablet (4 mg) by mouth every 8 hours if needed for vomiting or nausea.      traZODone (Desyrel) 50 mg tablet Take 1-2 tablets ( mg) by mouth as needed at bedtime for sleep. (Patient taking differently: Take 1-2 tablets ( mg) by mouth once daily at bedtime.) 180 tablet 3    Ubrelvy 100 mg tablet tablet Take 1 tablet (100 mg)  by mouth if needed (migraine).      [DISCONTINUED] methylphenidate (Ritalin) 10 mg tablet Take 1 tablet (10 mg) by mouth 2 times a day. 60 tablet 0     No current facility-administered medications on file prior to visit.       Lab Review:   Lab on 06/27/2024   Component Date Value    Amphetamine Screen, Urine 06/27/2024 Presumptive Negative     Barbiturate Screen, Urine 06/27/2024 Presumptive Negative     Benzodiazepines Screen, * 06/27/2024 Presumptive Negative     Cannabinoid Screen, Urine 06/27/2024 Presumptive Negative     Cocaine Metabolite Scree* 06/27/2024 Presumptive Negative     Fentanyl Screen, Urine 06/27/2024 Presumptive Negative     Opiate Screen, Urine 06/27/2024 Presumptive Negative     Oxycodone Screen, Urine 06/27/2024 Presumptive Negative     PCP Screen, Urine 06/27/2024 Presumptive Negative     Methadone Screen, Urine 06/27/2024 Presumptive Negative     Ritalinic acid Urine 06/27/2024 >5000.0     Methylphenidate Urine Qu* 06/27/2024 2643.1          Time Spent:    Prep time: 1 min.  Direct patient time: 28 min.  Documentation time: 6 min.  Total time: 35 min.    Next Appointment:  Follow up in about 6 weeks (around 10/3/2024).

## 2024-08-27 DIAGNOSIS — F31.81 BIPOLAR 2 DISORDER (MULTI): Primary | ICD-10-CM

## 2024-08-27 RX ORDER — LAMOTRIGINE 100 MG/1
300 TABLET ORAL DAILY
Qty: 270 TABLET | Refills: 3 | Status: SHIPPED | OUTPATIENT
Start: 2024-08-27 | End: 2025-08-27

## 2024-10-02 ENCOUNTER — APPOINTMENT (OUTPATIENT)
Dept: BEHAVIORAL HEALTH | Facility: CLINIC | Age: 39
End: 2024-10-02
Payer: MEDICAID

## 2024-10-02 DIAGNOSIS — F42.8 OBSESSIVE THINKING: ICD-10-CM

## 2024-10-02 DIAGNOSIS — F33.8 SEASONAL AFFECTIVE DISORDER (CMS-HCC): ICD-10-CM

## 2024-10-02 DIAGNOSIS — G47.9 SLEEP DIFFICULTIES: ICD-10-CM

## 2024-10-02 DIAGNOSIS — F43.12 NIGHTMARES ASSOCIATED WITH CHRONIC POST-TRAUMATIC STRESS DISORDER: ICD-10-CM

## 2024-10-02 DIAGNOSIS — F43.10 PTSD (POST-TRAUMATIC STRESS DISORDER): ICD-10-CM

## 2024-10-02 DIAGNOSIS — F90.2 ATTENTION DEFICIT HYPERACTIVITY DISORDER, COMBINED TYPE: ICD-10-CM

## 2024-10-02 DIAGNOSIS — F33.2 SEVERE EPISODE OF RECURRENT MAJOR DEPRESSIVE DISORDER, WITHOUT PSYCHOTIC FEATURES (MULTI): ICD-10-CM

## 2024-10-02 DIAGNOSIS — F33.41 RECURRENT MAJOR DEPRESSIVE DISORDER, IN PARTIAL REMISSION (CMS-HCC): ICD-10-CM

## 2024-10-02 DIAGNOSIS — F51.5 NIGHTMARES ASSOCIATED WITH CHRONIC POST-TRAUMATIC STRESS DISORDER: ICD-10-CM

## 2024-10-02 DIAGNOSIS — F41.1 GAD (GENERALIZED ANXIETY DISORDER): ICD-10-CM

## 2024-10-02 DIAGNOSIS — F31.81 BIPOLAR 2 DISORDER (MULTI): ICD-10-CM

## 2024-10-02 PROCEDURE — 99214 OFFICE O/P EST MOD 30 MIN: CPT | Performed by: NURSE PRACTITIONER

## 2024-10-02 RX ORDER — BUPROPION HYDROCHLORIDE 300 MG/1
300 TABLET ORAL DAILY
Qty: 90 TABLET | Refills: 3 | Status: SHIPPED | OUTPATIENT
Start: 2024-10-02 | End: 2025-10-02

## 2024-10-02 RX ORDER — METHOCARBAMOL 500 MG/1
1 TABLET, FILM COATED ORAL
COMMUNITY
Start: 2024-08-28

## 2024-10-02 ASSESSMENT — ANXIETY QUESTIONNAIRES
2. NOT BEING ABLE TO STOP OR CONTROL WORRYING: NEARLY EVERY DAY
1. FEELING NERVOUS, ANXIOUS, OR ON EDGE: NEARLY EVERY DAY
7. FEELING AFRAID AS IF SOMETHING AWFUL MIGHT HAPPEN: NEARLY EVERY DAY
IF YOU CHECKED OFF ANY PROBLEMS ON THIS QUESTIONNAIRE, HOW DIFFICULT HAVE THESE PROBLEMS MADE IT FOR YOU TO DO YOUR WORK, TAKE CARE OF THINGS AT HOME, OR GET ALONG WITH OTHER PEOPLE: EXTREMELY DIFFICULT
4. TROUBLE RELAXING: NEARLY EVERY DAY
GAD7 TOTAL SCORE: 21
6. BECOMING EASILY ANNOYED OR IRRITABLE: NEARLY EVERY DAY
3. WORRYING TOO MUCH ABOUT DIFFERENT THINGS: NEARLY EVERY DAY
5. BEING SO RESTLESS THAT IT IS HARD TO SIT STILL: NEARLY EVERY DAY

## 2024-10-02 ASSESSMENT — PATIENT HEALTH QUESTIONNAIRE - PHQ9
8. MOVING OR SPEAKING SO SLOWLY THAT OTHER PEOPLE COULD HAVE NOTICED. OR THE OPPOSITE, BEING SO FIGETY OR RESTLESS THAT YOU HAVE BEEN MOVING AROUND A LOT MORE THAN USUAL: NEARLY EVERY DAY
1. LITTLE INTEREST OR PLEASURE IN DOING THINGS: MORE THAN HALF THE DAYS
9. THOUGHTS THAT YOU WOULD BE BETTER OFF DEAD, OR OF HURTING YOURSELF: NOT AT ALL
5. POOR APPETITE OR OVEREATING: NEARLY EVERY DAY
2. FEELING DOWN, DEPRESSED OR HOPELESS: NEARLY EVERY DAY
7. TROUBLE CONCENTRATING ON THINGS, SUCH AS READING THE NEWSPAPER OR WATCHING TELEVISION: NEARLY EVERY DAY
10. IF YOU CHECKED OFF ANY PROBLEMS, HOW DIFFICULT HAVE THESE PROBLEMS MADE IT FOR YOU TO DO YOUR WORK, TAKE CARE OF THINGS AT HOME, OR GET ALONG WITH OTHER PEOPLE: EXTREMELY DIFFICULT
6. FEELING BAD ABOUT YOURSELF - OR THAT YOU ARE A FAILURE OR HAVE LET YOURSELF OR YOUR FAMILY DOWN: MORE THAN HALF THE DAYS
4. FEELING TIRED OR HAVING LITTLE ENERGY: NEARLY EVERY DAY
3. TROUBLE FALLING OR STAYING ASLEEP OR SLEEPING TOO MUCH: NEARLY EVERY DAY

## 2024-10-02 ASSESSMENT — ENCOUNTER SYMPTOMS
NERVOUS/ANXIOUS: 1
SLEEP DISTURBANCE: 1

## 2024-10-02 NOTE — PROGRESS NOTES
"Adult Ambulatory Psychiatry Progress Note      Assessment/Plan     Impression:  Nova Kohler is a 39 y.o. female domiciled  in process of , employed as [employer] , who presents for follow up with CC of \"I ended up not being able to move because the people who lived there, who were to be evicted, they did end up sending the landlord money through a cashapp and he accepted it, so the entire process was restarted. So I am now going to look at it today and 2 others, and my Section 8 hasn't been approved for transfer to [facility], so that is holding up my housing process. My step-dad's Medicaid coverage is on hold and my mom's calling me constantly is putting me on edge, and then Sam called me a fucking bitch, and he is a little shit for that. His dad is dating someone new and he is probably saying bad things to her about me and he is hearing this. So I am a mess.\"    Plan: Patient was cooperative but reserved and anxious. Feels seasonal depression, and dealing with son's bad behaviors have been getting her mood down. Still finds Ritalin once daily (to minimize impact on GI) to be helpful with ADHD management. Reviewed and agreed to increasing Wellbutrin for seasonal depression and talked about monitoring for triggering of manic symptoms and to notify this provider, but otherwise no other changes to medications or treatment plan were needed at this time.     Medication: Methylphenidate (Focalin)10mg once daily. Increases Wellbutrin XL 150mg to 300mg every day. Buspar 10mg to be taken middle of the day, late afternoon. Continues Buspar 30mg twice daily, Cymbalta 60mg every day, Atarax 25mg (up to 75mg) as needed for anxiety management, Lamictal 300mg every day, and Trazodone 50-100mg at bedtime.    Reviewed r/b/a, possible side effects of the medication. Client is aware about the benefit outweighs the risk.     Diagnoses and all orders for this visit:  Recurrent major depressive disorder, " "in partial remission (CMS-HCC)  Bipolar 2 disorder (Multi)  -     buPROPion XL (Wellbutrin XL) 300 mg 24 hr tablet; Take 1 tablet (300 mg) by mouth once daily. Do not crush, chew, or split.  Seasonal affective disorder (CMS-HCC)  -     buPROPion XL (Wellbutrin XL) 300 mg 24 hr tablet; Take 1 tablet (300 mg) by mouth once daily. Do not crush, chew, or split.  Severe episode of recurrent major depressive disorder, without psychotic features (Multi)  -     buPROPion XL (Wellbutrin XL) 300 mg 24 hr tablet; Take 1 tablet (300 mg) by mouth once daily. Do not crush, chew, or split.  Attention deficit hyperactivity disorder, combined type  Obsessive thinking  JESSICA (generalized anxiety disorder)  PTSD (post-traumatic stress disorder)  Nightmares associated with chronic post-traumatic stress disorder  Sleep difficulties            Therapy: none    Other: n/a    Patient is reminded that if there is SI to call [phone] and get themselves to the closest ED for evaluation, otherwise contact me for other questions/concerns.     Subjective   HPI:  \"Both the patient, and family and caregivers and guardians as appropriate, were informed of the current need to conduct treatment via telephone. I have confirmed the patient's identity via the following (minimum of three) acceptable identifiers as per  Policy PH-9: , address on file, name of insurance plan.\"    Virtual or Telephone Consent    An interactive audio and video telecommunication system which permits real time communications between the patient (at the originating site) and provider (at the distant site) was utilized to provide this telehealth service.   Verbal consent was requested and obtained from Nova Kohler on this date, 10/02/24 for a telehealth visit.     Present Illness - anxiety, depression     Characteristics/Recent psychiatric symptoms (pertinent positives and negatives) - reports noticing her anxiety and depression are elevated again as she is stressed out " with her on-again, off-again moving process had been on hold d/t her landlord's other property's tenants mucking up the process, slowing down her ability to move close to her son. Now the home will be open, but there are 2 other properties that she will look at as well, but now there being issues with her Section 8 housing coverage is having issues getting transferred to [facility] from [facility] is another bottleneck that she has to deal with. Reports also her son's rude behaviors, because of his father's negative talks about the patient in front of him, has caused the patient to take pause and feeling hurt with how nasty he has been towards her, and hurting her feelings as well. Reports also with her step-father's care and housing now up in the air as his Medicaid that covered his nursing home was denied, and her mother's constant calling her 'and I am always the one to parent her' had gotten her anxiety to spike again, leaving her feeling on edge all the time, for the past 2 weeks. Feels with her anxiety being up, that is triggering her depression but that with her son's nastiness towards her and he sees his father in a positive light when he is the bad person in the entire situation, 'really hurts me even mreo.' Reports sleep is down d/t interrupted sleep or issues with falling asleep because of the stress and depression, often just 4 hours a night, 'as I am just obsessing about everything going on in my life right now. It is just obsessive thinking and nothing else.' Admits then she will sleep an hour or two in the afternoon, d/t the poor sleep quality at night, which throws her routine off. Only time she may get 8-9 hrs of sleep is if she has nothing to do the next day, and she had a poor nights sleep, and will then just sleep through the day, instead. Admits to low energy levels and mental/physical fatigue constantly. The Methylphenidate is helping with ADHD symptoms 'as it is worth it. It does give me  the motivation and focus to function to do the smallest things even, but while it still is churning up my stomach, it is slightly better than before.' Reports not needing to use as frequently the Zofran,  Hyoscyamine or Imodium to help with the nausea or diarrhea. Denies renate. Reports appetite is now overeating at night only from 'stress eating, emotional eating to the point of me getting sick - something for me to just do.' Reports nonstop racing, obsessive, ruminating, and intrusive thoughts. Reports noticing the gray, dreary weather with rain 'were soul sucking days for me, and reminded me of the coming winter and not something I was looking forward to, so there was that.'     Onset/timeframe - 4 weeks  Type - anxiety, sleep, depression  Duration - daily  Aggravating and/or relieving factors/triggers - move to new housing on hold, son's nasty behaviors towards her, hurting her feelings have become increasingly worse (learned it from her ex) and seasonal changes bringing her mood down.  Treatment and treatment changes (new meds, dosage increases or decreases, med compliance, therapy frequency, etc.) (Past and Recent) - Focalin 10mg BID, Wellbutrin XL 150mg QD, Buspar 30mg BID, Lamictal 300mg QD, Trazodone 50mg @[facility], Cymbalta 60mg QD. Atarax 25mg-75mg PRN/TID. Still looking for a new therapist.     Issues: Denies SI/HI/AVH, currently.            Review of Systems   Psychiatric/Behavioral:  Positive for dysphoric mood and sleep disturbance. The patient is nervous/anxious.    All other systems reviewed and are negative.      OARRS:  Blake Gupta, APRN-CNP on 10/6/2024  6:10 PM  I have personally reviewed the OARRS report for Nova Kohler. I have considered the risks of abuse, dependence, addiction and diversion    Is the patient prescribed a combination of a benzodiazepine and opioid?  No    Last Urine Drug Screen / ordered today: YES  Recent Results (from the past 8760 hour(s))   Methylphenidate And  Metabolite,Conf,Urine    Collection Time: 06/27/24 11:25 AM   Result Value Ref Range    Ritalinic acid Urine >5000.0 ng/mL    Methylphenidate Urine Quantitative 2643.1 ng/mL   Drug Screen, Urine With Reflex to Confirmation    Collection Time: 06/27/24 11:25 AM   Result Value Ref Range    Amphetamine Screen, Urine Presumptive Negative Presumptive Negative    Barbiturate Screen, Urine Presumptive Negative Presumptive Negative    Benzodiazepines Screen, Urine Presumptive Negative Presumptive Negative    Cannabinoid Screen, Urine Presumptive Negative Presumptive Negative    Cocaine Metabolite Screen, Urine Presumptive Negative Presumptive Negative    Fentanyl Screen, Urine Presumptive Negative Presumptive Negative    Opiate Screen, Urine Presumptive Negative Presumptive Negative    Oxycodone Screen, Urine Presumptive Negative Presumptive Negative    PCP Screen, Urine Presumptive Negative Presumptive Negative    Methadone Screen, Urine Presumptive Negative Presumptive Negative       N/A    Clinical rationale for not completing a Urine Drug Screen: Reordered today    Controlled Substance Agreement:  Date of the Last Agreement: 05/30/2024    I attest to having ordered seen the CSA and ordering it for the following substance.    Stimulants:   What is the patient's goal of therapy? Improved ADHD, depression, and energy levels.  Is this being achieved with current treatment? yes    Activities of Daily Living:   Is your overall impression that this patient is benefiting (symptom reduction outweighs side effects) from stimulant therapy? yes     1. Physical Functioning: Better  2. Family Relationship: Same  3. Social Relationship: Same  4. Mood: Better  5. Sleep Patterns: Same  6. Overall Function: Same      Objective   Mental Status Exam:  General Appearance: Well groomed, appropriate eye contact  Attitude/Behavior: Cooperative, Distracted  Motor: No psychomotor agitation or retardation, no tremor or other abnormal  movements  Speech: Normal rate, volume, prosody  Gait/Station: Other:(comment) (sitting on couch at home, over virtual connection)  Mood: 'not great'  Affect: Dysphoric, constricted but reactive, Anxious, Congruent with mood and topic of conversation  Thought Process: Perseverative, Flight of ideas (racing, obsessive)  Thought Associations: No loosening of associations  Thought Content: Other: (comment) (hoping to find new housing as initial offer fell through, and dealing with son's increasingly bad behaviors torwards her, stemming from her ex's bad mouthing her in front of their son which is hurting her feelings, and leaving her more depressed)  Perception: No perceptual abnormalities noted  Sensorium: Alert and oriented to person, place, time and situation  Insight: Fair  Judgement: Fair  Cognition: Cognitively intact to conversational testing with respect to attention, orientation, fund of knowledge, recent and remote memory, and language  Testing: N/A    JESSICA-7/PHQ-9 scores reviewed: 21, 22 compared to 18, 19 reflecting increases in anxiety and depressive symptoms.    Current Medications:  Current Outpatient Medications on File Prior to Visit   Medication Sig Dispense Refill    albuterol 90 mcg/actuation inhaler Inhale 2 puffs every 4 hours if needed for wheezing or shortness of breath.      Allergy Relief, loratadine, 10 mg tablet Take 1 tablet (10 mg) by mouth if needed for allergies.      BreatheRite MDI Spacer inhaler 1 each once daily as needed (for asthma relief PRN).      busPIRone (Buspar) 10 mg tablet Take 1 tablet (10 mg) by mouth once daily at noon. Take before meals.. 90 tablet 3    busPIRone (Buspar) 30 mg tablet Take 1 tablet (30 mg) by mouth 2 times a day. As directed 180 tablet 3    cholecalciferol (Vitamin D-3) 50 mcg (2,000 unit) capsule Take 1 capsule (50 mcg) by mouth early in the morning..      DULoxetine (Cymbalta) 60 mg DR capsule Take 1 capsule (60 mg) by mouth once daily. 90 capsule 3     hydrOXYzine HCL (Atarax) 25 mg tablet Take 1 tablet (25 mg) by mouth 3 times a day as needed.      hyoscyamine 0.125 mg disintegrating tablet Place 1 tablet (0.125 mg) under the tongue every 4 hours if needed (as needed).      lamoTRIgine (LaMICtal) 100 mg tablet Take 3 tablets (300 mg) by mouth once daily.  As Directed Take 1 tablet in the AM and 2 tablets at bedtime. 270 tablet 3    levonorgestrel (Mirena) 21 mcg/24 hours (8 yrs) 52 mg IUD by intrauterine route. Use as directed      magnesium 200 mg tablet Take 1 tablet (200 mg) by mouth as needed at bedtime (sleep aid).      methocarbamol (Robaxin) 500 mg tablet Take 1 tablet (500 mg) by mouth 3 times a day.      methylphenidate (Ritalin) 10 mg tablet Take 1 tablet (10 mg) by mouth 2 times a day. Do not fill before September 21, 2024. 60 tablet 0    [START ON 10/21/2024] methylphenidate (Ritalin) 10 mg tablet Take 1 tablet (10 mg) by mouth 2 times a day. Do not fill before October 21, 2024. 60 tablet 0    ondansetron (Zofran) 4 mg tablet Take 1 tablet (4 mg) by mouth every 8 hours if needed for vomiting or nausea.      traZODone (Desyrel) 50 mg tablet Take 1-2 tablets ( mg) by mouth as needed at bedtime for sleep. (Patient taking differently: Take 1-2 tablets ( mg) by mouth once daily at bedtime.) 180 tablet 3    Ubrelvy 100 mg tablet tablet Take 1 tablet (100 mg) by mouth if needed (migraine).      [DISCONTINUED] buPROPion XL (Wellbutrin XL) 150 mg 24 hr tablet Take 1 tablet (150 mg) by mouth once daily. Do not crush, chew, or split. 90 tablet 3    [DISCONTINUED] methocarbamol (Robaxin) 750 mg tablet Take 1 tablet (750 mg) by mouth 3 times a day. 2-3x daily      [DISCONTINUED] methylphenidate (Ritalin) 10 mg tablet Take 1 tablet (10 mg) by mouth 2 times a day. 60 tablet 0     No current facility-administered medications on file prior to visit.       Lab Review:   No visits with results within 2 Month(s) from this visit.   Latest known visit with  results is:   Lab on 06/27/2024   Component Date Value    Amphetamine Screen, Urine 06/27/2024 Presumptive Negative     Barbiturate Screen, Urine 06/27/2024 Presumptive Negative     Benzodiazepines Screen, * 06/27/2024 Presumptive Negative     Cannabinoid Screen, Urine 06/27/2024 Presumptive Negative     Cocaine Metabolite Scree* 06/27/2024 Presumptive Negative     Fentanyl Screen, Urine 06/27/2024 Presumptive Negative     Opiate Screen, Urine 06/27/2024 Presumptive Negative     Oxycodone Screen, Urine 06/27/2024 Presumptive Negative     PCP Screen, Urine 06/27/2024 Presumptive Negative     Methadone Screen, Urine 06/27/2024 Presumptive Negative     Ritalinic acid Urine 06/27/2024 >5000.0     Methylphenidate Urine Qu* 06/27/2024 2643.1          Time Spent:    Prep time: 1 min.  Direct patient time: 31 min.  Documentation time: 6 min.  Total time: 38 min.    Next Appointment:  Follow up in about 5 weeks (around 11/6/2024).

## 2024-10-06 PROBLEM — F43.21 FEELING GRIEF: Status: RESOLVED | Noted: 2023-10-02 | Resolved: 2024-10-06

## 2024-10-06 ASSESSMENT — ENCOUNTER SYMPTOMS: DYSPHORIC MOOD: 1

## 2024-11-06 ENCOUNTER — APPOINTMENT (OUTPATIENT)
Dept: BEHAVIORAL HEALTH | Facility: CLINIC | Age: 39
End: 2024-11-06
Payer: COMMERCIAL

## 2024-11-06 DIAGNOSIS — F43.12 NIGHTMARES ASSOCIATED WITH CHRONIC POST-TRAUMATIC STRESS DISORDER: ICD-10-CM

## 2024-11-06 DIAGNOSIS — F41.1 GAD (GENERALIZED ANXIETY DISORDER): ICD-10-CM

## 2024-11-06 DIAGNOSIS — F42.8 OBSESSIVE THINKING: ICD-10-CM

## 2024-11-06 DIAGNOSIS — F51.5 NIGHTMARES ASSOCIATED WITH CHRONIC POST-TRAUMATIC STRESS DISORDER: ICD-10-CM

## 2024-11-06 DIAGNOSIS — F31.81 BIPOLAR 2 DISORDER (MULTI): ICD-10-CM

## 2024-11-06 DIAGNOSIS — F90.2 ATTENTION DEFICIT HYPERACTIVITY DISORDER, COMBINED TYPE: Primary | ICD-10-CM

## 2024-11-06 DIAGNOSIS — F43.10 PTSD (POST-TRAUMATIC STRESS DISORDER): ICD-10-CM

## 2024-11-06 DIAGNOSIS — G47.9 SLEEP DIFFICULTIES: ICD-10-CM

## 2024-11-06 DIAGNOSIS — F33.8 SEASONAL AFFECTIVE DISORDER (CMS-HCC): ICD-10-CM

## 2024-11-06 PROCEDURE — 99214 OFFICE O/P EST MOD 30 MIN: CPT | Performed by: NURSE PRACTITIONER

## 2024-11-06 ASSESSMENT — PATIENT HEALTH QUESTIONNAIRE - PHQ9
9. THOUGHTS THAT YOU WOULD BE BETTER OFF DEAD, OR OF HURTING YOURSELF: NOT AT ALL
3. TROUBLE FALLING OR STAYING ASLEEP OR SLEEPING TOO MUCH: NEARLY EVERY DAY
7. TROUBLE CONCENTRATING ON THINGS, SUCH AS READING THE NEWSPAPER OR WATCHING TELEVISION: NEARLY EVERY DAY
5. POOR APPETITE OR OVEREATING: NEARLY EVERY DAY
6. FEELING BAD ABOUT YOURSELF - OR THAT YOU ARE A FAILURE OR HAVE LET YOURSELF OR YOUR FAMILY DOWN: SEVERAL DAYS
2. FEELING DOWN, DEPRESSED OR HOPELESS: SEVERAL DAYS
10. IF YOU CHECKED OFF ANY PROBLEMS, HOW DIFFICULT HAVE THESE PROBLEMS MADE IT FOR YOU TO DO YOUR WORK, TAKE CARE OF THINGS AT HOME, OR GET ALONG WITH OTHER PEOPLE: SOMEWHAT DIFFICULT
1. LITTLE INTEREST OR PLEASURE IN DOING THINGS: SEVERAL DAYS
4. FEELING TIRED OR HAVING LITTLE ENERGY: MORE THAN HALF THE DAYS

## 2024-11-06 ASSESSMENT — ANXIETY QUESTIONNAIRES
7. FEELING AFRAID AS IF SOMETHING AWFUL MIGHT HAPPEN: SEVERAL DAYS
6. BECOMING EASILY ANNOYED OR IRRITABLE: SEVERAL DAYS
2. NOT BEING ABLE TO STOP OR CONTROL WORRYING: MORE THAN HALF THE DAYS
3. WORRYING TOO MUCH ABOUT DIFFERENT THINGS: NEARLY EVERY DAY
1. FEELING NERVOUS, ANXIOUS, OR ON EDGE: NEARLY EVERY DAY
GAD7 TOTAL SCORE: 16
5. BEING SO RESTLESS THAT IT IS HARD TO SIT STILL: NEARLY EVERY DAY
4. TROUBLE RELAXING: NEARLY EVERY DAY
IF YOU CHECKED OFF ANY PROBLEMS ON THIS QUESTIONNAIRE, HOW DIFFICULT HAVE THESE PROBLEMS MADE IT FOR YOU TO DO YOUR WORK, TAKE CARE OF THINGS AT HOME, OR GET ALONG WITH OTHER PEOPLE: VERY DIFFICULT

## 2024-11-06 ASSESSMENT — ENCOUNTER SYMPTOMS: NERVOUS/ANXIOUS: 1

## 2024-11-06 NOTE — PROGRESS NOTES
"Adult Ambulatory Psychiatry Progress Note      Assessment/Plan     Impression:  Nova Kohler is a 39 y.o. female domiciled  in process of , employed as [employer] , who presents for follow up with CC of \"I got approved through [employer], and found a new place yesterday and moving Thursday and having everything done quickly. So I am getting my change of address done with the post office immediately, 's license, etc... So I am feeling better with the extra Wellbutrin, but also the excitement of the move is there, but I am feeling like good manic, if you want to call that. Maybe spending more than I should, but I am not worried about that right now, is my moving and then I will figure it out.\"    Plan: Patient was cooperative and engaging. Feels increased Wellbutrin is helping with managing her depressive symptoms but now feeling possibly more manic but wonders if it is due to having more on her plate than before, including moving to a new apartment on short notice. Reviewed and agreed to leaving medications and treatment plan in place at this time and will review at next appointment if dose of Wellbutrin needs to be reduced.     Medication: Methylphenidate (Focalin)10mg once daily. Wellbutrin XL 300mg every day. Buspar 10mg to be taken middle of the day, late afternoon. Continues Buspar 30mg twice daily, Cymbalta 60mg every day, Atarax 25mg (up to 75mg) as needed for anxiety management, Lamictal 300mg every day, and Trazodone 50-100mg at bedtime.    Reviewed r/b/a, possible side effects of the medication. Client is aware about the benefit outweighs the risk.     Diagnoses and all orders for this visit:  Attention deficit hyperactivity disorder, combined type  -     methylphenidate (Ritalin) 10 mg tablet; Take 1 tablet (10 mg) by mouth 2 times a day. Do not fill before November 20, 2024.  -     methylphenidate (Ritalin) 10 mg tablet; Take 1 tablet (10 mg) by mouth 2 times a day. Do not " "fill before 2024.  -     methylphenidate (Ritalin) 10 mg tablet; Take 1 tablet (10 mg) by mouth 2 times a day. Do not fill before 2025.  Bipolar 2 disorder (Multi)  Obsessive thinking  JESSICA (generalized anxiety disorder)  Seasonal affective disorder (CMS-HCC)  PTSD (post-traumatic stress disorder)  Nightmares associated with chronic post-traumatic stress disorder  Sleep difficulties              Therapy: none    Other: n/a    Patient is reminded that if there is SI to call [phone] and get themselves to the closest ED for evaluation, otherwise contact me for other questions/concerns.     Subjective   HPI:  \"Both the patient, and family and caregivers and guardians as appropriate, were informed of the current need to conduct treatment via telephone. I have confirmed the patient's identity via the following (minimum of three) acceptable identifiers as per  Policy PH-9: , address on file, name of insurance plan.\"    Virtual Consent    An interactive audio and video telecommunication system which permits real time communications between the patient (in parked car in front of [employer] store) and provider (at home office) was utilized to provide this telehealth service.   Verbal consent was requested and obtained from Nova Kohler on this date, 24 for a telehealth visit.     Present Illness - anxiety, depression     Characteristics/Recent psychiatric symptoms (pertinent positives and negatives) - reports noticing her anxiety and depression are improved since the increased dose of Wellbutrin but also getting the good news of getting a new home to live in, closer to her son in [place] and 'in a better neighborhood.' Reports however she is feeling more manic as a result, more energized and feels she wants to wait until the move is over before making a change. Reports her son is 'rodriguez. He can be amazing some days and then other days he is like vinegar. He can get super mean, when his " medicine wears off - like Dr. Mendoza and Mr. Harman changes.' Reports it is hard on her son with these changes but her ex remains steadfast on his view that he wants to be the 'cool, fun' parent and she is the strict one, which puts a wedge often in her relationship with her son. Reports sleep is still an issue, d/t interrupted sleep, and problems with falling asleep and when she wakes up feeling 'like I can conquer the world!'. Reports sleep quality/quantity will vary and on poor nights, will nap during the day to compensate the lack of sleep at night. Sleep is 4-5 hours at night, and 2 hours napping during the day. Admits energy levels are 'great' but notices mental/physical fatigue constantly. The Methylphenidate is helping with ADHD symptoms 'as it is worth it. It does give me the motivation and focus to function.' But notices at the same time she has so many things on her mind at once, with racing, obsessive thoughts about everything happening in her life all the time. Appetite is not the best as she is overeating (emotionally) but also being in the middle of packing, she is just eating more unhealthy foods, so is having more diarrhea and upset stomach than usual.      Onset/timeframe - 4 weeks  Type - anxiety, sleep, renate  Duration - daily  Aggravating and/or relieving factors/triggers - move to new housing is in effect on short notice the past few days, so is packing up quickly and moving in 2 days, along with dealing with son's rude behaviors ongoing. Feels better on Wellbutrin overcoming depression, but possibly is triggering manic symptoms now.  Treatment and treatment changes (new meds, dosage increases or decreases, med compliance, therapy frequency, etc.) (Past and Recent) - Focalin 10mg BID, Wellbutrin XL 300mg QD, Buspar 30mg BID, Lamictal 300mg QD, Trazodone 50mg @HS, Cymbalta 60mg QD. Atarax 25mg-75mg PRN/TID. Still looking for a new therapist.     Issues: Denies SI/HI/AVH, currently.             Review of Systems   Psychiatric/Behavioral:  Positive for agitation and sleep disturbance. The patient is nervous/anxious.    All other systems reviewed and are negative.      OARRS:  Blake Gupta, APRN-CNP on 11/7/2024 11:18 PM  I have personally reviewed the OARRS report for Nova ANDI Kohler. I have considered the risks of abuse, dependence, addiction and diversion    Is the patient prescribed a combination of a benzodiazepine and opioid?  No    Last Urine Drug Screen / ordered today: YES  Recent Results (from the past 8760 hours)   Methylphenidate And Metabolite,Conf,Urine    Collection Time: 06/27/24 11:25 AM   Result Value Ref Range    Ritalinic acid Urine >5000.0 ng/mL    Methylphenidate Urine Quantitative 2643.1 ng/mL   Drug Screen, Urine With Reflex to Confirmation    Collection Time: 06/27/24 11:25 AM   Result Value Ref Range    Amphetamine Screen, Urine Presumptive Negative Presumptive Negative    Barbiturate Screen, Urine Presumptive Negative Presumptive Negative    Benzodiazepines Screen, Urine Presumptive Negative Presumptive Negative    Cannabinoid Screen, Urine Presumptive Negative Presumptive Negative    Cocaine Metabolite Screen, Urine Presumptive Negative Presumptive Negative    Fentanyl Screen, Urine Presumptive Negative Presumptive Negative    Opiate Screen, Urine Presumptive Negative Presumptive Negative    Oxycodone Screen, Urine Presumptive Negative Presumptive Negative    PCP Screen, Urine Presumptive Negative Presumptive Negative    Methadone Screen, Urine Presumptive Negative Presumptive Negative       N/A    Clinical rationale for not completing a Urine Drug Screen: Reordered today    Controlled Substance Agreement:  Date of the Last Agreement: 05/30/2024    I attest to having ordered seen the CSA and ordering it for the following substance.    Stimulants:   What is the patient's goal of therapy? Improved ADHD, depression, and energy levels.  Is this being achieved with current  treatment? yes    Activities of Daily Living:   Is your overall impression that this patient is benefiting (symptom reduction outweighs side effects) from stimulant therapy? yes     1. Physical Functioning: Better  2. Family Relationship: Same  3. Social Relationship: Same  4. Mood: Better  5. Sleep Patterns: Same  6. Overall Function: Same      Objective   Mental Status Exam:  General Appearance: Well groomed, appropriate eye contact  Attitude/Behavior: Cooperative, Distracted  Motor: Fidgeting  Speech: Rapid Speech, pressured  Gait/Station: Other:(comment) (sitting in parked car, over virtual connection)  Mood: 'feeling great!'  Affect: Euthymic, full-range, Increased intensity, Anxious, Congruent with mood and topic of conversation  Thought Process: Linear, goal directed (racing, obsessive)  Thought Associations: No loosening of associations  Thought Content: Normal, Other: (comment) (glad she got approved to move into new apartment, and finds being on Wellbutrin is helping with her mood, but feels manic and elated but also wonders if it is because of having so many things on her mind, that she is dealing with many thoughts at once.)  Perception: No perceptual abnormalities noted  Sensorium: Alert and oriented to person, place, time and situation  Insight: Fair  Judgement: Intact  Cognition: Cognitively intact to conversational testing with respect to attention, orientation, fund of knowledge, recent and remote memory, and language  Testing: N/A    JESSICA-7/PHQ-9 scores reviewed: 16, 16 compared to 21, 22 reflecting increases in anxiety and depressive symptoms.    Current Medications:  Current Outpatient Medications on File Prior to Visit   Medication Sig Dispense Refill    albuterol 90 mcg/actuation inhaler Inhale 2 puffs every 4 hours if needed for wheezing or shortness of breath.      Allergy Relief, loratadine, 10 mg tablet Take 1 tablet (10 mg) by mouth if needed for allergies.      BreatheRite MDI Spacer  inhaler 1 each once daily as needed (for asthma relief PRN).      buPROPion XL (Wellbutrin XL) 300 mg 24 hr tablet Take 1 tablet (300 mg) by mouth once daily. Do not crush, chew, or split. 90 tablet 3    busPIRone (Buspar) 10 mg tablet Take 1 tablet (10 mg) by mouth once daily at noon. Take before meals.. 90 tablet 3    busPIRone (Buspar) 30 mg tablet Take 1 tablet (30 mg) by mouth 2 times a day. As directed 180 tablet 3    cholecalciferol (Vitamin D-3) 50 mcg (2,000 unit) capsule Take 1 capsule (50 mcg) by mouth early in the morning..      DULoxetine (Cymbalta) 60 mg DR capsule Take 1 capsule (60 mg) by mouth once daily. 90 capsule 3    hydrOXYzine HCL (Atarax) 25 mg tablet Take 1 tablet (25 mg) by mouth 3 times a day as needed.      hyoscyamine 0.125 mg disintegrating tablet Place 1 tablet (0.125 mg) under the tongue every 4 hours if needed (as needed).      lamoTRIgine (LaMICtal) 100 mg tablet Take 3 tablets (300 mg) by mouth once daily.  As Directed Take 1 tablet in the AM and 2 tablets at bedtime. 270 tablet 3    levonorgestrel (Mirena) 21 mcg/24 hours (8 yrs) 52 mg IUD by intrauterine route. Use as directed      magnesium 200 mg tablet Take 1 tablet (200 mg) by mouth as needed at bedtime (sleep aid).      methocarbamol (Robaxin) 500 mg tablet Take 1 tablet (500 mg) by mouth 3 times a day.      methylphenidate (Ritalin) 10 mg tablet Take 1 tablet (10 mg) by mouth 2 times a day. Do not fill before October 21, 2024. 60 tablet 0    ondansetron (Zofran) 4 mg tablet Take 1 tablet (4 mg) by mouth every 8 hours if needed for vomiting or nausea.      traZODone (Desyrel) 50 mg tablet Take 1-2 tablets ( mg) by mouth as needed at bedtime for sleep. 180 tablet 3    Ubrelvy 100 mg tablet tablet Take 1 tablet (100 mg) by mouth if needed (migraine).      methylphenidate (Ritalin) 10 mg tablet Take 1 tablet (10 mg) by mouth 2 times a day. Do not fill before September 21, 2024. 60 tablet 0     No current  facility-administered medications on file prior to visit.       Lab Review:   No visits with results within 2 Month(s) from this visit.   Latest known visit with results is:   Lab on 06/27/2024   Component Date Value    Amphetamine Screen, Urine 06/27/2024 Presumptive Negative     Barbiturate Screen, Urine 06/27/2024 Presumptive Negative     Benzodiazepines Screen, * 06/27/2024 Presumptive Negative     Cannabinoid Screen, Urine 06/27/2024 Presumptive Negative     Cocaine Metabolite Scree* 06/27/2024 Presumptive Negative     Fentanyl Screen, Urine 06/27/2024 Presumptive Negative     Opiate Screen, Urine 06/27/2024 Presumptive Negative     Oxycodone Screen, Urine 06/27/2024 Presumptive Negative     PCP Screen, Urine 06/27/2024 Presumptive Negative     Methadone Screen, Urine 06/27/2024 Presumptive Negative     Ritalinic acid Urine 06/27/2024 >5000.0     Methylphenidate Urine Qu* 06/27/2024 2643.1          Time Spent:    Prep time: 1 min.  Direct patient time: 28 min.  Documentation time: 6 min.  Total time: 35 min.    Next Appointment:  Follow up in 1 month (on 12/9/2024).

## 2024-11-07 RX ORDER — METHYLPHENIDATE HYDROCHLORIDE 10 MG/1
10 TABLET ORAL 2 TIMES DAILY
Qty: 60 TABLET | Refills: 0 | Status: SHIPPED | OUTPATIENT
Start: 2025-01-19 | End: 2025-02-18

## 2024-11-07 RX ORDER — METHYLPHENIDATE HYDROCHLORIDE 10 MG/1
10 TABLET ORAL 2 TIMES DAILY
Qty: 60 TABLET | Refills: 0 | Status: SHIPPED | OUTPATIENT
Start: 2024-11-20 | End: 2024-12-20

## 2024-11-07 RX ORDER — METHYLPHENIDATE HYDROCHLORIDE 10 MG/1
10 TABLET ORAL 2 TIMES DAILY
Qty: 60 TABLET | Refills: 0 | Status: SHIPPED | OUTPATIENT
Start: 2024-12-20 | End: 2025-01-19

## 2024-11-07 ASSESSMENT — ENCOUNTER SYMPTOMS
AGITATION: 1
SLEEP DISTURBANCE: 1

## 2024-11-07 ASSESSMENT — PATIENT HEALTH QUESTIONNAIRE - PHQ9
8. MOVING OR SPEAKING SO SLOWLY THAT OTHER PEOPLE COULD HAVE NOTICED. OR THE OPPOSITE, BEING SO FIGETY OR RESTLESS THAT YOU HAVE BEEN MOVING AROUND A LOT MORE THAN USUAL: MORE THAN HALF THE DAYS

## 2024-12-09 ENCOUNTER — APPOINTMENT (OUTPATIENT)
Dept: BEHAVIORAL HEALTH | Facility: CLINIC | Age: 39
End: 2024-12-09
Payer: COMMERCIAL

## 2024-12-09 DIAGNOSIS — F31.81 BIPOLAR 2 DISORDER (MULTI): ICD-10-CM

## 2024-12-09 DIAGNOSIS — F42.8 OBSESSIVE THINKING: ICD-10-CM

## 2024-12-09 DIAGNOSIS — F43.10 PTSD (POST-TRAUMATIC STRESS DISORDER): ICD-10-CM

## 2024-12-09 DIAGNOSIS — F41.9 ANXIETY: ICD-10-CM

## 2024-12-09 DIAGNOSIS — F41.1 GAD (GENERALIZED ANXIETY DISORDER): Primary | ICD-10-CM

## 2024-12-09 DIAGNOSIS — F33.2 SEVERE EPISODE OF RECURRENT MAJOR DEPRESSIVE DISORDER, WITHOUT PSYCHOTIC FEATURES (MULTI): ICD-10-CM

## 2024-12-09 DIAGNOSIS — F33.8 SEASONAL AFFECTIVE DISORDER (CMS-HCC): ICD-10-CM

## 2024-12-09 DIAGNOSIS — F31.9 BIPOLAR DEPRESSION (MULTI): ICD-10-CM

## 2024-12-09 RX ORDER — CETIRIZINE HYDROCHLORIDE 10 MG/1
1 TABLET, FILM COATED ORAL
COMMUNITY
Start: 2024-11-27

## 2024-12-09 RX ORDER — BUPROPION HYDROCHLORIDE 150 MG/1
150 TABLET ORAL DAILY
Qty: 60 TABLET | Refills: 0 | Status: SHIPPED | OUTPATIENT
Start: 2024-12-09 | End: 2025-02-07

## 2024-12-09 ASSESSMENT — ENCOUNTER SYMPTOMS
NERVOUS/ANXIOUS: 1
SLEEP DISTURBANCE: 1

## 2024-12-09 NOTE — PROGRESS NOTES
"Adult Ambulatory Psychiatry Progress Note      Assessment/Plan     Impression:  Nova Kohler is a 39 y.o. female domiciled  in process of , employed as [employer] , who presents for follow up with CC of \"I am in my new place and I love it. It is heaven. I feel not depressed, but I do feel spacey. I have the brain fog which is 'cool' but now it is worse. Even with the Methylphenidate, which gives me energy, but it doesn't give me the clarity of thought since the increase in Wellbutrin. Also I can't handle the sweating as it has been unbearable.\"    Plan: Patient was cooperative and and happier, admitting moving into new home was a huge stress relief for her, but while she was dealing with ongoing other stressors in her life, the biggest concerns were concentration/brain fog getting worse and constantly sweating which she feels has happened since going up on Wellbutrin. Reviewed and agreed to reducing dose of Wellbutrin, but leave other medications and treatment plan in place.      Medication: Methylphenidate (Focalin)10mg once daily. Reduces Wellbutrin XL 300mg to 150mg every day. Buspar 10mg to be taken middle of the day, late afternoon. Continues Buspar 30mg twice daily, Cymbalta 60mg every day, Atarax 25mg (up to 75mg) as needed for anxiety management, Lamictal 300mg every day, and Trazodone 50-100mg at bedtime.    Reviewed r/b/a, possible side effects of the medication. Client is aware about the benefit outweighs the risk.     Diagnoses and all orders for this visit:  JESSICA (generalized anxiety disorder)  -     DULoxetine (Cymbalta) 60 mg DR capsule; Take 1 capsule (60 mg) by mouth once daily.  -     busPIRone (Buspar) 30 mg tablet; Take 1 tablet (30 mg) by mouth 2 times a day. As directed  Bipolar 2 disorder (Multi)  -     buPROPion XL (Wellbutrin XL) 150 mg 24 hr tablet; Take 1 tablet (150 mg) by mouth once daily. Do not crush, chew, or split.  -     DULoxetine (Cymbalta) 60 mg DR " capsule; Take 1 capsule (60 mg) by mouth once daily.  Seasonal affective disorder (CMS-HCC)  -     buPROPion XL (Wellbutrin XL) 150 mg 24 hr tablet; Take 1 tablet (150 mg) by mouth once daily. Do not crush, chew, or split.  -     DULoxetine (Cymbalta) 60 mg DR capsule; Take 1 capsule (60 mg) by mouth once daily.  Severe episode of recurrent major depressive disorder, without psychotic features (Multi)  -     buPROPion XL (Wellbutrin XL) 150 mg 24 hr tablet; Take 1 tablet (150 mg) by mouth once daily. Do not crush, chew, or split.  Anxiety  Bipolar depression (Multi)  -     DULoxetine (Cymbalta) 60 mg DR capsule; Take 1 capsule (60 mg) by mouth once daily.  PTSD (post-traumatic stress disorder)  -     DULoxetine (Cymbalta) 60 mg DR capsule; Take 1 capsule (60 mg) by mouth once daily.  Obsessive thinking  -     DULoxetine (Cymbalta) 60 mg DR capsule; Take 1 capsule (60 mg) by mouth once daily.          Therapy: none    Other: n/a    Patient is reminded that if there is SI to call [phone] and get themselves to the closest ED for evaluation, otherwise contact me for other questions/concerns.     Subjective   HPI:    Virtual Consent    An interactive audio and video telecommunication system which permits real time communications between the patient (in parked car in front of [employer] store) and provider (at home office) was utilized to provide this telehealth service.   Verbal consent was requested and obtained from Nova Kohler on this date, 12/09/24 for a telehealth visit.     Present Illness - anxiety, depression     Characteristics/Recent psychiatric symptoms (pertinent positives and negatives) - reports noticing her anxiety and depression are improved even more so, now living in the new home that she moved into, soon after the last appt and has found it herself to be happy and glad to be there. Reports her son is still rodriguez and recently was suspended at school for saying inappropriate things at another  "classmate that was very upsetting. Reports she has to see her  today to go over divorce paperwork, and 'I cannot stand that man (her soon to be ex), who just says the wrong things to our son.' Reports noticing she is sweating whenever she is moving, or when the temperatures are warmer out (over 50F), she is sweating profusely and feels this started around the time of being on the Wellbutrin, but more so since the dose increase, and wants to reduce if not stop the medication, but still admits the Wellbutrin and dose increase has helped her mood. Also noted that she is finding herself to have more issues with brain fog, clarity of thoughts, putting words together, sentences together, since the dose increase in Wellbutrin along with the Focalin. Feels the Focalin works fine now, as it gives her energy and can focus, but the addition of Wellbutrin is causing the opposite impact on her attention/concentration. Reports sleep 'has been rough' but admits it is d/t having a cold currently and mouth breathing more, so has a dry mouth. Interrupted sleep yet 8 hours still and restless. Still feels mentally/physically fatigued but energy levels are 'fine'. Reports racing, obsessive thoughts 'were really bad up to a couple of days ago, where I wrote down all of my thoughts into a notebook and put them away, and that really helped and my brain calmed down. That helped a lot.' Appetite is 'me wanting to eat junk. Me wanting to eat sugar. Comfort food...\" Her current stressors are specifically her ex - Dominik (pending divorce) and her son - Sam and his behaviors and how she is trying to navigate the colunga managing both. S.A.D. is better.      Onset/timeframe - 4 weeks  Type - anxiety, concentration  Duration - daily  Aggravating and/or relieving factors/triggers - new home is alleviating a lot of stress in her life, but son's behaviors are getting out of control, Wellbutrin is causing her to sweat more than " ever.  Treatment and treatment changes (new meds, dosage increases or decreases, med compliance, therapy frequency, etc.) (Past and Recent) - Focalin 10mg BID, Wellbutrin XL 300mg QD, Buspar 30mg BID, Lamictal 300mg QD, Trazodone 50mg @[facility], Cymbalta 60mg QD. Atarax 25mg-75mg PRN/TID. Still looking for a new therapist.     Issues: Denies SI/HI/AVH, currently.            Review of Systems   Constitutional:  Positive for diaphoresis.   Psychiatric/Behavioral:  Positive for decreased concentration and sleep disturbance. The patient is nervous/anxious.        OARRS:  Blake Gupta, APRN-CNP on 12/9/2024 10:07 AM  I have personally reviewed the OARRS report for Nova Kohler. I have considered the risks of abuse, dependence, addiction and diversion    Is the patient prescribed a combination of a benzodiazepine and opioid?  No    Last Urine Drug Screen / ordered today: YES  Recent Results (from the past 8760 hours)   Methylphenidate And Metabolite,Conf,Urine    Collection Time: 06/27/24 11:25 AM   Result Value Ref Range    Ritalinic acid Urine >5000.0 ng/mL    Methylphenidate Urine Quantitative 2643.1 ng/mL   Drug Screen, Urine With Reflex to Confirmation    Collection Time: 06/27/24 11:25 AM   Result Value Ref Range    Amphetamine Screen, Urine Presumptive Negative Presumptive Negative    Barbiturate Screen, Urine Presumptive Negative Presumptive Negative    Benzodiazepines Screen, Urine Presumptive Negative Presumptive Negative    Cannabinoid Screen, Urine Presumptive Negative Presumptive Negative    Cocaine Metabolite Screen, Urine Presumptive Negative Presumptive Negative    Fentanyl Screen, Urine Presumptive Negative Presumptive Negative    Opiate Screen, Urine Presumptive Negative Presumptive Negative    Oxycodone Screen, Urine Presumptive Negative Presumptive Negative    PCP Screen, Urine Presumptive Negative Presumptive Negative    Methadone Screen, Urine Presumptive Negative Presumptive Negative        N/A    Clinical rationale for not completing a Urine Drug Screen: Reordered today    Controlled Substance Agreement:  Date of the Last Agreement: 05/30/2024    I attest to having ordered seen the CSA and ordering it for the following substance.    Stimulants:   What is the patient's goal of therapy? Improved ADHD, depression, and energy levels.  Is this being achieved with current treatment? yes    Activities of Daily Living:   Is your overall impression that this patient is benefiting (symptom reduction outweighs side effects) from stimulant therapy? yes     1. Physical Functioning: Better  2. Family Relationship: Same  3. Social Relationship: Same  4. Mood: Better  5. Sleep Patterns: Same  6. Overall Function: Same      Objective   Mental Status Exam:  General Appearance: Well groomed, appropriate eye contact  Attitude/Behavior: Cooperative, Distracted  Motor: No psychomotor agitation or retardation, no tremor or other abnormal movements  Speech: Rapid Speech, pressured  Gait/Station: Other:(comment) (sitting on couch, over virtual connection)  Mood: 'not bad but sweating is killer'  Affect: Euthymic, full-range, Anxious, Increased intensity, Congruent with mood and topic of conversation  Thought Process: Linear, goal directed, Flight of ideas  Thought Associations: No loosening of associations  Thought Content: Normal, Other: (comment) (happy being in home, but dealing with son's bad behaviors are becoming more problematic, divorce is moving forward, and feels uncomfortable with sweating from dose increase of Wellbutrin)  Perception: No perceptual abnormalities noted  Sensorium: Alert and oriented to person, place, time and situation  Insight: Intact  Judgement: Intact  Cognition: Cognitively intact to conversational testing with respect to attention, orientation, fund of knowledge, recent and remote memory, and language  Testing: N/A    JESSICA-7/PHQ-9 scores reviewed: 16, 16 compared to 21, 22 reflecting  increases in anxiety and depressive symptoms.    Current Medications:  Current Outpatient Medications on File Prior to Visit   Medication Sig Dispense Refill    albuterol 90 mcg/actuation inhaler Inhale 2 puffs every 4 hours if needed for wheezing or shortness of breath.      Allergy Relief, cetirizine, 10 mg tablet Take 1 tablet (10 mg) by mouth early in the morning..      Allergy Relief, loratadine, 10 mg tablet Take 1 tablet (10 mg) by mouth if needed for allergies.      BreatheRite MDI Spacer inhaler 1 each once daily as needed (for asthma relief PRN).      busPIRone (Buspar) 10 mg tablet Take 1 tablet (10 mg) by mouth once daily at noon. Take before meals.. 90 tablet 3    cholecalciferol (Vitamin D-3) 50 mcg (2,000 unit) capsule Take 1 capsule (50 mcg) by mouth early in the morning..      hydrOXYzine HCL (Atarax) 25 mg tablet Take 1 tablet (25 mg) by mouth 3 times a day as needed.      hyoscyamine 0.125 mg disintegrating tablet Place 1 tablet (0.125 mg) under the tongue every 4 hours if needed (as needed).      lamoTRIgine (LaMICtal) 100 mg tablet Take 3 tablets (300 mg) by mouth once daily.  As Directed Take 1 tablet in the AM and 2 tablets at bedtime. 270 tablet 3    levonorgestrel (Mirena) 21 mcg/24 hours (8 yrs) 52 mg IUD by intrauterine route. Use as directed      magnesium 200 mg tablet Take 1 tablet (200 mg) by mouth as needed at bedtime (sleep aid).      methocarbamol (Robaxin) 500 mg tablet Take 1 tablet (500 mg) by mouth 3 times a day.      [START ON 12/20/2024] methylphenidate (Ritalin) 10 mg tablet Take 1 tablet (10 mg) by mouth 2 times a day. Do not fill before December 20, 2024. 60 tablet 0    [START ON 1/19/2025] methylphenidate (Ritalin) 10 mg tablet Take 1 tablet (10 mg) by mouth 2 times a day. Do not fill before January 19, 2025. 60 tablet 0    ondansetron (Zofran) 4 mg tablet Take 1 tablet (4 mg) by mouth every 8 hours if needed for vomiting or nausea.      traZODone (Desyrel) 50 mg tablet  Take 1-2 tablets ( mg) by mouth as needed at bedtime for sleep. 180 tablet 3    Ubrelvy 100 mg tablet tablet Take 1 tablet (100 mg) by mouth if needed (migraine).      [DISCONTINUED] buPROPion XL (Wellbutrin XL) 300 mg 24 hr tablet Take 1 tablet (300 mg) by mouth once daily. Do not crush, chew, or split. 90 tablet 3    [DISCONTINUED] busPIRone (Buspar) 30 mg tablet Take 1 tablet (30 mg) by mouth 2 times a day. As directed 180 tablet 3    [DISCONTINUED] DULoxetine (Cymbalta) 60 mg DR capsule Take 1 capsule (60 mg) by mouth once daily. 90 capsule 3    [DISCONTINUED] methylphenidate (Ritalin) 10 mg tablet Take 1 tablet (10 mg) by mouth 2 times a day. Do not fill before September 21, 2024. 60 tablet 0    [DISCONTINUED] methylphenidate (Ritalin) 10 mg tablet Take 1 tablet (10 mg) by mouth 2 times a day. Do not fill before October 21, 2024. 60 tablet 0    [DISCONTINUED] methylphenidate (Ritalin) 10 mg tablet Take 1 tablet (10 mg) by mouth 2 times a day. Do not fill before November 20, 2024. (Patient not taking: Reported on 12/9/2024) 60 tablet 0     No current facility-administered medications on file prior to visit.       Lab Review:   No visits with results within 2 Month(s) from this visit.   Latest known visit with results is:   Lab on 06/27/2024   Component Date Value    Amphetamine Screen, Urine 06/27/2024 Presumptive Negative     Barbiturate Screen, Urine 06/27/2024 Presumptive Negative     Benzodiazepines Screen, * 06/27/2024 Presumptive Negative     Cannabinoid Screen, Urine 06/27/2024 Presumptive Negative     Cocaine Metabolite Scree* 06/27/2024 Presumptive Negative     Fentanyl Screen, Urine 06/27/2024 Presumptive Negative     Opiate Screen, Urine 06/27/2024 Presumptive Negative     Oxycodone Screen, Urine 06/27/2024 Presumptive Negative     PCP Screen, Urine 06/27/2024 Presumptive Negative     Methadone Screen, Urine 06/27/2024 Presumptive Negative     Ritalinic acid Urine 06/27/2024 >5000.0      Methylphenidate Urine Qu* 06/27/2024 2643.1          Time Spent:    Prep time: 1 min.  Direct patient time: 25 min.  Documentation time: 6 min.  Total time: 32 min.    Next Appointment:  Follow up in 6 weeks (on 1/23/2025).

## 2024-12-14 RX ORDER — DULOXETIN HYDROCHLORIDE 60 MG/1
60 CAPSULE, DELAYED RELEASE ORAL DAILY
Qty: 90 CAPSULE | Refills: 3 | Status: SHIPPED | OUTPATIENT
Start: 2024-12-14 | End: 2025-12-14

## 2024-12-14 RX ORDER — BUSPIRONE HYDROCHLORIDE 30 MG/1
30 TABLET ORAL 2 TIMES DAILY
Qty: 180 TABLET | Refills: 3 | Status: SHIPPED | OUTPATIENT
Start: 2024-12-14 | End: 2025-12-14

## 2024-12-14 ASSESSMENT — ENCOUNTER SYMPTOMS
DECREASED CONCENTRATION: 1
DIAPHORESIS: 1

## 2025-01-23 ENCOUNTER — APPOINTMENT (OUTPATIENT)
Dept: BEHAVIORAL HEALTH | Facility: CLINIC | Age: 40
End: 2025-01-23
Payer: COMMERCIAL

## 2025-01-23 DIAGNOSIS — F43.10 PTSD (POST-TRAUMATIC STRESS DISORDER): ICD-10-CM

## 2025-01-23 DIAGNOSIS — F51.5 NIGHTMARES ASSOCIATED WITH CHRONIC POST-TRAUMATIC STRESS DISORDER: ICD-10-CM

## 2025-01-23 DIAGNOSIS — F31.81 BIPOLAR 2 DISORDER (MULTI): ICD-10-CM

## 2025-01-23 DIAGNOSIS — F42.8 OBSESSIVE THINKING: ICD-10-CM

## 2025-01-23 DIAGNOSIS — F43.12 NIGHTMARES ASSOCIATED WITH CHRONIC POST-TRAUMATIC STRESS DISORDER: ICD-10-CM

## 2025-01-23 DIAGNOSIS — G47.9 SLEEP DIFFICULTIES: ICD-10-CM

## 2025-01-23 DIAGNOSIS — F90.2 ATTENTION DEFICIT HYPERACTIVITY DISORDER, COMBINED TYPE: ICD-10-CM

## 2025-01-23 DIAGNOSIS — F41.1 GAD (GENERALIZED ANXIETY DISORDER): Primary | ICD-10-CM

## 2025-01-23 PROCEDURE — G2211 COMPLEX E/M VISIT ADD ON: HCPCS | Performed by: NURSE PRACTITIONER

## 2025-01-23 PROCEDURE — 99214 OFFICE O/P EST MOD 30 MIN: CPT | Performed by: NURSE PRACTITIONER

## 2025-01-23 RX ORDER — BUPROPION HYDROCHLORIDE 100 MG/1
100 TABLET, EXTENDED RELEASE ORAL DAILY
Qty: 60 TABLET | Refills: 0 | Status: SHIPPED | OUTPATIENT
Start: 2025-01-23 | End: 2025-03-24

## 2025-01-23 ASSESSMENT — ENCOUNTER SYMPTOMS
SLEEP DISTURBANCE: 1
DECREASED CONCENTRATION: 1
NERVOUS/ANXIOUS: 1

## 2025-01-23 NOTE — PROGRESS NOTES
"Adult Ambulatory Psychiatry Progress Note      Assessment/Plan     Impression:  Nova Kohler is a 39 y.o. female domiciled  in process of , employed as [employer] , who presents for follow up with CC of \"I am same old, same old. I feel ok with the Wellbutrin, even with lowering it, but I would like to go lower because of the sweating.\"    Plan: Patient was cooperative and feeling slightly better with having settled into new home, along with changes in approach to handling her son,   and getting bankruptcy moving forward finally. Still dealing with excess sweating even on lowered dose of Wellbutrin, so after reviewing agreed to switching Wellbutrin from XL to SR but also increase Trazodone to help with sleep. No other changes to other medications or to treatment plan in place.      Medication: Methylphenidate (Focalin)10mg once daily. Switches Wellbutrin XL 150mg to SR 100mg every day. Buspar 10mg to be taken middle of the day, late afternoon. Continues Buspar 30mg twice daily, Cymbalta 60mg every day, Atarax 25mg (up to 75mg) as needed for anxiety management, Lamictal 300mg every day, and Trazodone 50-75mg at bedtime.    Reviewed r/b/a, possible side effects of the medication. Client is aware about the benefit outweighs the risk.     Diagnoses and all orders for this visit:  JESSICA (generalized anxiety disorder)  Bipolar 2 disorder (Multi)  -     buPROPion SR (Wellbutrin SR) 100 mg 12 hr tablet; Take 1 tablet (100 mg) by mouth once daily. Do not crush, chew, or split.  Attention deficit hyperactivity disorder, combined type  -     methylphenidate (Ritalin) 10 mg tablet; Take 1 tablet (10 mg) by mouth 2 times a day. Do not fill before February 18, 2025.  -     methylphenidate (Ritalin) 10 mg tablet; Take 1 tablet (10 mg) by mouth 2 times a day. Do not fill before March 20, 2025.  -     methylphenidate (Ritalin) 10 mg tablet; Take 1 tablet (10 mg) by mouth 2 times a day. Do not " fill before April 19, 2025.  Obsessive thinking  PTSD (post-traumatic stress disorder)  Nightmares associated with chronic post-traumatic stress disorder  Sleep difficulties            Therapy: none    Other: n/a    Patient is reminded that if there is SI to call [phone] and get themselves to the closest ED for evaluation, otherwise contact me for other questions/concerns.     Subjective   HPI:    Virtual Consent    An interactive audio and video telecommunication system which permits real time communications between the patient (in home) and provider (at home office) was utilized to provide this telehealth service.   Verbal consent was requested and obtained from Nova Kohler on this date, 01/22/2025 for a telehealth visit.     Present Illness - anxiety, depression     Characteristics/Recent psychiatric symptoms (pertinent positives and negatives) - admits depression is around when her son is not around - as he keeps her motivated to do things, and ties it to 'body doubling', a component of ADHD where when someone else is around, such as her son, she feels more motivated to cook for herself and him, she will clean the house, she will get him to study and read books, but when he is not home, she misses his presence and feels sad, lonely. Admits a change in his behavior where he used to argue, be oppositional to her, be nasty and rude, and she realized that by no longer fighting with him, and 'picking the battles with him, and the non-negotiables in my house like every night at 9am his phone and tablet go with me into my room, are helping us be together. I also allow him to cook with me, and he enjoys doing that with me, and I will even play video games with him that we both enjoy playing together and sometimes with his own friends online. I just realize that when I don't fight him, he realizes he has nothing to push back on and gives up.' Reports since she has split custody of her son, with her ex, she does get  downtime for herself to recover and has given herself the space to gather her thoughts and know how to better regulate her own emotions/responses when he is around, 'and we are doing better actually, since I changed how I respond to him.' Reports with reduced dose of Wellbutrin, which was done to reduce the excessive sweating, her depression is around because of loneliness, and she is trying to learn to be ok with this, and doing more activities for herself, getting outside when jose, and less 'screen time' for herself, but again when her son is around she feels happy. Denies renate. Reports anxiety has been 'up but now not constantly.' Admits her anxiety is also down when her son, Sam, is around. Anxiety does leave her on edge, feeling worried and hypervigilant feeling. Bankruptcy is filed for her and her soon to be ex, that will finally allow the divorce to move forward (her current car will be part of the bankruptcy as the car note is too high and will buy a cheap used car instead), and have it done in February. Admits to increased nightmares of her ex, being around, trying to sexually impose himself on her, which disgusts her, when she recalls the dreams/nightmares, and other dreams are of people chasing her and trying to get her, leaves her with more restless sleep. Total sleep is still 8 hours, but interrupted and on Trazodone 50mg nightly. Energy levels are stable and mental/physical fatigue are 'fine' but when her son is around, yet get worse when he is gone. Admits her thoughts are messed up even with the Ritalin and scattered, even when her son is around or not, and that has been getting worse for the last month. Reports the holidays were 'blah' and admits to racing, obsessive thoughts about everything going on currently. Mother has been in the hospital twice over the past month and it weighs on her mind (HR was extremely low, but now stable yet the entire situation was stressful).        Onset/timeframe - 4 weeks  Type - anxiety, depression  Duration - daily  Aggravating and/or relieving factors/triggers - stress in life with son is getting better, and now that divorce is moving forward and can get bankruptcy done, feels her life is slowly becoming less stressful, and while reduced dose of Wellbutrin is alleviating less sweat, still finds it to be uncomfortable.  Treatment and treatment changes (new meds, dosage increases or decreases, med compliance, therapy frequency, etc.) (Past and Recent) - Focalin 10mg BID, Wellbutrin XL 150mg QD, Buspar 30mg BID, Lamictal 300mg QD, Trazodone 50mg @[facility], Cymbalta 60mg QD. Atarax 25mg-75mg PRN/TID. Still looking for a new therapist.     Issues: Denies SI/HI/AVH, currently.            Review of Systems   Constitutional:  Positive for diaphoresis.   Psychiatric/Behavioral:  Positive for decreased concentration and sleep disturbance. The patient is nervous/anxious.        O[facility]RS:  Blake Gupta, APRN-CNP on 1/23/2025  9:58 AM  I have personally reviewed the OARRS report for Nova Kohler. I have considered the risks of abuse, dependence, addiction and diversion    Is the patient prescribed a combination of a benzodiazepine and opioid?  No    Last Urine Drug Screen / ordered today: YES  Recent Results (from the past 8760 hours)   Methylphenidate And Metabolite,Conf,Urine    Collection Time: 06/27/24 11:25 AM   Result Value Ref Range    Ritalinic acid Urine >5000.0 ng/mL    Methylphenidate Urine Quantitative 2643.1 ng/mL   Drug Screen, Urine With Reflex to Confirmation    Collection Time: 06/27/24 11:25 AM   Result Value Ref Range    Amphetamine Screen, Urine Presumptive Negative Presumptive Negative    Barbiturate Screen, Urine Presumptive Negative Presumptive Negative    Benzodiazepines Screen, Urine Presumptive Negative Presumptive Negative    Cannabinoid Screen, Urine Presumptive Negative Presumptive Negative    Cocaine Metabolite Screen, Urine Presumptive  Negative Presumptive Negative    Fentanyl Screen, Urine Presumptive Negative Presumptive Negative    Opiate Screen, Urine Presumptive Negative Presumptive Negative    Oxycodone Screen, Urine Presumptive Negative Presumptive Negative    PCP Screen, Urine Presumptive Negative Presumptive Negative    Methadone Screen, Urine Presumptive Negative Presumptive Negative       N/A    Clinical rationale for not completing a Urine Drug Screen: Reordered today    Controlled Substance Agreement:  Date of the Last Agreement: 05/30/2024    I attest to having ordered seen the CSA and ordering it for the following substance.    Stimulants:   What is the patient's goal of therapy? Improved ADHD, depression, and energy levels.  Is this being achieved with current treatment? yes    Activities of Daily Living:   Is your overall impression that this patient is benefiting (symptom reduction outweighs side effects) from stimulant therapy? yes     1. Physical Functioning: Better  2. Family Relationship: Same  3. Social Relationship: Same  4. Mood: Better  5. Sleep Patterns: Worse  6. Overall Function: Same      Objective   Mental Status Exam:  General Appearance: Well groomed, appropriate eye contact  Attitude/Behavior: Cooperative, Distracted  Motor: No psychomotor agitation or retardation, no tremor or other abnormal movements  Speech: Normal rate, volume, prosody  Gait/Station: Other:(comment) (sitting in living room at home, over virtual connection)  Mood: 'doing ok'  Affect: Euthymic, full-range, Anxious, Congruent with mood and topic of conversation  Thought Process: Flight of ideas (obsessive)  Thought Associations: No loosening of associations  Thought Content: Normal, Other: (comment) (finalizing divorce, bankruptcy can move forward, dealing with son (not fighting his behaviors) has gotten better and trying to be more positive)  Perception: No perceptual abnormalities noted  Sensorium: Alert and oriented to person, place, time and  situation  Insight: Fair  Judgement: Intact  Cognition: Cognitively intact to conversational testing with respect to attention, orientation, fund of knowledge, recent and remote memory, and language  Testing: N/A    JESSICA-7/PHQ-9 scores reviewed: 16, 16 compared to 21, 22 reflecting increases in anxiety and depressive symptoms.    Current Medications:  Current Outpatient Medications on File Prior to Visit   Medication Sig Dispense Refill    albuterol 90 mcg/actuation inhaler Inhale 2 puffs every 4 hours if needed for wheezing or shortness of breath.      Allergy Relief, cetirizine, 10 mg tablet Take 1 tablet (10 mg) by mouth early in the morning..      Allergy Relief, loratadine, 10 mg tablet Take 1 tablet (10 mg) by mouth if needed for allergies.      BreatheRite MDI Spacer inhaler 1 each once daily as needed (for asthma relief PRN).      busPIRone (Buspar) 10 mg tablet Take 1 tablet (10 mg) by mouth once daily at noon. Take before meals.. 90 tablet 3    busPIRone (Buspar) 30 mg tablet Take 1 tablet (30 mg) by mouth 2 times a day. As directed 180 tablet 3    cholecalciferol (Vitamin D-3) 50 mcg (2,000 unit) capsule Take 1 capsule (50 mcg) by mouth early in the morning..      DULoxetine (Cymbalta) 60 mg DR capsule Take 1 capsule (60 mg) by mouth once daily. 90 capsule 3    hydrOXYzine HCL (Atarax) 25 mg tablet Take 1 tablet (25 mg) by mouth 3 times a day as needed.      hyoscyamine 0.125 mg disintegrating tablet Place 1 tablet (0.125 mg) under the tongue every 4 hours if needed (as needed).      lamoTRIgine (LaMICtal) 100 mg tablet Take 3 tablets (300 mg) by mouth once daily.  As Directed Take 1 tablet in the AM and 2 tablets at bedtime. 270 tablet 3    levonorgestrel (Mirena) 21 mcg/24 hours (8 yrs) 52 mg IUD by intrauterine route. Use as directed      magnesium 200 mg tablet Take 1 tablet (200 mg) by mouth as needed at bedtime (sleep aid).      methocarbamol (Robaxin) 500 mg tablet Take 1 tablet (500 mg) by mouth 3  times a day.      methylphenidate (Ritalin) 10 mg tablet Take 1 tablet (10 mg) by mouth 2 times a day. Do not fill before January 19, 2025. 60 tablet 0    ondansetron (Zofran) 4 mg tablet Take 1 tablet (4 mg) by mouth every 8 hours if needed for vomiting or nausea.      Ubrelvy 100 mg tablet tablet Take 1 tablet (100 mg) by mouth if needed (migraine).      traZODone (Desyrel) 50 mg tablet Take 1-2 tablets ( mg) by mouth as needed at bedtime for sleep. 180 tablet 3    [DISCONTINUED] buPROPion XL (Wellbutrin XL) 150 mg 24 hr tablet Take 1 tablet (150 mg) by mouth once daily. Do not crush, chew, or split. 60 tablet 0    [DISCONTINUED] methylphenidate (Ritalin) 10 mg tablet Take 1 tablet (10 mg) by mouth 2 times a day. Do not fill before December 20, 2024. 60 tablet 0     No current facility-administered medications on file prior to visit.       Lab Review:   No visits with results within 2 Month(s) from this visit.   Latest known visit with results is:   Lab on 06/27/2024   Component Date Value    Amphetamine Screen, Urine 06/27/2024 Presumptive Negative     Barbiturate Screen, Urine 06/27/2024 Presumptive Negative     Benzodiazepines Screen, * 06/27/2024 Presumptive Negative     Cannabinoid Screen, Urine 06/27/2024 Presumptive Negative     Cocaine Metabolite Scree* 06/27/2024 Presumptive Negative     Fentanyl Screen, Urine 06/27/2024 Presumptive Negative     Opiate Screen, Urine 06/27/2024 Presumptive Negative     Oxycodone Screen, Urine 06/27/2024 Presumptive Negative     PCP Screen, Urine 06/27/2024 Presumptive Negative     Methadone Screen, Urine 06/27/2024 Presumptive Negative     Ritalinic acid Urine 06/27/2024 >5000.0     Methylphenidate Urine Qu* 06/27/2024 2643.1          Time Spent:    Prep time: 1 min.  Direct patient time: 30 min.  Documentation time: 6 min.  Total time: 37 min.    Next Appointment:  Follow up in 7 weeks (on 3/12/2025).

## 2025-01-27 RX ORDER — METHYLPHENIDATE HYDROCHLORIDE 10 MG/1
10 TABLET ORAL 2 TIMES DAILY
Qty: 60 TABLET | Refills: 0 | Status: SHIPPED | OUTPATIENT
Start: 2025-03-20 | End: 2025-04-19

## 2025-01-27 RX ORDER — METHYLPHENIDATE HYDROCHLORIDE 10 MG/1
10 TABLET ORAL 2 TIMES DAILY
Qty: 60 TABLET | Refills: 0 | Status: SHIPPED | OUTPATIENT
Start: 2025-04-19 | End: 2025-05-19

## 2025-01-27 RX ORDER — METHYLPHENIDATE HYDROCHLORIDE 10 MG/1
10 TABLET ORAL 2 TIMES DAILY
Qty: 60 TABLET | Refills: 0 | Status: SHIPPED | OUTPATIENT
Start: 2025-02-18 | End: 2025-03-20

## 2025-01-27 ASSESSMENT — ENCOUNTER SYMPTOMS: DIAPHORESIS: 1

## 2025-03-12 ENCOUNTER — APPOINTMENT (OUTPATIENT)
Dept: BEHAVIORAL HEALTH | Facility: CLINIC | Age: 40
End: 2025-03-12
Payer: COMMERCIAL

## 2025-03-12 DIAGNOSIS — F51.5 NIGHTMARES ASSOCIATED WITH CHRONIC POST-TRAUMATIC STRESS DISORDER: ICD-10-CM

## 2025-03-12 DIAGNOSIS — F33.2 SEVERE EPISODE OF RECURRENT MAJOR DEPRESSIVE DISORDER, WITHOUT PSYCHOTIC FEATURES (MULTI): Primary | ICD-10-CM

## 2025-03-12 DIAGNOSIS — F90.2 ATTENTION DEFICIT HYPERACTIVITY DISORDER, COMBINED TYPE: ICD-10-CM

## 2025-03-12 DIAGNOSIS — F42.8 OBSESSIVE THINKING: ICD-10-CM

## 2025-03-12 DIAGNOSIS — F43.10 PTSD (POST-TRAUMATIC STRESS DISORDER): ICD-10-CM

## 2025-03-12 DIAGNOSIS — F41.1 GAD (GENERALIZED ANXIETY DISORDER): ICD-10-CM

## 2025-03-12 DIAGNOSIS — G47.9 SLEEP DIFFICULTIES: ICD-10-CM

## 2025-03-12 DIAGNOSIS — R61 EXCESSIVE SWEATING: ICD-10-CM

## 2025-03-12 DIAGNOSIS — F31.81 BIPOLAR 2 DISORDER (MULTI): ICD-10-CM

## 2025-03-12 DIAGNOSIS — F43.12 NIGHTMARES ASSOCIATED WITH CHRONIC POST-TRAUMATIC STRESS DISORDER: ICD-10-CM

## 2025-03-12 PROCEDURE — G2211 COMPLEX E/M VISIT ADD ON: HCPCS | Performed by: NURSE PRACTITIONER

## 2025-03-12 PROCEDURE — 99214 OFFICE O/P EST MOD 30 MIN: CPT | Performed by: NURSE PRACTITIONER

## 2025-03-12 RX ORDER — BUPROPION HYDROCHLORIDE 150 MG/1
150 TABLET ORAL EVERY MORNING
Qty: 30 TABLET | Refills: 11 | Status: SHIPPED | OUTPATIENT
Start: 2025-03-12 | End: 2026-03-12

## 2025-03-12 RX ORDER — BENZTROPINE MESYLATE 0.5 MG/1
0.5 TABLET ORAL 2 TIMES DAILY
Qty: 120 TABLET | Refills: 0 | Status: SHIPPED | OUTPATIENT
Start: 2025-03-12 | End: 2025-05-11

## 2025-03-12 RX ORDER — MOMETASONE FUROATE AND FORMOTEROL FUMARATE DIHYDRATE 200; 5 UG/1; UG/1
2 AEROSOL RESPIRATORY (INHALATION)
COMMUNITY
Start: 2025-03-04

## 2025-03-12 RX ORDER — FLUTICASONE PROPIONATE 50 MCG
1 SPRAY, SUSPENSION (ML) NASAL DAILY
COMMUNITY
Start: 2025-03-04

## 2025-03-12 ASSESSMENT — ANXIETY QUESTIONNAIRES
2. NOT BEING ABLE TO STOP OR CONTROL WORRYING: NEARLY EVERY DAY
7. FEELING AFRAID AS IF SOMETHING AWFUL MIGHT HAPPEN: NEARLY EVERY DAY
GAD7 TOTAL SCORE: 20
5. BEING SO RESTLESS THAT IT IS HARD TO SIT STILL: NEARLY EVERY DAY
IF YOU CHECKED OFF ANY PROBLEMS ON THIS QUESTIONNAIRE, HOW DIFFICULT HAVE THESE PROBLEMS MADE IT FOR YOU TO DO YOUR WORK, TAKE CARE OF THINGS AT HOME, OR GET ALONG WITH OTHER PEOPLE: EXTREMELY DIFFICULT
1. FEELING NERVOUS, ANXIOUS, OR ON EDGE: NEARLY EVERY DAY
3. WORRYING TOO MUCH ABOUT DIFFERENT THINGS: NEARLY EVERY DAY
6. BECOMING EASILY ANNOYED OR IRRITABLE: MORE THAN HALF THE DAYS
4. TROUBLE RELAXING: NEARLY EVERY DAY

## 2025-03-12 ASSESSMENT — PATIENT HEALTH QUESTIONNAIRE - PHQ9
3. TROUBLE FALLING OR STAYING ASLEEP OR SLEEPING TOO MUCH: MORE THAN HALF THE DAYS
4. FEELING TIRED OR HAVING LITTLE ENERGY: NEARLY EVERY DAY
6. FEELING BAD ABOUT YOURSELF - OR THAT YOU ARE A FAILURE OR HAVE LET YOURSELF OR YOUR FAMILY DOWN: MORE THAN HALF THE DAYS
1. LITTLE INTEREST OR PLEASURE IN DOING THINGS: MORE THAN HALF THE DAYS
10. IF YOU CHECKED OFF ANY PROBLEMS, HOW DIFFICULT HAVE THESE PROBLEMS MADE IT FOR YOU TO DO YOUR WORK, TAKE CARE OF THINGS AT HOME, OR GET ALONG WITH OTHER PEOPLE: EXTREMELY DIFFICULT
2. FEELING DOWN, DEPRESSED OR HOPELESS: MORE THAN HALF THE DAYS
5. POOR APPETITE OR OVEREATING: NEARLY EVERY DAY
9. THOUGHTS THAT YOU WOULD BE BETTER OFF DEAD, OR OF HURTING YOURSELF: NOT AT ALL
7. TROUBLE CONCENTRATING ON THINGS, SUCH AS READING THE NEWSPAPER OR WATCHING TELEVISION: NEARLY EVERY DAY
8. MOVING OR SPEAKING SO SLOWLY THAT OTHER PEOPLE COULD HAVE NOTICED. OR THE OPPOSITE, BEING SO FIGETY OR RESTLESS THAT YOU HAVE BEEN MOVING AROUND A LOT MORE THAN USUAL: MORE THAN HALF THE DAYS

## 2025-03-12 ASSESSMENT — ENCOUNTER SYMPTOMS
SLEEP DISTURBANCE: 1
NERVOUS/ANXIOUS: 1
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
## 2025-04-16 ENCOUNTER — APPOINTMENT (OUTPATIENT)
Dept: BEHAVIORAL HEALTH | Facility: CLINIC | Age: 40
End: 2025-04-16
Payer: COMMERCIAL

## 2025-04-16 DIAGNOSIS — F43.10 PTSD (POST-TRAUMATIC STRESS DISORDER): ICD-10-CM

## 2025-04-16 DIAGNOSIS — G47.9 SLEEP DIFFICULTIES: ICD-10-CM

## 2025-04-16 DIAGNOSIS — F41.1 GAD (GENERALIZED ANXIETY DISORDER): ICD-10-CM

## 2025-04-16 DIAGNOSIS — F42.8 OBSESSIVE THINKING: ICD-10-CM

## 2025-04-16 DIAGNOSIS — F33.8 SEASONAL AFFECTIVE DISORDER: ICD-10-CM

## 2025-04-16 DIAGNOSIS — F51.5 NIGHTMARES ASSOCIATED WITH CHRONIC POST-TRAUMATIC STRESS DISORDER: ICD-10-CM

## 2025-04-16 DIAGNOSIS — F33.2 SEVERE EPISODE OF RECURRENT MAJOR DEPRESSIVE DISORDER, WITHOUT PSYCHOTIC FEATURES (MULTI): Primary | ICD-10-CM

## 2025-04-16 DIAGNOSIS — F43.12 NIGHTMARES ASSOCIATED WITH CHRONIC POST-TRAUMATIC STRESS DISORDER: ICD-10-CM

## 2025-04-16 DIAGNOSIS — F90.2 ATTENTION DEFICIT HYPERACTIVITY DISORDER, COMBINED TYPE: ICD-10-CM

## 2025-04-16 DIAGNOSIS — F31.81 BIPOLAR 2 DISORDER (MULTI): ICD-10-CM

## 2025-04-16 PROCEDURE — G2211 COMPLEX E/M VISIT ADD ON: HCPCS | Performed by: NURSE PRACTITIONER

## 2025-04-16 PROCEDURE — 99214 OFFICE O/P EST MOD 30 MIN: CPT | Performed by: NURSE PRACTITIONER

## 2025-04-16 RX ORDER — BUPROPION HYDROCHLORIDE 300 MG/1
300 TABLET ORAL EVERY MORNING
Qty: 90 TABLET | Refills: 3 | Status: SHIPPED | OUTPATIENT
Start: 2025-04-16 | End: 2026-04-16

## 2025-04-16 RX ORDER — ERENUMAB-AOOE 70 MG/ML
70 INJECTION SUBCUTANEOUS
COMMUNITY
Start: 2025-04-07

## 2025-04-16 ASSESSMENT — ENCOUNTER SYMPTOMS
NERVOUS/ANXIOUS: 1
SLEEP DISTURBANCE: 1

## 2025-04-16 ASSESSMENT — ANXIETY QUESTIONNAIRES
3. WORRYING TOO MUCH ABOUT DIFFERENT THINGS: NEARLY EVERY DAY
7. FEELING AFRAID AS IF SOMETHING AWFUL MIGHT HAPPEN: MORE THAN HALF THE DAYS
IF YOU CHECKED OFF ANY PROBLEMS ON THIS QUESTIONNAIRE, HOW DIFFICULT HAVE THESE PROBLEMS MADE IT FOR YOU TO DO YOUR WORK, TAKE CARE OF THINGS AT HOME, OR GET ALONG WITH OTHER PEOPLE: EXTREMELY DIFFICULT
GAD7 TOTAL SCORE: 17
4. TROUBLE RELAXING: NEARLY EVERY DAY
2. NOT BEING ABLE TO STOP OR CONTROL WORRYING: NEARLY EVERY DAY
1. FEELING NERVOUS, ANXIOUS, OR ON EDGE: MORE THAN HALF THE DAYS
5. BEING SO RESTLESS THAT IT IS HARD TO SIT STILL: NEARLY EVERY DAY
6. BECOMING EASILY ANNOYED OR IRRITABLE: SEVERAL DAYS

## 2025-04-16 ASSESSMENT — PATIENT HEALTH QUESTIONNAIRE - PHQ9
2. FEELING DOWN, DEPRESSED OR HOPELESS: NEARLY EVERY DAY
3. TROUBLE FALLING OR STAYING ASLEEP OR SLEEPING TOO MUCH: NEARLY EVERY DAY
7. TROUBLE CONCENTRATING ON THINGS, SUCH AS READING THE NEWSPAPER OR WATCHING TELEVISION: NEARLY EVERY DAY
8. MOVING OR SPEAKING SO SLOWLY THAT OTHER PEOPLE COULD HAVE NOTICED. OR THE OPPOSITE, BEING SO FIGETY OR RESTLESS THAT YOU HAVE BEEN MOVING AROUND A LOT MORE THAN USUAL: MORE THAN HALF THE DAYS
4. FEELING TIRED OR HAVING LITTLE ENERGY: NEARLY EVERY DAY
9. THOUGHTS THAT YOU WOULD BE BETTER OFF DEAD, OR OF HURTING YOURSELF: NOT AT ALL
1. LITTLE INTEREST OR PLEASURE IN DOING THINGS: NEARLY EVERY DAY
10. IF YOU CHECKED OFF ANY PROBLEMS, HOW DIFFICULT HAVE THESE PROBLEMS MADE IT FOR YOU TO DO YOUR WORK, TAKE CARE OF THINGS AT HOME, OR GET ALONG WITH OTHER PEOPLE: EXTREMELY DIFFICULT
5. POOR APPETITE OR OVEREATING: SEVERAL DAYS
6. FEELING BAD ABOUT YOURSELF - OR THAT YOU ARE A FAILURE OR HAVE LET YOURSELF OR YOUR FAMILY DOWN: NEARLY EVERY DAY

## 2025-04-16 NOTE — PROGRESS NOTES
"Adult Ambulatory Psychiatry Progress Note      Assessment/Plan     Impression:  Nova Kohler is a 39 y.o. female domiciled  in process of , employed as [employer] , who presents for follow up with CC of \"Divorce is basically agreed upon. His  has the contract drawn up and can't wait for this to be done. Child support is different and Dominik thinks that will be done in 2 years but oh well. I've been having bad dreams every night lately, but no nightmares.    Plan: Patient was cooperative but reserved, and sad. Sweating resolved predominantly due to cool weather, but switching to Wellbutrin XL feels anxiety getting better, but depression worsening. Included extra stressors in her life is complicating her depression. Had not started Cogentin due to no sweating. Reviewed and agreed to increasing Wellbutrin, and have Cogentin available if sweating starts, but leave other medications and treatment plan in place. Put in referral for a therapist as her own therapist is having personal health issues that have caused her to cancel appointments.    Medication: Methylphenidate (Focalin) 10mg once daily. Increases Wellbutrin XL 150mg to 300mg every day. Has available Cogentin 0.5mg daily or at bedtime for sweat management. Buspar 10mg to be taken middle of the day, late afternoon. Continues Buspar 30mg twice daily, Cymbalta 60mg every day, Atarax 25mg (up to 75mg) as needed for anxiety management, Lamictal 300mg every day, and Trazodone 50-75mg at bedtime.    Reviewed r/b/a, possible side effects of the medication. Client is aware about the benefit outweighs the risk.     Diagnoses and all orders for this visit:  Severe episode of recurrent major depressive disorder, without psychotic features (Multi)  -     buPROPion XL (Wellbutrin XL) 300 mg 24 hr tablet; Take 1 tablet (300 mg) by mouth once daily in the morning. Do not crush, chew, or split.  JESSICA (generalized anxiety disorder)  -     Referral " to Psychology; Future  -     buPROPion XL (Wellbutrin XL) 300 mg 24 hr tablet; Take 1 tablet (300 mg) by mouth once daily in the morning. Do not crush, chew, or split.  Attention deficit hyperactivity disorder, combined type  -     methylphenidate (Ritalin) 10 mg tablet; Take 1 tablet (10 mg) by mouth 2 times a day. Do not fill before May 19, 2025.  -     methylphenidate (Ritalin) 10 mg tablet; Take 1 tablet (10 mg) by mouth 2 times a day. Do not fill before June 18, 2025.  -     methylphenidate (Ritalin) 10 mg tablet; Take 1 tablet (10 mg) by mouth 2 times a day. Do not fill before July 18, 2025.  Bipolar 2 disorder (Multi)  Obsessive thinking  PTSD (post-traumatic stress disorder)  Seasonal affective disorder  Nightmares associated with chronic post-traumatic stress disorder  Sleep difficulties          Therapy: none    Other: n/a    Patient is reminded that if there is SI to call [phone] and get themselves to the closest ED for evaluation, otherwise contact me for other questions/concerns.     Subjective   HPI:    Virtual Consent    An interactive audio and video telecommunication system which permits real time communications between the patient (in home) and provider (at home office) was utilized to provide this telehealth service.   Verbal consent was requested and obtained from Nova Kohler on this date, 04/16/2025 for a telehealth visit.     Present Illness - anxiety, depression     Characteristics/Recent psychiatric symptoms (pertinent positives and negatives) - reports her mood is affecting her pain levels with both her fibromyalgia and migraines. Reports depression is up lately with trying to manage her life and focusing her day to day living, but is at least glad her divorce is finally coming to an end, with papers only to be signed, and submitted to the courts. Reports because weather has been cooler than usual, her excessive sweating has been down, so has not needed to start using the Cogentin yet  but is noticing depression had gotten worse d/t switching back to XL from SR Wellbutrin. Reports anxiety is feeling slightly less intense but is still noticeable d/t having to manage her mother's and step-dad's financials currently, and while her son can still give her moments of grief with his poor behaviors/outbursts, he is doing well. Sleep is worse however d/t nightly intense vivid, disturbing dreams that she is admittedly not looking forward to, so it is keeping her from falling asleep for several hours, and will eventually fall asleep d/t sheer exhaustion. Will still get 7-8 hrs of sleep d/t sleeping in later or will nap turning the day, and that throws of her next nights sleep cycle (depriving her of her REM cycle) so sleep can end up being all over the place. Energy levels are low, and admits to feeling mentally/physically fatigued. Appetite is poorer than usual 'but more appropriate for me.' Still denies renate. Still describes her anxiety as feeling 'definitely overwhelmed, and then I feel like a piece of shit because I can't do anything, then it cycles and everything piles up and then it never ends.' Admits it also feels on edge, and feeling worried. Her therapist is now having had to cancel numerous appts d/t having to deal with her own cancer (chemo/radiation) that the disruptions in her own therapy, is becoming concerning and it is upsetting more for the patient as she worries for the well-being of her therapist that she has to work, in order to continue her benefits, in order to stay alive and receive treatment and is asking for a new therapist/referral. Reports racing, obsessive, ruminating thoughts are in 'full force'.      Onset/timeframe - 4 weeks  Type - anxiety, depression  Duration - daily  Aggravating and/or relieving factors/triggers - switching from XL 150mg Wellbutrin to SR 100mg stopped sweating but depression and anxiety increasing is too much for her to handle.  Treatment and treatment  changes (new meds, dosage increases or decreases, med compliance, therapy frequency, etc.) (Past and Recent) - Focalin 10mg BID, Wellbutrin SR 100mg QD, Buspar 30mg BID, Lamictal 300mg QD, Trazodone 50mg @[facility], Cymbalta 60mg QD. Atarax 25mg-75mg PRN/TID. Still looking for a new therapist.     Issues: Denies SI/HI/AVH, currently.            Review of Systems   Psychiatric/Behavioral:  Positive for dysphoric mood and sleep disturbance. The patient is nervous/anxious.        OARRS:  Blake Gupta, APRN-CNP on 4/17/2025  6:53 PM  I have personally reviewed the OARRS report for Nova Kohler. I have considered the risks of abuse, dependence, addiction and diversion    Is the patient prescribed a combination of a benzodiazepine and opioid?  No    Last Urine Drug Screen / ordered today: YES  Recent Results (from the past 8760 hours)   Methylphenidate And Metabolite,Conf,Urine    Collection Time: 06/27/24 11:25 AM   Result Value Ref Range    Ritalinic acid Urine >5000.0 ng/mL    Methylphenidate Urine Quantitative 2643.1 ng/mL   Drug Screen, Urine With Reflex to Confirmation    Collection Time: 06/27/24 11:25 AM   Result Value Ref Range    Amphetamine Screen, Urine Presumptive Negative Presumptive Negative    Barbiturate Screen, Urine Presumptive Negative Presumptive Negative    Benzodiazepines Screen, Urine Presumptive Negative Presumptive Negative    Cannabinoid Screen, Urine Presumptive Negative Presumptive Negative    Cocaine Metabolite Screen, Urine Presumptive Negative Presumptive Negative    Fentanyl Screen, Urine Presumptive Negative Presumptive Negative    Opiate Screen, Urine Presumptive Negative Presumptive Negative    Oxycodone Screen, Urine Presumptive Negative Presumptive Negative    PCP Screen, Urine Presumptive Negative Presumptive Negative    Methadone Screen, Urine Presumptive Negative Presumptive Negative       N/A    Clinical rationale for not completing a Urine Drug Screen: Reordered  today    Controlled Substance Agreement:  Date of the Last Agreement: 05/30/2024    I attest to having ordered seen the CSA and ordering it for the following substance.    Stimulants:   What is the patient's goal of therapy? Improved ADHD, depression, and energy levels.  Is this being achieved with current treatment? yes    Activities of Daily Living:   Is your overall impression that this patient is benefiting (symptom reduction outweighs side effects) from stimulant therapy? yes     1. Physical Functioning: Better  2. Family Relationship: Same  3. Social Relationship: Same  4. Mood: Better  5. Sleep Patterns: Worse  6. Overall Function: Same      Objective   Mental Status Exam:  General Appearance: Fairly groomed  Attitude/Behavior: Cooperative, Distracted, Ingratiating  Motor: Fidgeting  Speech: Rapid Speech, pressured  Gait/Station: Other:(comment) (sitting on couch at home, over virtual connection)  Mood: 'down'  Affect: Dysphoric, constricted but reactive, Anxious, Increased intensity, Congruent with mood and topic of conversation  Thought Process: Perseverative, Flight of ideas (obsessive thinking)  Thought Associations: No loosening of associations  Thought Content: Other: (comment) (divorce is almost finalized, trying to get the figure out what next)  Perception: No perceptual abnormalities noted  Sensorium: Alert and oriented to person, place, time and situation  Insight: Fair  Judgement: Fair  Cognition: Cognitively intact to conversational testing with respect to attention, orientation, fund of knowledge, recent and remote memory, and language  Testing: N/A    JESSICA-7/PHQ-9 scores reviewed: 17, 21 compared to 20, 19 reflecting small improvement in anxiety but a small increase in depressive symptoms.    Current Medications:  Current Outpatient Medications on File Prior to Visit   Medication Sig Dispense Refill    Aimovig Autoinjector 70 mg/mL injection Inject 1 mL (70 mg) under the skin every 28  (twenty-eight) days.      albuterol 90 mcg/actuation inhaler Inhale 2 puffs every 4 hours if needed for wheezing or shortness of breath.      Allergy Relief, cetirizine, 10 mg tablet Take 1 tablet (10 mg) by mouth early in the morning..      Allergy Relief, loratadine, 10 mg tablet Take 1 tablet (10 mg) by mouth if needed for allergies.      benztropine (Cogentin) 0.5 mg tablet Take 1 tablet (0.5 mg) by mouth 2 times a day. 120 tablet 0    BreatheRite MDI Spacer inhaler 1 each once daily as needed (for asthma relief PRN).      busPIRone (Buspar) 10 mg tablet Take 1 tablet (10 mg) by mouth once daily at noon. Take before meals.. 90 tablet 3    busPIRone (Buspar) 30 mg tablet Take 1 tablet (30 mg) by mouth 2 times a day. As directed 180 tablet 3    chlorhexidine gluconate (HIBICLENS TOP) 1 each once daily.      cholecalciferol (Vitamin D-3) 50 mcg (2,000 unit) capsule Take 1 capsule (2,000 Units) by mouth early in the morning..      DULoxetine (Cymbalta) 60 mg DR capsule Take 1 capsule (60 mg) by mouth once daily. 90 capsule 3    fluticasone (Flonase) 50 mcg/actuation nasal spray Administer 1 spray into each nostril once daily.      hydrOXYzine HCL (Atarax) 25 mg tablet Take 1 tablet (25 mg) by mouth 3 times a day as needed.      hyoscyamine 0.125 mg disintegrating tablet Place 1 tablet (0.125 mg) under the tongue every 4 hours if needed (as needed).      lamoTRIgine (LaMICtal) 100 mg tablet Take 3 tablets (300 mg) by mouth once daily.  As Directed Take 1 tablet in the AM and 2 tablets at bedtime. 270 tablet 3    levonorgestrel (Mirena) 21 mcg/24 hours (8 yrs) 52 mg IUD by intrauterine route. Use as directed      magnesium 200 mg tablet Take 1 tablet (200 mg) by mouth as needed at bedtime (sleep aid).      methocarbamol (Robaxin) 500 mg tablet Take 1 tablet (500 mg) by mouth 3 times a day.      methylphenidate (Ritalin) 10 mg tablet Take 1 tablet (10 mg) by mouth 2 times a day. Do not fill before March 20, 2025. 60  tablet 0    [START ON 4/19/2025] methylphenidate (Ritalin) 10 mg tablet Take 1 tablet (10 mg) by mouth 2 times a day. Do not fill before April 19, 2025. 60 tablet 0    mometasone-formoterol (Dulera) 200-5 mcg/actuation inhaler Inhale 2 puffs 2 times a day.      ondansetron (Zofran) 4 mg tablet Take 1 tablet (4 mg) by mouth every 8 hours if needed for vomiting or nausea.      Ubrelvy 100 mg tablet tablet Take 1 tablet (100 mg) by mouth if needed (migraine).      [DISCONTINUED] buPROPion XL (Wellbutrin XL) 150 mg 24 hr tablet Take 1 tablet (150 mg) by mouth once daily in the morning. Do not crush, chew, or split. 30 tablet 11    traZODone (Desyrel) 50 mg tablet Take 1-2 tablets ( mg) by mouth as needed at bedtime for sleep. 180 tablet 3    [DISCONTINUED] methylphenidate (Ritalin) 10 mg tablet Take 1 tablet (10 mg) by mouth 2 times a day. Do not fill before February 18, 2025. 60 tablet 0     No current facility-administered medications on file prior to visit.       Lab Review:   No visits with results within 2 Month(s) from this visit.   Latest known visit with results is:   Lab on 06/27/2024   Component Date Value    Amphetamine Screen, Urine 06/27/2024 Presumptive Negative     Barbiturate Screen, Urine 06/27/2024 Presumptive Negative     Benzodiazepines Screen, * 06/27/2024 Presumptive Negative     Cannabinoid Screen, Urine 06/27/2024 Presumptive Negative     Cocaine Metabolite Scree* 06/27/2024 Presumptive Negative     Fentanyl Screen, Urine 06/27/2024 Presumptive Negative     Opiate Screen, Urine 06/27/2024 Presumptive Negative     Oxycodone Screen, Urine 06/27/2024 Presumptive Negative     PCP Screen, Urine 06/27/2024 Presumptive Negative     Methadone Screen, Urine 06/27/2024 Presumptive Negative     Ritalinic acid Urine 06/27/2024 >5000.0     Methylphenidate Urine Qu* 06/27/2024 2643.1          Time Spent:    Prep time: 1 min.  Direct patient time: 31 min.  Documentation time: 6 min.  Total time: 38  min.    Next Appointment:  Follow up in 5 weeks (on 5/20/2025).

## 2025-04-17 PROBLEM — F33.2 SEVERE EPISODE OF RECURRENT MAJOR DEPRESSIVE DISORDER, WITHOUT PSYCHOTIC FEATURES (MULTI): Status: ACTIVE | Noted: 2025-04-17

## 2025-04-17 RX ORDER — METHYLPHENIDATE HYDROCHLORIDE 10 MG/1
10 TABLET ORAL 2 TIMES DAILY
Qty: 60 TABLET | Refills: 0 | Status: SHIPPED | OUTPATIENT
Start: 2025-07-18 | End: 2025-08-17

## 2025-04-17 RX ORDER — METHYLPHENIDATE HYDROCHLORIDE 10 MG/1
10 TABLET ORAL 2 TIMES DAILY
Qty: 60 TABLET | Refills: 0 | Status: SHIPPED | OUTPATIENT
Start: 2025-05-19 | End: 2025-06-18

## 2025-04-17 RX ORDER — METHYLPHENIDATE HYDROCHLORIDE 10 MG/1
10 TABLET ORAL 2 TIMES DAILY
Qty: 60 TABLET | Refills: 0 | Status: SHIPPED | OUTPATIENT
Start: 2025-06-18 | End: 2025-07-18

## 2025-04-17 ASSESSMENT — ENCOUNTER SYMPTOMS: DYSPHORIC MOOD: 1

## 2025-06-10 ENCOUNTER — APPOINTMENT (OUTPATIENT)
Dept: BEHAVIORAL HEALTH | Facility: CLINIC | Age: 40
End: 2025-06-10
Payer: COMMERCIAL

## 2025-06-10 DIAGNOSIS — F31.81 BIPOLAR 2 DISORDER (MULTI): ICD-10-CM

## 2025-06-10 DIAGNOSIS — R41.3 MEMORY DIFFICULTY: ICD-10-CM

## 2025-06-10 DIAGNOSIS — F90.2 ATTENTION DEFICIT HYPERACTIVITY DISORDER, COMBINED TYPE: Primary | ICD-10-CM

## 2025-06-10 DIAGNOSIS — G47.9 SLEEP DIFFICULTIES: ICD-10-CM

## 2025-06-10 DIAGNOSIS — F43.10 PTSD (POST-TRAUMATIC STRESS DISORDER): ICD-10-CM

## 2025-06-10 DIAGNOSIS — F42.8 OBSESSIVE THINKING: ICD-10-CM

## 2025-06-10 DIAGNOSIS — F33.2 SEVERE EPISODE OF RECURRENT MAJOR DEPRESSIVE DISORDER, WITHOUT PSYCHOTIC FEATURES (MULTI): ICD-10-CM

## 2025-06-10 DIAGNOSIS — R44.1 VISUAL HALLUCINATIONS: ICD-10-CM

## 2025-06-10 DIAGNOSIS — F41.1 GAD (GENERALIZED ANXIETY DISORDER): ICD-10-CM

## 2025-06-10 PROCEDURE — G2211 COMPLEX E/M VISIT ADD ON: HCPCS | Performed by: NURSE PRACTITIONER

## 2025-06-10 PROCEDURE — 99214 OFFICE O/P EST MOD 30 MIN: CPT | Performed by: NURSE PRACTITIONER

## 2025-06-10 RX ORDER — GALCANEZUMAB 120 MG/ML
120 INJECTION, SOLUTION SUBCUTANEOUS
COMMUNITY

## 2025-06-10 RX ORDER — TRAZODONE HYDROCHLORIDE 50 MG/1
50-100 TABLET ORAL NIGHTLY PRN
Qty: 180 TABLET | Refills: 3 | Status: SHIPPED | OUTPATIENT
Start: 2025-06-10 | End: 2026-06-10

## 2025-06-10 ASSESSMENT — PATIENT HEALTH QUESTIONNAIRE - PHQ9
2. FEELING DOWN, DEPRESSED OR HOPELESS: NEARLY EVERY DAY
8. MOVING OR SPEAKING SO SLOWLY THAT OTHER PEOPLE COULD HAVE NOTICED. OR THE OPPOSITE, BEING SO FIGETY OR RESTLESS THAT YOU HAVE BEEN MOVING AROUND A LOT MORE THAN USUAL: NEARLY EVERY DAY
6. FEELING BAD ABOUT YOURSELF - OR THAT YOU ARE A FAILURE OR HAVE LET YOURSELF OR YOUR FAMILY DOWN: NEARLY EVERY DAY
10. IF YOU CHECKED OFF ANY PROBLEMS, HOW DIFFICULT HAVE THESE PROBLEMS MADE IT FOR YOU TO DO YOUR WORK, TAKE CARE OF THINGS AT HOME, OR GET ALONG WITH OTHER PEOPLE: EXTREMELY DIFFICULT
5. POOR APPETITE OR OVEREATING: NEARLY EVERY DAY
3. TROUBLE FALLING OR STAYING ASLEEP OR SLEEPING TOO MUCH: MORE THAN HALF THE DAYS
7. TROUBLE CONCENTRATING ON THINGS, SUCH AS READING THE NEWSPAPER OR WATCHING TELEVISION: NEARLY EVERY DAY
4. FEELING TIRED OR HAVING LITTLE ENERGY: NEARLY EVERY DAY
9. THOUGHTS THAT YOU WOULD BE BETTER OFF DEAD, OR OF HURTING YOURSELF: NOT AT ALL
1. LITTLE INTEREST OR PLEASURE IN DOING THINGS: MORE THAN HALF THE DAYS

## 2025-06-10 ASSESSMENT — ENCOUNTER SYMPTOMS
NERVOUS/ANXIOUS: 1
DYSPHORIC MOOD: 1
SLEEP DISTURBANCE: 1

## 2025-06-10 ASSESSMENT — ANXIETY QUESTIONNAIRES
6. BECOMING EASILY ANNOYED OR IRRITABLE: NEARLY EVERY DAY
4. TROUBLE RELAXING: NEARLY EVERY DAY
7. FEELING AFRAID AS IF SOMETHING AWFUL MIGHT HAPPEN: NEARLY EVERY DAY
1. FEELING NERVOUS, ANXIOUS, OR ON EDGE: NEARLY EVERY DAY
5. BEING SO RESTLESS THAT IT IS HARD TO SIT STILL: NEARLY EVERY DAY
GAD7 TOTAL SCORE: 21
3. WORRYING TOO MUCH ABOUT DIFFERENT THINGS: NEARLY EVERY DAY
IF YOU CHECKED OFF ANY PROBLEMS ON THIS QUESTIONNAIRE, HOW DIFFICULT HAVE THESE PROBLEMS MADE IT FOR YOU TO DO YOUR WORK, TAKE CARE OF THINGS AT HOME, OR GET ALONG WITH OTHER PEOPLE: EXTREMELY DIFFICULT
2. NOT BEING ABLE TO STOP OR CONTROL WORRYING: NEARLY EVERY DAY

## 2025-06-10 NOTE — PROGRESS NOTES
"Adult Ambulatory Psychiatry Progress Note      Assessment/Plan     Impression:  Nova Kohler is a 39 y.o. female dom[place]iled  [place] , employed as [employer] , who presents for follow up with CC of \"I've been good, but I am noticing how scattered I have been lately and my ADHD is really. I feel really like my thoughts are going crazy and I can't get any one thought out.\"    Plan: Patient was cooperative but reserved, and anxious. Noted that having problems gathering her thoughts, as they are constantly racing and that is being an issue for her. Discussed and encouraged her to look up ADHD paralysis. Still had not started Cogentin due to no sweating as weather has remained cooler than normal. Does admit return of VH with increased dose of Wellbutrin but wants to hold of changing medications at this time as she feels it is helping with depression. Reviewed and agreed to leaving meds and treatment plan in place. Will be seeking out a therapist and next appt will be in person as she has to sign CSA and have her do urine tests for both Methylphenidate but also drug screening.     Medication: Methylphenidate (Focalin) 10mg once daily. Wellbutrin XL 300mg every day. Has available Cogentin 0.5mg daily or at bedtime for sweat management. Buspar 10mg to be taken middle of the day, late afternoon. Continues Buspar 30mg twice daily, Cymbalta 60mg every day, Atarax 25mg (up to 75mg) as needed for anxiety management, Lamictal 300mg every day, and Trazodone 50-75mg at bedtime.    Reviewed r/b/a, possible side effects of the medication. Client is aware about the benefit outweighs the risk.     Diagnoses and all orders for this visit:  Attention deficit hyperactivity disorder, combined type  Sleep difficulties  -     traZODone (Desyrel) 50 mg tablet; Take 1-2 tablets ( mg) by mouth as needed at bedtime for sleep.  Bipolar 2 disorder (Multi)  Obsessive thinking  JESSICA (generalized anxiety " disorder)  Visual hallucinations  PTSD (post-traumatic stress disorder)  Severe episode of recurrent major depressive disorder, without psychotic features (Multi)  Memory difficulty            Therapy: none    Other: n/a    Patient is reminded that if there is SI to call [phone] and get themselves to the closest ED for evaluation, otherwise contact me for other questions/concerns.     Subjective   HPI:    Virtual Consent    An interactive audio and video telecommunication system which permits real time communications between the patient (in home) and provider (at  [place] office) was utilized to provide this telehealth service.   Verbal consent was requested and obtained from Nova Kohler on this date, 06/10/2025 for a telehealth visit.     Present Illness - anxiety, depression, concentration issues     Characteristics/Recent psychiatric symptoms (pertinent positives and negatives) - reports noticing over the past month, her thoughts are becoming more scattered - 'a huge amount of racing thoughts, that I can't nail now'. Many of the thoughts are about - things to do - errands outside of the house, paying bills, to cleaning the house/bathroom to be cleaned, to her son's well-being, her mother's well-being, to her ex and his new gf's situation (no kids of her own, literally moved in and she is the 3rd or 4th gf that her son has met in just 2-3 years but doesn't like her son being exposed to so many women) and even to the smallest detail of deciding what to make for breakfast. 'By the 2nd half of the day, I am completely lost and I feel like I am in a fog.' Reports making sure to put limits with boundaries 'saying no when necessary' with others in her life, making sure to try and stay on top of tasks, but also 'not just sit on the couch and do nothing.' Feels like there is a level of disassociation but 'nothing like how it used to be. However I have noticed that lately I am oh goody, I am taking a mental pause.'  Reports her mood continues to affect her pain levels with both her fibromyalgia and migraines and after talking with her PCP, whom reported to her that as time goes on, the pain meds, such as her Robaxin becomes less effective, so referred her to pain management clinic. Reports depression remains up and her anxiety is up as well, both d/t the chronic ADHD thoughts, unable to gather her thoughts together. Admits to constantly worrying all the time, feeling overwhelmed. Admits to feeling her depression as apathetic because of being so overwhelmed by how she feels mentally and physically (pain wise), but not feeling sad or down. Admits she is struggling finding shelia in her life, so anhedonia is a struggle for her and as she has to contend with her son's behaviors - being a teenager - they are getting better and more manageable and she does love him and he is her number one priority. Both divorce and bankruptcy is done, and her ex is paying her child support as arranged through the courts. Reports because weather has been still cooler than usual, her excessive sweating has been down, so has still not needed to start using the Cogentin yet. Wants to leave the medications alone, as she feels that her scattered thoughts (as discussed) are more akin to ADHD paralysis. Needs to see a therapist as her son's therapist left the practice where he was going, so she and he are both getting new therapists through the same practice and have to fill out paperwork together to get new ones and will be doing that, this Friday. Vivid dreams are not as frequent as before (4 out of 7 nights a week - getting bullied, getting 'ripped to shreds by people I went to high school with, which is weird because back then we kept to ourselves and kept to our friends' groups and didn't talk to others so I have no idea what that is about', instead of nightly). Admits to problems with falling asleep d/t racing thoughts and staying on her phone to late,  until she passes out because 'I can't deal with my thoughts' and falls asleep hard, but then will oversleep as a result. Sleep then averages 10-11 hrs and wakes up not well rested. Energy levels are low, but at the same time is 'constantly on the go. I am not manic but I am fidgety' yet still admits to feeling mentally/physically fatigued. Appetite is now overeating - and is emotional and boredom eating 'with a few days of me not wanting to eat anything.' Still denies renate as she has not had any bouts of anger/irritability, impulsivity (excessive buying or making poor decisions). Reports some obsessive, ruminating thoughts with lots of intrusive 'what if' thoughts with 'bargaining scenarios like what would happen if I took this pencil and stabbed myself in the eye but I know I wouldn't actually do it. It's a random thought - it's not self-harm or anything.' Reports VH had resumed once going back up on Wellbutrin dose that 'while not menacing, it is annoying' - seeing bugs on her skin or thinking she was seeing someone on her front lawn when it was just a trash can but does not want to go down on the dose yet.     Onset/timeframe - 4 weeks  Type - anxiety, depression, concentration  Duration - daily  Aggravating and/or relieving factors/triggers - ADHD paralysis related issues with being unable to focus.  Treatment and treatment changes (new meds, dosage increases or decreases, med compliance, therapy frequency, etc.) (Past and Recent) - Focalin 10mg BID, Wellbutrin SR 100mg QD, Buspar 30mg BID, Lamictal 300mg QD, Trazodone 50mg @[facility], Cymbalta 60mg QD. Atarax 25mg-75mg PRN/TID. Still looking for a new therapist.     Issues: Denies SI/HI/AVH, currently.            Review of Systems   Psychiatric/Behavioral:  Positive for decreased concentration, dysphoric mood and sleep disturbance. The patient is nervous/anxious.        OARRS:  Blake Gupta, APRN-CNP on 6/11/2025  7:09 PM  I have personally reviewed the OARRS  report for Nova Kohler. I have considered the risks of abuse, dependence, addiction and diversion    Is the patient prescribed a combination of a benzodiazepine and opioid?  No    Last Urine Drug Screen / ordered today: YES  Recent Results (from the past 8760 hours)   Methylphenidate And Metabolite,Conf,Urine    Collection Time: 06/27/24 11:25 AM   Result Value Ref Range    Ritalinic acid Urine >5000.0 ng/mL    Methylphenidate Urine Quantitative 2643.1 ng/mL   Drug Screen, Urine With Reflex to Confirmation    Collection Time: 06/27/24 11:25 AM   Result Value Ref Range    Amphetamine Screen, Urine Presumptive Negative Presumptive Negative    Barbiturate Screen, Urine Presumptive Negative Presumptive Negative    Benzodiazepines Screen, Urine Presumptive Negative Presumptive Negative    Cannabinoid Screen, Urine Presumptive Negative Presumptive Negative    Cocaine Metabolite Screen, Urine Presumptive Negative Presumptive Negative    Fentanyl Screen, Urine Presumptive Negative Presumptive Negative    Opiate Screen, Urine Presumptive Negative Presumptive Negative    Oxycodone Screen, Urine Presumptive Negative Presumptive Negative    PCP Screen, Urine Presumptive Negative Presumptive Negative    Methadone Screen, Urine Presumptive Negative Presumptive Negative       N/A    Clinical rationale for not completing a Urine Drug Screen: Reordered today    Controlled Substance Agreement:  Date of the Last Agreement: 05/30/2024    I attest to having ordered seen the CSA and ordering it for the following substance.    Stimulants:   What is the patient's goal of therapy? Improved ADHD, depression, and energy levels.  Is this being achieved with current treatment? no    Activities of Daily Living:   Is your overall impression that this patient is benefiting (symptom reduction outweighs side effects) from stimulant therapy? no     1. Physical Functioning: Better  2. Family Relationship: Same  3. Social Relationship: Same  4.  Mood: Worse  5. Sleep Patterns: Worse  6. Overall Function: Same      Objective   Mental Status Exam:  General Appearance: Well groomed, appropriate eye contact  Attitude/Behavior: Cooperative  Motor: No psychomotor agitation or retardation, no tremor or other abnormal movements  Speech: Emphatic speech, normal rate and prosody  Gait/Station: Other:(comment) (sitting on couch, over virtual connection)  Mood: 'up'  Affect: Anxious, Increased intensity, Flat, Congruent with mood and topic of conversation  Thought Process: Linear, goal directed, Perseverative, Flight of ideas (obsessive)  Thought Associations: No loosening of associations  Thought Content: Other: (comment) (racing, worrying thoughts, about having trouble gathering her thoughts and struggling with trying to get things done)  Perception: No perceptual abnormalities noted  Sensorium: Alert and oriented to person, place, time and situation  Insight: Limited  Judgement: Fair  Cognition: Cognitively intact to conversational testing with respect to attention, orientation, fund of knowledge, recent and remote memory, and language  Testing: N/A    JESSICA-7/PHQ-9 scores reviewed: 21, 22 compared to 17, 21 reflecting increases in anxiety and a small increase in depressive symptoms.    Current Medications:  Current Outpatient Medications on File Prior to Visit   Medication Sig Dispense Refill    albuterol 90 mcg/actuation inhaler Inhale 2 puffs every 4 hours if needed for wheezing or shortness of breath.      Allergy Relief, cetirizine, 10 mg tablet Take 1 tablet (10 mg) by mouth early in the morning..      Allergy Relief, loratadine, 10 mg tablet Take 1 tablet (10 mg) by mouth if needed for allergies.      benztropine (Cogentin) 0.5 mg tablet Take 1 tablet (0.5 mg) by mouth 2 times a day. 120 tablet 0    BreatheRite MDI Spacer inhaler 1 each once daily as needed (for asthma relief PRN).      buPROPion XL (Wellbutrin XL) 300 mg 24 hr tablet Take 1 tablet (300 mg) by  mouth once daily in the morning. Do not crush, chew, or split. 90 tablet 3    busPIRone (Buspar) 10 mg tablet Take 1 tablet (10 mg) by mouth once daily at noon. Take before meals.. 90 tablet 3    busPIRone (Buspar) 30 mg tablet Take 1 tablet (30 mg) by mouth 2 times a day. As directed 180 tablet 3    chlorhexidine gluconate (HIBICLENS TOP) 1 each once daily.      cholecalciferol (Vitamin D-3) 50 mcg (2,000 unit) capsule Take 1 capsule (2,000 Units) by mouth early in the morning..      DULoxetine (Cymbalta) 60 mg DR capsule Take 1 capsule (60 mg) by mouth once daily. 90 capsule 3    Emgality Pen 120 mg/mL auto-injector Inject 1 Syringe (120 mg) under the skin every 30 (thirty) days.      fluticasone (Flonase) 50 mcg/actuation nasal spray Administer 1 spray into each nostril once daily.      hydrOXYzine HCL (Atarax) 25 mg tablet Take 1 tablet (25 mg) by mouth 3 times a day as needed.      hyoscyamine 0.125 mg disintegrating tablet Place 1 tablet (0.125 mg) under the tongue every 4 hours if needed (as needed).      lamoTRIgine (LaMICtal) 100 mg tablet Take 3 tablets (300 mg) by mouth once daily.  As Directed Take 1 tablet in the AM and 2 tablets at bedtime. 270 tablet 3    levonorgestrel (Mirena) 21 mcg/24 hours (8 yrs) 52 mg IUD by intrauterine route. Use as directed      magnesium 200 mg tablet Take 1 tablet (200 mg) by mouth as needed at bedtime (sleep aid).      methocarbamol (Robaxin) 500 mg tablet Take 1 tablet (500 mg) by mouth 3 times a day.      methylphenidate (Ritalin) 10 mg tablet Take 1 tablet (10 mg) by mouth 2 times a day. Do not fill before May 19, 2025. 60 tablet 0    [START ON 6/18/2025] methylphenidate (Ritalin) 10 mg tablet Take 1 tablet (10 mg) by mouth 2 times a day. Do not fill before June 18, 2025. 60 tablet 0    [START ON 7/18/2025] methylphenidate (Ritalin) 10 mg tablet Take 1 tablet (10 mg) by mouth 2 times a day. Do not fill before July 18, 2025. 60 tablet 0    mometasone-formoterol (Dulera)  200-5 mcg/actuation inhaler Inhale 2 puffs 2 times a day.      ondansetron (Zofran) 4 mg tablet Take 1 tablet (4 mg) by mouth every 8 hours if needed for vomiting or nausea.      Ubrelvy 100 mg tablet tablet Take 1 tablet (100 mg) by mouth if needed (migraine).      [DISCONTINUED] traZODone (Desyrel) 50 mg tablet Take 1-2 tablets ( mg) by mouth as needed at bedtime for sleep. 180 tablet 3    [DISCONTINUED] Aimovig Autoinjector 70 mg/mL injection Inject 1 mL (70 mg) under the skin every 28 (twenty-eight) days. (Patient not taking: Reported on 6/10/2025)      [DISCONTINUED] methylphenidate (Ritalin) 10 mg tablet Take 1 tablet (10 mg) by mouth 2 times a day. Do not fill before March 20, 2025. 60 tablet 0    [DISCONTINUED] methylphenidate (Ritalin) 10 mg tablet Take 1 tablet (10 mg) by mouth 2 times a day. Do not fill before April 19, 2025. 60 tablet 0     No current facility-administered medications on file prior to visit.       Lab Review:   No visits with results within 2 Month(s) from this visit.   Latest known visit with results is:   Lab on 06/27/2024   Component Date Value    Amphetamine Screen, Urine 06/27/2024 Presumptive Negative     Barbiturate Screen, Urine 06/27/2024 Presumptive Negative     Benzodiazepines Screen, * 06/27/2024 Presumptive Negative     Cannabinoid Screen, Urine 06/27/2024 Presumptive Negative     Cocaine Metabolite Scree* 06/27/2024 Presumptive Negative     Fentanyl Screen, Urine 06/27/2024 Presumptive Negative     Opiate Screen, Urine 06/27/2024 Presumptive Negative     Oxycodone Screen, Urine 06/27/2024 Presumptive Negative     PCP Screen, Urine 06/27/2024 Presumptive Negative     Methadone Screen, Urine 06/27/2024 Presumptive Negative     Ritalinic acid Urine 06/27/2024 >5000.0     Methylphenidate Urine Qu* 06/27/2024 2643.1          Time Spent:    Prep time: 1 min.  Direct patient time: 32 min.  Documentation time: 6 min.  Total time: 39 min.    Next Appointment:  Follow up in 5  weeks (on 7/18/2025).

## 2025-06-11 ASSESSMENT — ENCOUNTER SYMPTOMS: DECREASED CONCENTRATION: 1

## 2025-07-18 ENCOUNTER — APPOINTMENT (OUTPATIENT)
Dept: BEHAVIORAL HEALTH | Facility: CLINIC | Age: 40
End: 2025-07-18
Payer: COMMERCIAL

## 2025-07-25 ENCOUNTER — APPOINTMENT (OUTPATIENT)
Dept: BEHAVIORAL HEALTH | Facility: CLINIC | Age: 40
End: 2025-07-25
Payer: MEDICARE

## 2025-07-28 DIAGNOSIS — F31.81 BIPOLAR 2 DISORDER (MULTI): Primary | ICD-10-CM

## 2025-07-28 RX ORDER — LAMOTRIGINE 100 MG/1
300 TABLET ORAL DAILY
Qty: 270 TABLET | Refills: 3 | Status: SHIPPED | OUTPATIENT
Start: 2025-07-28 | End: 2026-07-28

## 2025-07-30 DIAGNOSIS — F41.1 GAD (GENERALIZED ANXIETY DISORDER): Primary | ICD-10-CM

## 2025-07-30 RX ORDER — BUSPIRONE HYDROCHLORIDE 10 MG/1
10 TABLET ORAL
Qty: 90 TABLET | Refills: 3 | Status: SHIPPED | OUTPATIENT
Start: 2025-07-30 | End: 2026-07-30

## 2025-08-15 ENCOUNTER — APPOINTMENT (OUTPATIENT)
Dept: BEHAVIORAL HEALTH | Facility: CLINIC | Age: 40
End: 2025-08-15
Payer: MEDICARE

## 2025-08-15 VITALS
BODY MASS INDEX: 44.24 KG/M2 | SYSTOLIC BLOOD PRESSURE: 124 MMHG | DIASTOLIC BLOOD PRESSURE: 70 MMHG | WEIGHT: 265.5 LBS | TEMPERATURE: 98.5 F | HEART RATE: 85 BPM | HEIGHT: 65 IN

## 2025-08-15 DIAGNOSIS — R44.1 VISUAL HALLUCINATIONS: ICD-10-CM

## 2025-08-15 DIAGNOSIS — F42.8 OBSESSIVE THINKING: ICD-10-CM

## 2025-08-15 DIAGNOSIS — F31.81 BIPOLAR 2 DISORDER (MULTI): ICD-10-CM

## 2025-08-15 DIAGNOSIS — F43.10 PTSD (POST-TRAUMATIC STRESS DISORDER): ICD-10-CM

## 2025-08-15 DIAGNOSIS — F41.1 GAD (GENERALIZED ANXIETY DISORDER): Primary | ICD-10-CM

## 2025-08-15 DIAGNOSIS — F90.2 ATTENTION DEFICIT HYPERACTIVITY DISORDER, COMBINED TYPE: ICD-10-CM

## 2025-08-15 DIAGNOSIS — G47.9 SLEEP DIFFICULTIES: ICD-10-CM

## 2025-08-15 DIAGNOSIS — F33.2 SEVERE EPISODE OF RECURRENT MAJOR DEPRESSIVE DISORDER, WITHOUT PSYCHOTIC FEATURES (MULTI): ICD-10-CM

## 2025-08-15 PROCEDURE — 99214 OFFICE O/P EST MOD 30 MIN: CPT | Performed by: NURSE PRACTITIONER

## 2025-08-15 PROCEDURE — 3008F BODY MASS INDEX DOCD: CPT | Performed by: NURSE PRACTITIONER

## 2025-08-15 RX ORDER — BUDESONIDE AND FORMOTEROL FUMARATE DIHYDRATE 160; 4.5 UG/1; UG/1
2 AEROSOL RESPIRATORY (INHALATION)
COMMUNITY
Start: 2025-07-07 | End: 2025-08-17 | Stop reason: SINTOL

## 2025-08-15 RX ORDER — METHYLPHENIDATE HYDROCHLORIDE 10 MG/1
10 TABLET ORAL 2 TIMES DAILY
Qty: 60 TABLET | Refills: 0 | Status: SHIPPED | OUTPATIENT
Start: 2025-08-17 | End: 2025-09-16

## 2025-08-15 RX ORDER — METHYLPHENIDATE HYDROCHLORIDE 10 MG/1
10 TABLET ORAL 2 TIMES DAILY
Qty: 60 TABLET | Refills: 0 | Status: SHIPPED | OUTPATIENT
Start: 2025-10-16 | End: 2025-11-15

## 2025-08-15 RX ORDER — METHYLPHENIDATE HYDROCHLORIDE 10 MG/1
10 TABLET ORAL 2 TIMES DAILY
Qty: 60 TABLET | Refills: 0 | Status: SHIPPED | OUTPATIENT
Start: 2025-09-16 | End: 2025-10-16

## 2025-08-15 RX ORDER — NAPROXEN 500 MG/1
500 TABLET ORAL
COMMUNITY
Start: 2025-08-05

## 2025-08-15 ASSESSMENT — PATIENT HEALTH QUESTIONNAIRE - PHQ9
2. FEELING DOWN, DEPRESSED OR HOPELESS: MORE THAN HALF THE DAYS
7. TROUBLE CONCENTRATING ON THINGS, SUCH AS READING THE NEWSPAPER OR WATCHING TELEVISION: NEARLY EVERY DAY
6. FEELING BAD ABOUT YOURSELF - OR THAT YOU ARE A FAILURE OR HAVE LET YOURSELF OR YOUR FAMILY DOWN: MORE THAN HALF THE DAYS
9. THOUGHTS THAT YOU WOULD BE BETTER OFF DEAD, OR OF HURTING YOURSELF: NOT AT ALL
1. LITTLE INTEREST OR PLEASURE IN DOING THINGS: NEARLY EVERY DAY
5. POOR APPETITE OR OVEREATING: NEARLY EVERY DAY
4. FEELING TIRED OR HAVING LITTLE ENERGY: NEARLY EVERY DAY
8. MOVING OR SPEAKING SO SLOWLY THAT OTHER PEOPLE COULD HAVE NOTICED. OR THE OPPOSITE, BEING SO FIGETY OR RESTLESS THAT YOU HAVE BEEN MOVING AROUND A LOT MORE THAN USUAL: NEARLY EVERY DAY
10. IF YOU CHECKED OFF ANY PROBLEMS, HOW DIFFICULT HAVE THESE PROBLEMS MADE IT FOR YOU TO DO YOUR WORK, TAKE CARE OF THINGS AT HOME, OR GET ALONG WITH OTHER PEOPLE: EXTREMELY DIFFICULT
3. TROUBLE FALLING OR STAYING ASLEEP OR SLEEPING TOO MUCH: MORE THAN HALF THE DAYS

## 2025-08-15 ASSESSMENT — ANXIETY QUESTIONNAIRES
3. WORRYING TOO MUCH ABOUT DIFFERENT THINGS: NEARLY EVERY DAY
GAD7 TOTAL SCORE: 19
7. FEELING AFRAID AS IF SOMETHING AWFUL MIGHT HAPPEN: MORE THAN HALF THE DAYS
1. FEELING NERVOUS, ANXIOUS, OR ON EDGE: NEARLY EVERY DAY
6. BECOMING EASILY ANNOYED OR IRRITABLE: MORE THAN HALF THE DAYS
4. TROUBLE RELAXING: NEARLY EVERY DAY
5. BEING SO RESTLESS THAT IT IS HARD TO SIT STILL: NEARLY EVERY DAY
2. NOT BEING ABLE TO STOP OR CONTROL WORRYING: NEARLY EVERY DAY
IF YOU CHECKED OFF ANY PROBLEMS ON THIS QUESTIONNAIRE, HOW DIFFICULT HAVE THESE PROBLEMS MADE IT FOR YOU TO DO YOUR WORK, TAKE CARE OF THINGS AT HOME, OR GET ALONG WITH OTHER PEOPLE: EXTREMELY DIFFICULT

## 2025-08-15 ASSESSMENT — COLUMBIA-SUICIDE SEVERITY RATING SCALE - C-SSRS
TOTAL  NUMBER OF INTERRUPTED ATTEMPTS LIFETIME: NO
TOTAL  NUMBER OF ABORTED OR SELF INTERRUPTED ATTEMPTS LIFETIME: NO
6. HAVE YOU EVER DONE ANYTHING, STARTED TO DO ANYTHING, OR PREPARED TO DO ANYTHING TO END YOUR LIFE?: NO
ATTEMPT LIFETIME: NO
2. HAVE YOU ACTUALLY HAD ANY THOUGHTS OF KILLING YOURSELF?: NO
1. HAVE YOU WISHED YOU WERE DEAD OR WISHED YOU COULD GO TO SLEEP AND NOT WAKE UP?: NO

## 2025-08-15 ASSESSMENT — ENCOUNTER SYMPTOMS
NERVOUS/ANXIOUS: 1
DYSPHORIC MOOD: 1
DECREASED CONCENTRATION: 1
SLEEP DISTURBANCE: 1

## 2025-08-19 ENCOUNTER — TELEMEDICINE (OUTPATIENT)
Dept: BEHAVIORAL HEALTH | Facility: CLINIC | Age: 40
End: 2025-08-19
Payer: MEDICARE

## 2025-08-19 DIAGNOSIS — F90.2 ATTENTION DEFICIT HYPERACTIVITY DISORDER, COMBINED TYPE: ICD-10-CM

## 2025-08-19 DIAGNOSIS — R41.3 MEMORY DIFFICULTY: Primary | ICD-10-CM

## 2025-08-19 PROCEDURE — 1036F TOBACCO NON-USER: CPT | Performed by: NURSE PRACTITIONER

## 2025-08-19 PROCEDURE — 99215 OFFICE O/P EST HI 40 MIN: CPT | Performed by: NURSE PRACTITIONER

## 2025-08-19 ASSESSMENT — ENCOUNTER SYMPTOMS
DYSPHORIC MOOD: 1
NERVOUS/ANXIOUS: 1
DECREASED CONCENTRATION: 1
SLEEP DISTURBANCE: 1

## 2025-08-21 LAB
DRUG SCREEN COMMENT UR-IMP: ABNORMAL
QUEST NOTES AND COMMENTS: ABNORMAL
QUEST RITALINIC ACID URINE: 6004 NG/ML

## 2025-10-15 ENCOUNTER — APPOINTMENT (OUTPATIENT)
Dept: BEHAVIORAL HEALTH | Facility: CLINIC | Age: 40
End: 2025-10-15
Payer: COMMERCIAL